# Patient Record
Sex: FEMALE | Race: WHITE | Employment: OTHER | URBAN - METROPOLITAN AREA
[De-identification: names, ages, dates, MRNs, and addresses within clinical notes are randomized per-mention and may not be internally consistent; named-entity substitution may affect disease eponyms.]

---

## 2017-04-18 ENCOUNTER — HOSPITAL ENCOUNTER (OUTPATIENT)
Dept: LAB | Age: 72
Discharge: HOME | End: 2017-04-18
Payer: MEDICARE

## 2017-04-18 ENCOUNTER — OFFICE VISIT (OUTPATIENT)
Dept: INTERNAL MEDICINE CLINIC | Age: 72
End: 2017-04-18

## 2017-04-18 VITALS
OXYGEN SATURATION: 98 % | RESPIRATION RATE: 16 BRPM | HEART RATE: 70 BPM | BODY MASS INDEX: 48.18 KG/M2 | WEIGHT: 245.4 LBS | HEIGHT: 60 IN | DIASTOLIC BLOOD PRESSURE: 76 MMHG | SYSTOLIC BLOOD PRESSURE: 126 MMHG | TEMPERATURE: 97.9 F

## 2017-04-18 DIAGNOSIS — Z12.31 ENCOUNTER FOR SCREENING MAMMOGRAM FOR BREAST CANCER: ICD-10-CM

## 2017-04-18 DIAGNOSIS — M79.7 FIBROMYALGIA: ICD-10-CM

## 2017-04-18 DIAGNOSIS — I10 ESSENTIAL HYPERTENSION: Primary | ICD-10-CM

## 2017-04-18 DIAGNOSIS — E55.9 VITAMIN D DEFICIENCY: ICD-10-CM

## 2017-04-18 PROBLEM — H57.02 ANISOCORIA: Status: ACTIVE | Noted: 2017-04-18

## 2017-04-18 PROCEDURE — 36415 COLL VENOUS BLD VENIPUNCTURE: CPT

## 2017-04-18 PROCEDURE — 80053 COMPREHEN METABOLIC PANEL: CPT

## 2017-04-18 PROCEDURE — 82306 VITAMIN D 25 HYDROXY: CPT

## 2017-04-18 RX ORDER — FUROSEMIDE 20 MG/1
TABLET ORAL DAILY
COMMUNITY
End: 2017-05-11 | Stop reason: SDUPTHER

## 2017-04-18 RX ORDER — IBUPROFEN 200 MG
200 TABLET ORAL AS NEEDED
COMMUNITY
End: 2018-05-17 | Stop reason: ALTCHOICE

## 2017-04-18 RX ORDER — CHOLECALCIFEROL (VITAMIN D3) 125 MCG
CAPSULE ORAL
COMMUNITY
End: 2018-05-17 | Stop reason: ALTCHOICE

## 2017-04-18 RX ORDER — ACETAMINOPHEN 325 MG/1
TABLET ORAL
COMMUNITY
End: 2021-05-25

## 2017-04-18 RX ORDER — TRAMADOL HYDROCHLORIDE 50 MG/1
50 TABLET ORAL
COMMUNITY
End: 2017-12-19 | Stop reason: SDUPTHER

## 2017-04-18 RX ORDER — LISINOPRIL 10 MG/1
TABLET ORAL DAILY
COMMUNITY
End: 2017-05-11

## 2017-04-18 RX ORDER — TRAMADOL HYDROCHLORIDE 50 MG/1
50 TABLET ORAL
Status: CANCELLED | OUTPATIENT
Start: 2017-04-18

## 2017-04-18 NOTE — PROGRESS NOTES
Patient received paperwork for advance directive during previous visit but has not completed at this time     Reviewed record In preparation for visit and have obtained necessary documentation      1. Have you been to the ER, urgent care clinic since your last visit? Hospitalized since your last visit? [facility] 1.5 mo ago r/t ear infection//amoxicillin     2. Have you seen or consulted any other health care providers outside of the [address] [place] [address] since your last visit? Include any pap smears or colon screening.  NO

## 2017-04-18 NOTE — PROGRESS NOTES
HPI  Ms. Keshia Salguero is a 73y.o. year old female, she is seen today to establish care. PMH significant for fibromyalgia, HTN. Takes tramadol for fibromyalgia pain, worse in summer. Has pain in multiple areas, sensitive to light touch. Says in summer will take lasix 40mg daily in summer with fluid retention, causes more pressure on bones and muscles and causes more pain. Has been working longer hours as a , worked 11 hours yesterday. Tried lyrica (felt dopey and forgetful), elavil (caused facial swelling) in past for fibromyalgia. Ruthann Hals will last one year with 15 pills. Says was seen for Dr. Sandra Rehman for low HR, has r/o YOLANDA. Stopped calan and HR improved, now normally in 60s. No cp or sob. No LOC or dizziness. Pain is worse when she has been out of work or sedentary. Has been seen by dermatologist and had multiple areas removed - has had skin CA - no melanoma.  x 2 since    [de-identified] has had high K in past, stopped eating so many high k foods and normalized. Chief Complaint   Patient presents with   [address]     Room // [facility] Dr Isma Bruce- prior PCP        Prior to Admission medications    Medication Sig Start Date End Date Taking? Authorizing Provider   lisinopril (PRINIVIL, ZESTRIL) 10 mg tablet Take  by mouth daily. Yes Historical Provider   furosemide (LASIX) 20 mg tablet Take  by mouth daily. Yes Historical Provider   traMADol (ULTRAM) 50 mg tablet Take 50 mg by mouth every six (6) hours as needed for Pain. Yes Historical Provider   NAPROXEN SODIUM (ALEVE PO) Take  by mouth as needed. Yes Historical Provider   ibuprofen (ADVIL) 200 mg tablet Take 200 mg by mouth as needed for Pain. Yes Historical Provider   cholecalciferol, vitamin D3, (VITAMIN D3) 2,000 unit tab Take  by mouth. Yes Historical Provider   acetaminophen (TYLENOL) 325 mg tablet Take  by mouth every four (4) hours as needed for Pain.    Yes Historical Provider   Cetirizine (ZYRTEC) 10 mg cap Take  by mouth. Yes Historical Provider         No Active Allergies      REVIEW OF SYSTEMS:  Per HPI    PHYSICAL EXAM:  Visit Vitals    /71 (BP 1 Location: Left arm, BP Patient Position: Sitting)    Pulse 70    Temp 97.9 °F (36.6 °C) (Oral)    Resp 16    Ht 5' (1.524 m)    Wt 245 lb 6.4 oz (111.3 kg)    SpO2 98%    BMI 47.93 kg/m2     Constitutional: Appears well-developed and well-nourished. No distress. HENT:   Head: Normocephalic and atraumatic. Eyes: No scleral icterus. Neck: no lad, no tm, supple   Cardiovascular: Normal S1/S2, regular rhythm. No murmurs, rubs, or gallops. Pulmonary/Chest: Effort normal and breath sounds normal. No respiratory distress. No wheezes, rhonchi, or rales. Abdomen: Soft, NT/ND, +BS, no rebound or guarding, no masses, no HSM appreciated. Ext: No edema. Neurological: Alert. Psychiatric: Normal mood and affect. Behavior is normal.     Lab Results   Component Value Date/Time    Sodium 141 05/16/2010 09:05 AM    Potassium 3.3 05/16/2010 09:05 AM    Chloride 104 05/16/2010 09:05 AM    CO2 30 05/16/2010 09:05 AM    Anion gap 7 05/16/2010 09:05 AM    Glucose 118 05/16/2010 09:05 AM    BUN 21 05/16/2010 09:05 AM    Creatinine 0.8 05/16/2010 09:05 AM    BUN/Creatinine ratio 26 05/16/2010 09:05 AM    GFR est AA >60 05/16/2010 09:05 AM    GFR est non-AA >60 05/16/2010 09:05 AM    Calcium 9.2 05/16/2010 09:05 AM    Bilirubin, total 0.3 05/16/2010 09:05 AM    AST (SGOT) 18 05/16/2010 09:05 AM    Alk.  phosphatase 147 05/16/2010 09:05 AM    Protein, total 7.5 05/16/2010 09:05 AM    Albumin 3.8 05/16/2010 09:05 AM    Globulin 3.7 05/16/2010 09:05 AM    A-G Ratio 1.0 05/16/2010 09:05 AM    ALT (SGPT) 40 05/16/2010 09:05 AM     No results found for: HBA1C, HGBE8, HNF3FHTE, TML6TYCA   No results found for: CHOL, CHOLPOCT, CHOLX, CHLST, CHOLV, HDL, HDLPOC, LDL, LDLCPOC, NLDLCT, DLDL, LDLC, DLDLP, VLDLC, VLDL, TGL, TGLX, TRIGL, [id number], [place] Jama, [address], [address]  [facility]       ASSESSMENT/PLAN  Bailey Dukes was seen today for establish care. Diagnoses and all orders for this visit:    Essential hypertension  -     METABOLIC PANEL, COMPREHENSIVE  ,Controlled on current regimen, continue   Fibromyalgia  Continue to stay active, stretch - rare use tramadol - doesn't need refill  Vitamin D deficiency  -     VITAMIN D, 25 HYDROXY    Encounter for screening mammogram for breast cancer  -     [facility] MAMMO BI SCREENING INCL CAD; Future    Will request records from previous provider        Health Maintenance Due   Topic Date Due    Hepatitis C Screening  1945    FOBT Q 1 YEAR AGE 50-75  10/01/1997    GLAUCOMA SCREENING Q2Y  10/01/2012    OSTEOPOROSIS SCREENING (DEXA)  10/01/2012    MEDICARE YEARLY EXAM  10/01/2012    BREAST CANCER SCRN MAMMOGRAM  01/08/2016        Follow-up Disposition:  Return in about 4 months (around 8/18/2017) for bp, fibromyalgia. Reviewed plan of care. Patient has provided input and agrees with goals. The nurse provided the patient and/or family with advanced directive information if needed and encouraged the patient to provide a copy to the office when available.

## 2017-04-18 NOTE — MR AVS SNAPSHOT
Visit Information Date & Time Provider Department Dept. Phone Encounter #  
 4/18/2017  3:15 PM Raúl Wood MD [facility] [phone] [id number] Follow-up Instructions Return in about 4 months (around 8/18/2017) for bp, fibromyalgia. Upcoming Health Maintenance Date Due Hepatitis C Screening 1945 FOBT Q 1 YEAR AGE 50-75 10/1/1997 GLAUCOMA SCREENING Q2Y 10/1/2012 OSTEOPOROSIS SCREENING (DEXA) 10/1/2012 MEDICARE YEARLY EXAM 10/1/2012 BREAST CANCER SCRN MAMMOGRAM 1/8/2016 Pneumococcal 65+ Low/Medium Risk (2 of 2 - PPSV23) 4/18/2018 DTaP/Tdap/Td series (2 - Td) 4/18/2027 Allergies as of 4/18/2017  Review Complete On: 4/18/2017 By: Raúl Wood MD  
 No Active Allergies Current Immunizations  Never Reviewed No immunizations on file. Not reviewed this visit You Were Diagnosed With   
  
 Codes Comments Essential hypertension    -  Primary ICD-10-CM: I10 
ICD-9-CM: 401.9 Fibromyalgia     ICD-10-CM: M79.7 ICD-9-CM: 729.1 Vitamin D deficiency     ICD-10-CM: E55.9 ICD-9-CM: 268.9 Encounter for screening mammogram for breast cancer     ICD-10-CM: Z12.31 
ICD-9-CM: V76.12 Vitals BP Pulse Temp Resp Height(growth percentile) Weight(growth percentile) 126/76 70 97.9 °F (36.6 °C) (Oral) 16 5' (1.524 m) 245 lb 6.4 oz (111.3 kg) SpO2 BMI Smoking Status 98% 47.93 kg/m2 Never Smoker Vitals History BMI and BSA Data Body Mass Index Body Surface Area  
 47.93 kg/m 2 2.17 m 2 Preferred Pharmacy Pharmacy Name Phone [facility] [address] [place], [place] - [address] [place] [address] [phone] Your Updated Medication List  
  
   
This list is accurate as of: 4/18/17  4:14 PM.  Always use your most recent med list. ADVIL 200 mg tablet Generic drug:  ibuprofen Take 200 mg by mouth as needed for Pain.   
  
 ALEVE PO  
 Take  by mouth as needed. LASIX 20 mg tablet Generic drug:  furosemide Take  by mouth daily. lisinopril 10 mg tablet Commonly known as:  Roxianne Daughters Take  by mouth daily. traMADol 50 mg tablet Commonly known as:  ULTRAM  
Take 50 mg by mouth every six (6) hours as needed for Pain. TYLENOL 325 mg tablet Generic drug:  acetaminophen Take  by mouth every four (4) hours as needed for Pain. VITAMIN D3 2,000 unit Tab Generic drug:  cholecalciferol (vitamin D3) Take  by mouth. ZyrTEC 10 mg Cap Generic drug:  Cetirizine Take  by mouth. We Performed the Following METABOLIC PANEL, COMPREHENSIVE [82707 CPT(R)] VITAMIN D, 25 HYDROXY [id number] CPT(R)] Follow-up Instructions Return in about 4 months (around 8/18/2017) for bp, fibromyalgia. To-Do List   
 04/18/2017 Imaging:  [facility] MAMMO BI SCREENING INCL CAD Patient Instructions Fibromyalgia: Care Instructions Your Care Instructions Fibromyalgia is a painful condition that is not completely understood by medical experts. The cause of fibromyalgia is not known. It can make you feel tired and ache all over. It causes tender spots at specific points of the body that hurt only when you press on them. You may have trouble sleeping, as well as other symptoms. These problems can upset your work and home life. Symptoms tend to come and go, although they may never go away completely. Fibromyalgia does not harm your muscles, joints, or organs. Follow-up care is a key part of your treatment and safety. Be sure to make and go to all appointments, and call your doctor if you are having problems. It's also a good idea to know your test results and keep a list of the medicines you take. How can you care for yourself at home? · Exercise often. Walk, swim, or bike to help with pain and sleep problems and to make you feel better. · Try to get a good night's sleep. Go to bed and get up at the same time each day, whether you feel rested or not. Make sure you have a good mattress and pillow. · Reduce stress. Avoid things that cause you stress, if you can. If not, work at making them less stressful. Learn to use biofeedback, guided imagery, meditation, or other methods to relax. · Make healthy changes. Eat a balanced diet, quit smoking, and limit alcohol and caffeine. · Use a heating pad set on low or take warm baths or showers for pain. Using cold packs for up to 20 minutes at a time can also relieve pain. Put a thin cloth between the cold pack and your skin. A gentle massage might help too. · Be safe with medicines. Take your medicines exactly as prescribed. Call your doctor if you think you are having a problem with your medicine. Your doctor may talk to you about taking antidepressant medicines. These medicines may improve sleep, relieve pain, and in some cases treat depression. · Learn about fibromyalgia. This makes coping easier. Then, take an active role in your treatment. · Think about joining a support group with others who have fibromyalgia to learn more and get support. When should you call for help? Watch closely for changes in your health, and be sure to contact your doctor if: 
· You feel sad, helpless, or hopeless; lose interest in things you used to enjoy; or have other symptoms of depression. · Your fibromyalgia symptoms get worse. Where can you learn more? Go to http://sarah-ingris.info/. Enter V003 in the search box to learn more about \"Fibromyalgia: Care Instructions. \" Current as of: October 14, 2016 Content Version: 11.2 © 1174-0891 Web and Rank. Care instructions adapted under license by [employer] (which disclaims liability or warranty for this information).  If you have questions about a medical condition or this instruction, always ask your healthcare professional. [address] any warranty or liability for your use of this information. Introducing [facility]! Dear Jonathan Rojas: 
Thank you for requesting a [employer] account. Our records indicate that you have previously registered for a [employer] account but its currently inactive. Please call our [employer] support line at [phone]. Additional Information If you have questions, please visit the Frequently Asked Questions section of the [employer] website at [email or web address] Remember, [employer] is NOT to be used for urgent needs. For medical emergencies, dial 911. Now available from your iPhone and Android! Please provide this summary of care documentation to your next provider. Your primary care clinician is listed as Star Roche. If you have any questions after today's visit, please call [phone].

## 2017-04-18 NOTE — PATIENT INSTRUCTIONS
Fibromyalgia: Care Instructions  Your Care Instructions  Fibromyalgia is a painful condition that is not completely understood by medical experts. The cause of fibromyalgia is not known. It can make you feel tired and ache all over. It causes tender spots at specific points of the body that hurt only when you press on them. You may have trouble sleeping, as well as other symptoms. These problems can upset your work and home life. Symptoms tend to come and go, although they may never go away completely. Fibromyalgia does not harm your muscles, joints, or organs. Follow-up care is a key part of your treatment and safety. Be sure to make and go to all appointments, and call your doctor if you are having problems. It's also a good idea to know your test results and keep a list of the medicines you take. How can you care for yourself at home? · Exercise often. Walk, swim, or bike to help with pain and sleep problems and to make you feel better. · Try to get a good night's sleep. Go to bed and get up at the same time each day, whether you feel rested or not. Make sure you have a good mattress and pillow. · Reduce stress. Avoid things that cause you stress, if you can. If not, work at making them less stressful. Learn to use biofeedback, guided imagery, meditation, or other methods to relax. · Make healthy changes. Eat a balanced diet, quit smoking, and limit alcohol and caffeine. · Use a heating pad set on low or take warm baths or showers for pain. Using cold packs for up to 20 minutes at a time can also relieve pain. Put a thin cloth between the cold pack and your skin. A gentle massage might help too. · Be safe with medicines. Take your medicines exactly as prescribed. Call your doctor if you think you are having a problem with your medicine. Your doctor may talk to you about taking antidepressant medicines. These medicines may improve sleep, relieve pain, and in some cases treat depression.   · Learn about fibromyalgia. This makes coping easier. Then, take an active role in your treatment. · Think about joining a support group with others who have fibromyalgia to learn more and get support. When should you call for help? Watch closely for changes in your health, and be sure to contact your doctor if:  · You feel sad, helpless, or hopeless; lose interest in things you used to enjoy; or have other symptoms of depression. · Your fibromyalgia symptoms get worse. Where can you learn more? Go to http://sarah-ingris.info/. Enter V003 in the search box to learn more about \"Fibromyalgia: Care Instructions. \"  Current as of: October 14, 2016  Content Version: 11.2  © 3485-6763 [employer]. Care instructions adapted under license by [employer] (which disclaims liability or warranty for this information). If you have questions about a medical condition or this instruction, always ask your healthcare professional. [address] any warranty or liability for your use of this information.

## 2017-04-19 LAB
25(OH)D3+25(OH)D2 SERPL-MCNC: 35.2 NG/ML (ref 30–100)
ALBUMIN SERPL-MCNC: 4.5 G/DL (ref 3.6–4.8)
ALBUMIN/GLOB SERPL: 2 {RATIO} (ref 1.2–2.2)
ALP SERPL-CCNC: 98 IU/L (ref 39–117)
ALT SERPL-CCNC: 15 IU/L (ref 0–32)
AST SERPL-CCNC: 18 IU/L (ref 0–40)
BILIRUB SERPL-MCNC: 0.2 MG/DL (ref 0–1.2)
BUN SERPL-MCNC: 37 MG/DL (ref 8–27)
BUN/CREAT SERPL: 30 (ref 12–28)
CALCIUM SERPL-MCNC: 9.5 MG/DL (ref 8.7–10.3)
CHLORIDE SERPL-SCNC: 100 MMOL/L (ref 96–106)
CO2 SERPL-SCNC: 22 MMOL/L (ref 18–29)
CREAT SERPL-MCNC: 1.24 MG/DL (ref 0.57–1)
GLOBULIN SER CALC-MCNC: 2.2 G/DL (ref 1.5–4.5)
GLUCOSE SERPL-MCNC: 95 MG/DL (ref 65–99)
INTERPRETATION: NORMAL
POTASSIUM SERPL-SCNC: 5.3 MMOL/L (ref 3.5–5.2)
PROT SERPL-MCNC: 6.7 G/DL (ref 6–8.5)
SODIUM SERPL-SCNC: 138 MMOL/L (ref 134–144)

## 2017-04-21 RX ORDER — FUROSEMIDE 20 MG/1
TABLET ORAL DAILY
Status: CANCELLED | OUTPATIENT
Start: 2017-04-21

## 2017-04-25 RX ORDER — FUROSEMIDE 40 MG/1
20 TABLET ORAL
Qty: 30 TAB | Refills: 0 | Status: SHIPPED | OUTPATIENT
Start: 2017-04-25 | End: 2017-08-28 | Stop reason: SDUPTHER

## 2017-04-25 NOTE — PROGRESS NOTES
Tell her vit d okay. Appears kidneys are strained - possibly from lisinopril. I want her to stop it and follow up in 1-2 weeks for repeat bmp and bp check.

## 2017-05-11 ENCOUNTER — OFFICE VISIT (OUTPATIENT)
Dept: INTERNAL MEDICINE CLINIC | Age: 72
End: 2017-05-11

## 2017-05-11 VITALS
HEIGHT: 60 IN | RESPIRATION RATE: 16 BRPM | HEART RATE: 55 BPM | DIASTOLIC BLOOD PRESSURE: 82 MMHG | BODY MASS INDEX: 47.57 KG/M2 | WEIGHT: 242.3 LBS | TEMPERATURE: 98.2 F | SYSTOLIC BLOOD PRESSURE: 150 MMHG | OXYGEN SATURATION: 96 %

## 2017-05-11 DIAGNOSIS — Z00.00 ROUTINE GENERAL MEDICAL EXAMINATION AT A HEALTH CARE FACILITY: ICD-10-CM

## 2017-05-11 DIAGNOSIS — Z13.820 ENCOUNTER FOR SCREENING FOR OSTEOPOROSIS: ICD-10-CM

## 2017-05-11 DIAGNOSIS — Z71.89 ADVANCE DIRECTIVE DISCUSSED WITH PATIENT: ICD-10-CM

## 2017-05-11 DIAGNOSIS — R79.89 ELEVATED SERUM CREATININE: ICD-10-CM

## 2017-05-11 DIAGNOSIS — Z13.39 SCREENING FOR ALCOHOLISM: ICD-10-CM

## 2017-05-11 DIAGNOSIS — E87.5 HYPERKALEMIA: ICD-10-CM

## 2017-05-11 DIAGNOSIS — I10 ESSENTIAL HYPERTENSION: Primary | ICD-10-CM

## 2017-05-11 DIAGNOSIS — Z78.0 ASYMPTOMATIC MENOPAUSAL STATE: ICD-10-CM

## 2017-05-11 DIAGNOSIS — Z11.59 ENCOUNTER FOR HEPATITIS C SCREENING TEST FOR LOW RISK PATIENT: ICD-10-CM

## 2017-05-11 NOTE — MR AVS SNAPSHOT
Visit Information Date & Time Provider Department Dept. Phone Encounter #  
 5/11/2017 12:45 PM MD Andre Reveles [phone] [id number] Follow-up Instructions Return in about 3 months (around 8/11/2017) for bp. Your Appointments 8/24/2017  1:00 PM  
ROUTINE CARE with MD Andre Reveles [facility]) Appt Note: 4mth f/u; bp, fibromyalgia [address] [facility] [address] [phone]  
  
   
 [facility] [address] Upcoming Health Maintenance Date Due Hepatitis C Screening 1945 FOBT Q 1 YEAR AGE 50-75 10/1/1997 GLAUCOMA SCREENING Q2Y 10/1/2012 OSTEOPOROSIS SCREENING (DEXA) 10/1/2012 MEDICARE YEARLY EXAM 10/1/2012 INFLUENZA AGE 9 TO ADULT 8/1/2017 Pneumococcal 65+ Low/Medium Risk (2 of 2 - PPSV23) 4/18/2018 BREAST CANCER SCRN MAMMOGRAM 5/11/2019 DTaP/Tdap/Td series (2 - Td) 4/18/2027 Allergies as of 5/11/2017  Review Complete On: 5/11/2017 By: Leanne Capps MD  
 No Known Allergies Current Immunizations  Never Reviewed No immunizations on file. Not reviewed this visit You Were Diagnosed With   
  
 Codes Comments Advance directive discussed with patient    -  Primary ICD-10-CM: Z71.89 ICD-9-CM: V65.49 Essential hypertension     ICD-10-CM: I10 
ICD-9-CM: 401.9 Elevated serum creatinine     ICD-10-CM: R79.89 ICD-9-CM: 790.99 Hyperkalemia     ICD-10-CM: E87.5 ICD-9-CM: 276.7 Encounter for screening for osteoporosis     ICD-10-CM: Z13.820 ICD-9-CM: V82.81 Encounter for hepatitis C screening test for low risk patient     ICD-10-CM: Z11.59 
ICD-9-CM: V73.89 Asymptomatic menopausal state     ICD-10-CM: Z78.0 ICD-9-CM: V49.81 Routine general medical examination at a health care facility     ICD-10-CM: Z00.00 ICD-9-CM: V70.0 Screening for alcoholism     ICD-10-CM: Z13.89 ICD-9-CM: V79.1 Vitals BP Pulse Temp Resp Height(growth percentile) Weight(growth percentile) 150/82 (!) 55 98.2 °F (36.8 °C) (Oral) 16 5' (1.524 m) 242 lb 4.8 oz (109.9 kg) SpO2 BMI OB Status Smoking Status 96% 47.32 kg/m2 Postmenopausal Never Smoker Vitals History BMI and BSA Data Body Mass Index Body Surface Area  
 47.32 kg/m 2 2.16 m 2 Preferred Pharmacy Pharmacy Name Phone [facility] [address] [place], [place] - [address] [place] [address] [phone] Your Updated Medication List  
  
   
This list is accurate as of: 5/11/17  1:13 PM.  Always use your most recent med list. ADVIL 200 mg tablet Generic drug:  ibuprofen Take 200 mg by mouth as needed for Pain. ALEVE PO Take  by mouth as needed. furosemide 40 mg tablet Commonly known as:  LASIX Take 0.5 Tabs by mouth daily as needed. Indications: Edema  
  
 traMADol 50 mg tablet Commonly known as:  ULTRAM  
Take 50 mg by mouth every six (6) hours as needed for Pain. TYLENOL 325 mg tablet Generic drug:  acetaminophen Take  by mouth every four (4) hours as needed for Pain. VITAMIN D3 2,000 unit Tab Generic drug:  cholecalciferol (vitamin D3) Take  by mouth. ZyrTEC 10 mg Cap Generic drug:  Cetirizine Take  by mouth. We Performed the Following HEPATITIS C AB [76195 CPT(R)] LIPID PANEL [41917 CPT(R)] METABOLIC PANEL, BASIC [63262 CPT(R)] Follow-up Instructions Return in about 3 months (around 8/11/2017) for bp. To-Do List   
 05/11/2017 Imaging:  DEXA BONE DENSITY STUDY AXIAL Patient Instructions I will get the labs from Dr. Selma Bhakta before deciding which BP medication would be best for you. You will be contacted within the next week. Medicare Wellness Visit, Female The best way to live healthy is to have a healthy lifestyle by eating a well-balanced diet, exercising regularly, limiting alcohol and stopping smoking. Regular physical exams and screening tests are another way to keep healthy. Preventive exams provided by your health care provider can find health problems before they become diseases or illnesses. Preventive services including immunizations, screening tests, monitoring and exams can help you take care of your own health. All people over age 72 should have a pneumovax  and and a prevnar shot to prevent pneumonia. These are once in a lifetime unless you and your provider decide differently. All people over 65 should have a yearly flu shot and a tetanus vaccine every 10 years. A bone mass density to screen for osteoporosis or thinning of the bones should be done every 2 years after 65. Screening for diabetes mellitus with a blood sugar test should be done every year. Glaucoma is a disease of the eye due to increased ocular pressure that can lead to blindness and it should be done every year by an eye professional. 
 
Cardiovascular screening tests that check for elevated lipids (fatty part of blood) which can lead to heart disease and strokes should be done every 5 years. Colorectal screening that evaluates for blood or polyps in your colon should be done yearly as a stool test or every five years as a flexible sigmoidoscope or every 10 years as a colonoscopy up to age 76. Breast cancer screening with a mammogram is recommended biennially  for women age 54-69. Screening for cervical cancer with a pap smear and pelvic exam is recommended for women after age 72 years every 2 years up to age 79 or when the provider and patient decide to stop. If there is a history of cervical abnormalities or other increased risk for cancer then the test is recommended yearly.  
 
Hepatitis C screening is also recommended for anyone born between [birth year] through 1965. A shingles vaccine is also recommended once in a lifetime after age 61. Your Medicare Wellness Exam is recommended annually. Here is a list of your current Health Maintenance items with a due date: 
Health Maintenance Due Topic Date Due  
 Hepatitis C Test  1945  Stool testing for trace blood  10/01/1997  Glaucoma Screening   10/01/2012  Bone Density Screening  10/01/2012 Josequeta Hammondcarin Annual Well Visit  10/01/2012 Introducing [facility]! Dear Gail Padilla: 
Thank you for requesting a [employer] account. Our records indicate that you have previously registered for a [employer] account but its currently inactive. Please call our [employer] support line at [phone]. Additional Information If you have questions, please visit the Frequently Asked Questions section of the [employer] website at [email or web address] Remember, [employer] is NOT to be used for urgent needs. For medical emergencies, dial 911. Now available from your iPhone and Android! Please provide this summary of care documentation to your next provider. Your primary care clinician is listed as Star Roche. If you have any questions after today's visit, please call [phone].

## 2017-05-11 NOTE — PROGRESS NOTES
HPI  Ms. Mahendra Barron is a 73y.o. year old female, she is seen today for follow up HTN after stopping lisinopril. Stopped eating bananas and cantaloupe after last visit, eating other fruits instead. No longer getting unusual cramps in legs. Takes furosemide 20mg daily, edema controlled. No cp or sob. Not checking bp. Takes 2 tylenol arthritis strength daily in morning , 2 aleve in past 2 weeks. Will see Dr. Florence Owusu - nephrologist - in June. Has seen him once last summer. No change in bowels, melena or brbpr. Will also see eye doctor in June. Chief Complaint   Patient presents with    Blood Pressure Check     Room 1// fasting    Medication Evaluation     d/c'ed lisinopril        Prior to Admission medications    Medication Sig Start Date End Date Taking? Authorizing Provider   furosemide (LASIX) 40 mg tablet Take 0.5 Tabs by mouth daily as needed. Indications: Edema 4/25/17  Yes Bulmaro Ko MD   traMADol Genna [place]) 50 mg tablet Take 50 mg by mouth every six (6) hours as needed for Pain. Yes Historical Provider   NAPROXEN SODIUM (ALEVE PO) Take  by mouth as needed. Yes Historical Provider   ibuprofen (ADVIL) 200 mg tablet Take 200 mg by mouth as needed for Pain. Yes Historical Provider   cholecalciferol, vitamin D3, (VITAMIN D3) 2,000 unit tab Take  by mouth. Yes Historical Provider   acetaminophen (TYLENOL) 325 mg tablet Take  by mouth every four (4) hours as needed for Pain. Yes Historical Provider   Cetirizine (ZYRTEC) 10 mg cap Take  by mouth. Yes Historical Provider   lisinopril (PRINIVIL, ZESTRIL) 10 mg tablet Take  by mouth daily. Historical Provider         No Known Allergies      REVIEW OF SYSTEMS:  Per HPI    PHYSICAL EXAM:  Visit Vitals    /82    Pulse (!) 55    Temp 98.2 °F (36.8 °C) (Oral)    Resp 16    Ht 5' (1.524 m)    Wt 242 lb 4.8 oz (109.9 kg)    SpO2 96%    BMI 47.32 kg/m2     Constitutional: Appears well-developed and well-nourished.  No distress. HENT:   Head: Normocephalic and atraumatic. Eyes: No scleral icterus. Cardiovascular: Normal S1/S2, regular rhythm. No murmurs, rubs, or gallops. Pulmonary/Chest: Effort normal and breath sounds normal. No respiratory distress. No wheezes, rhonchi, or rales. Ext: No edema. Neurological: Alert. Psychiatric: Normal mood and affect. Behavior is normal.     Lab Results   Component Value Date/Time    Sodium 138 04/18/2017 04:24 PM    Potassium 5.3 04/18/2017 04:24 PM    Chloride 100 04/18/2017 04:24 PM    CO2 22 04/18/2017 04:24 PM    Anion gap 7 05/16/2010 09:05 AM    Glucose 95 04/18/2017 04:24 PM    BUN 37 04/18/2017 04:24 PM    Creatinine 1.24 04/18/2017 04:24 PM    BUN/Creatinine ratio 30 04/18/2017 04:24 PM    GFR est AA 51 04/18/2017 04:24 PM    GFR est non-AA 44 04/18/2017 04:24 PM    Calcium 9.5 04/18/2017 04:24 PM    Bilirubin, total 0.2 04/18/2017 04:24 PM    AST (SGOT) 18 04/18/2017 04:24 PM    Alk. phosphatase 98 04/18/2017 04:24 PM    Protein, total 6.7 04/18/2017 04:24 PM    Albumin 4.5 04/18/2017 04:24 PM    Globulin 3.7 05/16/2010 09:05 AM    A-G Ratio 2.0 04/18/2017 04:24 PM    ALT (SGPT) 15 04/18/2017 04:24 PM     No results found for: HBA1C, HGBE8, [id number]   No results found for: CHOL, CHOLPOCT, CHOLX, CHLST, CHOLV, HDL, HDLPOC, LDL, LDLCPOC, NLDLCT, DLDL, LDLC, DLDLP, VLDLC, VLDL, TGL, TGLX, TRIGL, [id number], TRIGP, TGLPOCT, CHHD, CHHDX       ASSESSMENT/PLAN  Clarence Zhang was seen today for blood pressure check and medication evaluation.     Diagnoses and all orders for this visit:    Essential hypertension  -     METABOLIC PANEL, BASIC  -     LIPID PANEL  Check bmp today, bp high and will need antihypertensive, results of labs will determine which agent is appropriate - will also request records from Dr. Sam Bruce to get baseline creatinine    Advance directive discussed with patient    Elevated serum creatinine    Hyperkalemia  Plan above  Encounter for screening for osteoporosis  - DEXA BONE DENSITY STUDY AXIAL; Future    Encounter for hepatitis C screening test for low risk patient  -     HEPATITIS C AB    Asymptomatic menopausal state   -     DEXA BONE DENSITY STUDY AXIAL; Future    Routine general medical examination at a health care facility    Screening for alcoholism          Health Maintenance Due   Topic Date Due    Hepatitis C Screening  1945    GLAUCOMA SCREENING Q2Y  10/01/2012    OSTEOPOROSIS SCREENING (DEXA)  10/01/2012        Follow-up Disposition:  Return in about 3 months (around 8/11/2017) for bp. Reviewed plan of care. Patient has provided input and agrees with goals. The nurse provided the patient and/or family with advanced directive information if needed and encouraged the patient to provide a copy to the office when available. This is a Subsequent Medicare Annual Wellness Visit providing Personalized Prevention Plan Services (PPPS) (Performed 12 months after initial AWV and PPPS )    I have reviewed the patient's medical history in detail and updated the computerized patient record. History     Past Medical History:   Diagnosis Date    Hypertension       No past surgical history on file. Current Outpatient Prescriptions   Medication Sig Dispense Refill    furosemide (LASIX) 40 mg tablet Take 0.5 Tabs by mouth daily as needed. Indications: Edema 30 Tab 0    traMADol (ULTRAM) 50 mg tablet Take 50 mg by mouth every six (6) hours as needed for Pain.  NAPROXEN SODIUM (ALEVE PO) Take  by mouth as needed.  ibuprofen (ADVIL) 200 mg tablet Take 200 mg by mouth as needed for Pain.  cholecalciferol, vitamin D3, (VITAMIN D3) 2,000 unit tab Take  by mouth.  acetaminophen (TYLENOL) 325 mg tablet Take  by mouth every four (4) hours as needed for Pain.  Cetirizine (ZYRTEC) 10 mg cap Take  by mouth.  lisinopril (PRINIVIL, ZESTRIL) 10 mg tablet Take  by mouth daily.        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 Alcohol Risk Factor Screening: On any occasion during the past 3 months, have you had more than 3 drinks containing alcohol? No    Do you average more than 7 drinks per week? No      Functional Ability and Level of Safety:     Hearing Loss   none    Activities of Daily Living   Self-care. Requires assistance with: no ADLs    Fall Risk   No flowsheet data found. Abuse Screen   Patient is not abused    Review of Systems   Pertinent items are noted in HPI. Physical Examination     Evaluation of Cognitive Function:  Mood/affect:  happy  Appearance: age appropriate  Family member/caregiver input: n/a        Patient Care Team:  Jayne Hernández MD as PCP - General (Internal Medicine)    Advice/Referrals/Counseling   Education and counseling provided:  Are appropriate based on today's review and evaluation      Assessment/Plan   Hernan Oakes was seen today for blood pressure check and medication evaluation. Diagnoses and all orders for this visit:    Advance directive discussed with patient    Essential hypertension  -     METABOLIC PANEL, BASIC  -     LIPID PANEL    Elevated serum creatinine    Hyperkalemia    Encounter for screening for osteoporosis  -     DEXA BONE DENSITY STUDY AXIAL; Future    Encounter for hepatitis C screening test for low risk patient  -     HEPATITIS C AB    Asymptomatic menopausal state   -     DEXA BONE DENSITY STUDY AXIAL;  Future    Routine general medical examination at a health care facility    Screening for alcoholism      Follow-up Disposition:  Return in about 3 months (around 8/11/2017) for bp. Karmen Marques

## 2017-05-11 NOTE — LETTER
6/22/2017 11:20 AM 
 
Ms. Ernandez Rule 59 [address] [address] [place], your Provider has recommended a Dexa Scan for you. Our records indicate you are overdue or your annual exam is coming up and this is a reminder. If you have already completed your Dexa Scan outside of [facility], please inform us so we can update your chart. Please call [phone] or your screening center to schedule an appointment at your convenience. Thank you for allowing us to assist you in your health care and being part of [facility]! Sincerely, Jayne Hernández MD

## 2017-05-11 NOTE — PROGRESS NOTES
Patient received paperwork for advance directive during previous visit but has not completed at this time     Reviewed record In preparation for visit and have obtained necessary documentation      1. Have you been to the ER, urgent care clinic since your last visit? Hospitalized since your last visit? NO    2. Have you seen or consulted any other health care providers outside of the [address] [place] [address] since your last visit? Include any pap smears or colon screening.  NO

## 2017-05-11 NOTE — PATIENT INSTRUCTIONS
I will get the labs from Dr. Leonardo Arvizu before deciding which BP medication would be best for you. You will be contacted within the next week. Medicare Wellness Visit, Female    The best way to live healthy is to have a healthy lifestyle by eating a well-balanced diet, exercising regularly, limiting alcohol and stopping smoking. Regular physical exams and screening tests are another way to keep healthy. Preventive exams provided by your health care provider can find health problems before they become diseases or illnesses. Preventive services including immunizations, screening tests, monitoring and exams can help you take care of your own health. All people over age 72 should have a pneumovax  and and a prevnar shot to prevent pneumonia. These are once in a lifetime unless you and your provider decide differently. All people over 65 should have a yearly flu shot and a tetanus vaccine every 10 years. A bone mass density to screen for osteoporosis or thinning of the bones should be done every 2 years after 65. Screening for diabetes mellitus with a blood sugar test should be done every year. Glaucoma is a disease of the eye due to increased ocular pressure that can lead to blindness and it should be done every year by an eye professional.    Cardiovascular screening tests that check for elevated lipids (fatty part of blood) which can lead to heart disease and strokes should be done every 5 years. Colorectal screening that evaluates for blood or polyps in your colon should be done yearly as a stool test or every five years as a flexible sigmoidoscope or every 10 years as a colonoscopy up to age 76. Breast cancer screening with a mammogram is recommended biennially  for women age 54-69. Screening for cervical cancer with a pap smear and pelvic exam is recommended for women after age 72 years every 2 years up to age 79 or when the provider and patient decide to stop. If there is a history of cervical abnormalities or other increased risk for cancer then the test is recommended yearly. Hepatitis C screening is also recommended for anyone born between [birth year] through [address]. A shingles vaccine is also recommended once in a lifetime after age 61. Your Medicare Wellness Exam is recommended annually.     Here is a list of your current Health Maintenance items with a due date:  Health Maintenance Due   Topic Date Due    Hepatitis C Test  1945    Stool testing for trace blood  10/01/1997    Glaucoma Screening   10/01/2012    Bone Density Screening  10/01/2012    Annual Well Visit  10/01/2012

## 2017-05-18 ENCOUNTER — APPOINTMENT (OUTPATIENT)
Dept: INTERNAL MEDICINE CLINIC | Age: 72
End: 2017-05-18

## 2017-05-18 ENCOUNTER — HOSPITAL ENCOUNTER (OUTPATIENT)
Dept: LAB | Age: 72
Discharge: HOME | End: 2017-05-18
Payer: MEDICARE

## 2017-05-18 PROCEDURE — 80061 LIPID PANEL: CPT

## 2017-05-18 PROCEDURE — 36415 COLL VENOUS BLD VENIPUNCTURE: CPT

## 2017-05-18 PROCEDURE — 86803 HEPATITIS C AB TEST: CPT

## 2017-05-18 PROCEDURE — 80048 BASIC METABOLIC PNL TOTAL CA: CPT

## 2017-05-19 LAB
BUN SERPL-MCNC: 24 MG/DL (ref 8–27)
BUN/CREAT SERPL: 23 (ref 12–28)
CALCIUM SERPL-MCNC: 9.4 MG/DL (ref 8.7–10.3)
CHLORIDE SERPL-SCNC: 106 MMOL/L (ref 96–106)
CHOLEST SERPL-MCNC: 161 MG/DL (ref 100–199)
CO2 SERPL-SCNC: 24 MMOL/L (ref 18–29)
CREAT SERPL-MCNC: 1.04 MG/DL (ref 0.57–1)
GLUCOSE SERPL-MCNC: 98 MG/DL (ref 65–99)
HCV AB S/CO SERPL IA: 0.1 S/CO RATIO (ref 0–0.9)
HDLC SERPL-MCNC: 51 MG/DL
INTERPRETATION, 910389: NORMAL
INTERPRETATION: NORMAL
LDLC SERPL CALC-MCNC: 90 MG/DL (ref 0–99)
PDF IMAGE, 910387: NORMAL
POTASSIUM SERPL-SCNC: 4.8 MMOL/L (ref 3.5–5.2)
SODIUM SERPL-SCNC: 144 MMOL/L (ref 134–144)
TRIGL SERPL-MCNC: 100 MG/DL (ref 0–149)
VLDLC SERPL CALC-MCNC: 20 MG/DL (ref 5–40)

## 2017-05-22 RX ORDER — AMLODIPINE BESYLATE 5 MG/1
5 TABLET ORAL DAILY
Qty: 30 TAB | Refills: 5 | Status: SHIPPED | OUTPATIENT
Start: 2017-05-22 | End: 2017-12-19

## 2017-05-22 NOTE — PROGRESS NOTES
Tell her no hep C. Creatinine is improved. Potassium normal.  Cholesterol excellent. Start amlodipine 5mg daily instead for HTN. Will not affect kidneys.

## 2017-05-28 ENCOUNTER — TELEPHONE (OUTPATIENT)
Dept: INTERNAL MEDICINE CLINIC | Age: 72
End: 2017-05-28

## 2017-05-29 NOTE — TELEPHONE ENCOUNTER
On Call Phone Message: Received call from patient on 5/28/17 at 5:16 pm. She complained of feeling short of breath and dizzy after starting amlodipine 5 mg daily for BP; Had ankle swelling this morning. Is also on Lasix but has been on this much longer. SOB and dizziness bothered her at work as a . Wants to know if it is okay to stop amlodipine and resume taking lisinopril that she was taking before but was stopped due to kidney problems. I advised that she stop amlodipine and do NOT restart lisinopril. Continue on Lasix for BP. Call clinic on 5/30/17 and ask for an appointment with Dr. Astrid Vaughn.

## 2017-05-31 ENCOUNTER — TELEPHONE (OUTPATIENT)
Dept: INTERNAL MEDICINE CLINIC | Age: 72
End: 2017-05-31

## 2017-05-31 RX ORDER — LOSARTAN POTASSIUM 25 MG/1
25 TABLET ORAL DAILY
Qty: 30 TAB | Refills: 2 | Status: SHIPPED | OUTPATIENT
Start: 2017-05-31 | End: 2017-12-19

## 2017-05-31 NOTE — TELEPHONE ENCOUNTER
Per pt, she is not taking any BP meds atthis time. Is taking Lasix 20 mg daily. Reports she does not want to come in for an appt at this time. States the rash and swelling and SOB is better since stopping amlodipine. Would like to know if she should re-start Lisinopril.

## 2017-06-21 NOTE — TELEPHONE ENCOUNTER
Pt is calling in to request a limited script for Lyrica and Lunesta r/t to fibromyalgia pain. States she takes these 2 medications effectively PRN for the pain which is worse in the summer. States if she can just get approx 15 pills of each that will last her for a long time.

## 2017-06-23 RX ORDER — ESZOPICLONE 1 MG/1
1 TABLET, FILM COATED ORAL
Qty: 20 TAB | Refills: 0 | Status: SHIPPED | OUTPATIENT
Start: 2017-06-23 | End: 2018-05-17 | Stop reason: ALTCHOICE

## 2017-06-23 RX ORDER — FLUOXETINE 10 MG/1
10 TABLET ORAL DAILY
Qty: 30 TAB | Refills: 1 | Status: SHIPPED | OUTPATIENT
Start: 2017-06-23 | End: 2017-12-19

## 2017-06-23 RX ORDER — PREGABALIN 75 MG/1
75 CAPSULE ORAL DAILY
Qty: 30 CAP | Refills: 0 | Status: SHIPPED | OUTPATIENT
Start: 2017-06-23 | End: 2017-12-19

## 2017-06-23 RX ORDER — FLUOXETINE HYDROCHLORIDE 10 MG/1
10 TABLET ORAL DAILY
Status: CANCELLED | OUTPATIENT
Start: 2017-06-23

## 2017-06-23 NOTE — TELEPHONE ENCOUNTER
Pt states she was on 75 mg Lyrica and is not sure of Lunesta dose, will trust  [facility] to decide. Pt also states she was on Sarafem in her 50's to help with mood swings and would like to get back on that.

## 2017-08-28 RX ORDER — FUROSEMIDE 40 MG/1
TABLET ORAL
Qty: 15 TAB | Refills: 1 | Status: SHIPPED | OUTPATIENT
Start: 2017-08-28 | End: 2017-10-30 | Stop reason: SDUPTHER

## 2017-10-30 RX ORDER — FUROSEMIDE 40 MG/1
TABLET ORAL
Qty: 15 TAB | Refills: 1 | Status: SHIPPED | OUTPATIENT
Start: 2017-10-30 | End: 2017-12-27 | Stop reason: SDUPTHER

## 2017-12-19 ENCOUNTER — OFFICE VISIT (OUTPATIENT)
Dept: INTERNAL MEDICINE CLINIC | Age: 72
End: 2017-12-19

## 2017-12-19 ENCOUNTER — HOSPITAL ENCOUNTER (OUTPATIENT)
Dept: LAB | Age: 72
Discharge: HOME | End: 2017-12-19
Payer: MEDICARE

## 2017-12-19 VITALS
TEMPERATURE: 97.7 F | HEART RATE: 75 BPM | OXYGEN SATURATION: 97 % | DIASTOLIC BLOOD PRESSURE: 74 MMHG | RESPIRATION RATE: 14 BRPM | WEIGHT: 234.4 LBS | SYSTOLIC BLOOD PRESSURE: 130 MMHG | BODY MASS INDEX: 46.02 KG/M2 | HEIGHT: 60 IN

## 2017-12-19 DIAGNOSIS — I10 ESSENTIAL HYPERTENSION: Primary | ICD-10-CM

## 2017-12-19 DIAGNOSIS — M79.7 FIBROMYALGIA: ICD-10-CM

## 2017-12-19 DIAGNOSIS — J32.9 CHRONIC SINUSITIS, UNSPECIFIED LOCATION: ICD-10-CM

## 2017-12-19 DIAGNOSIS — G44.229 CHRONIC TENSION-TYPE HEADACHE, NOT INTRACTABLE: ICD-10-CM

## 2017-12-19 DIAGNOSIS — M54.2 NECK PAIN: ICD-10-CM

## 2017-12-19 DIAGNOSIS — E66.01 OBESITY, MORBID (HCC): ICD-10-CM

## 2017-12-19 PROCEDURE — 36415 COLL VENOUS BLD VENIPUNCTURE: CPT

## 2017-12-19 PROCEDURE — 80048 BASIC METABOLIC PNL TOTAL CA: CPT

## 2017-12-19 RX ORDER — FLUTICASONE PROPIONATE 50 MCG
2 SPRAY, SUSPENSION (ML) NASAL DAILY
Qty: 1 BOTTLE | Refills: 2 | Status: SHIPPED | OUTPATIENT
Start: 2017-12-19 | End: 2018-05-17 | Stop reason: ALTCHOICE

## 2017-12-19 RX ORDER — LISINOPRIL 10 MG/1
10 TABLET ORAL DAILY
COMMUNITY
Start: 2017-11-28 | End: 2019-07-25 | Stop reason: SDUPTHER

## 2017-12-19 RX ORDER — TRAMADOL HYDROCHLORIDE 50 MG/1
50 TABLET ORAL
Qty: 30 TAB | Refills: 0 | Status: SHIPPED | OUTPATIENT
Start: 2017-12-19 | End: 2018-07-03 | Stop reason: SDUPTHER

## 2017-12-19 NOTE — MR AVS SNAPSHOT
Visit Information Date & Time Provider Department Dept. Phone Encounter #  
 12/19/2017  3:15 PM James Reeves MD [facility] [phone] [id number] Follow-up Instructions Return in about 6 months (around 6/19/2018) for bp. Upcoming Health Maintenance Date Due  
 GLAUCOMA SCREENING Q2Y 10/1/2012 Pneumococcal 65+ Low/Medium Risk (2 of 2 - PPSV23) 4/18/2018 MEDICARE YEARLY EXAM 5/12/2018 DTaP/Tdap/Td series (2 - Td) 4/18/2027 Allergies as of 12/19/2017  Review Complete On: 12/19/2017 By: James Reeves MD  
 No Known Allergies Current Immunizations  Never Reviewed No immunizations on file. Not reviewed this visit You Were Diagnosed With   
  
 Codes Comments Essential hypertension    -  Primary ICD-10-CM: I10 
ICD-9-CM: 401.9 Obesity, morbid ([facility] [address]     ICD-10-CM: E66.01 
ICD-9-CM: 278.01 Fibromyalgia     ICD-10-CM: M79.7 ICD-9-CM: 729.1 Chronic sinusitis, unspecified location     ICD-10-CM: J32.9 ICD-9-CM: 473.9 Chronic tension-type headache, not intractable     ICD-10-CM: Z81.735 ICD-9-CM: 339.12 Neck pain     ICD-10-CM: M54.2 ICD-9-CM: 723.1 Vitals BP Pulse Temp Resp Height(growth percentile) Weight(growth percentile) 130/74 75 97.7 °F (36.5 °C) (Oral) 14 5' (1.524 m) 234 lb 6.4 oz (106.3 kg) SpO2 BMI OB Status Smoking Status 97% 45.78 kg/m2 Postmenopausal Never Smoker Vitals History BMI and BSA Data Body Mass Index Body Surface Area 45.78 kg/m 2 2.12 m 2 Preferred Pharmacy Pharmacy Name Phone [facility] [address] [place], [place] - [address] [place] [address] Chandler Rosy [phone] Your Updated Medication List  
  
   
This list is accurate as of: 12/19/17  3:52 PM.  Always use your most recent med list. ADVIL 200 mg tablet Generic drug:  ibuprofen Take 200 mg by mouth as needed for Pain. ALEVE PO Take  by mouth as needed. eszopiclone 1 mg tablet Commonly known as:  Mineral Camel Take 1 Tab by mouth nightly as needed for Sleep. Max Daily Amount: 1 mg. fluticasone 50 mcg/actuation nasal spray Commonly known as:  Paris Moreirad 2 Sprays by Both Nostrils route daily. furosemide 40 mg tablet Commonly known as:  LASIX TAKE ONE-HALF TABLET BY MOUTH ONCE DAILY AS NEEDED  
  
 lisinopril 10 mg tablet Commonly known as:  Katerin Beaufort Take 10 mg by mouth daily. traMADol 50 mg tablet Commonly known as:  ULTRAM  
Take 1 Tab by mouth every six (6) hours as needed for Pain. Max Daily Amount: 200 mg.  
  
 TYLENOL 325 mg tablet Generic drug:  acetaminophen Take  by mouth every four (4) hours as needed for Pain. VITAMIN D3 2,000 unit Tab Generic drug:  cholecalciferol (vitamin D3) Take  by mouth. Prescriptions Printed Refills  
 traMADol (ULTRAM) 50 mg tablet 0 Sig: Take 1 Tab by mouth every six (6) hours as needed for Pain. Max Daily Amount: 200 mg. Class: Print Route: Oral  
  
Prescriptions Sent to Pharmacy Refills  
 fluticasone (FLONASE) 50 mcg/actuation nasal spray 2 Si Sprays by Both Nostrils route daily. Class: Normal  
 Pharmacy: [id number] [place] [address] #: [phone] Route: Both Nostrils We Performed the Following METABOLIC PANEL, BASIC [22690 CPT(R)] Follow-up Instructions Return in about 6 months (around 2018) for bp. Patient Instructions Healthy Upper Back: Exercises Your Care Instructions Here are some examples of exercises for your upper back. Start each exercise slowly. Ease off the exercise if you start to have pain. Your doctor or physical therapist will tell you when you can start these exercises and which ones will work best for you. How to do the exercises Lower neck and upper back stretch 1. Stretch your arms out in front of your body. Clasp one hand on top of your other hand. 2. Gently reach out so that you feel your shoulder blades stretching away from each other. 3. Gently bend your head forward. 4. Hold for 15 to 30 seconds. 5. Repeat 2 to 4 times. Midback stretch If you have knee pain, do not do this exercise. 1. Kneel on the floor, and sit back on your ankles. 2. Lean forward, place your hands on the floor, and stretch your arms out in front of you. Rest your head between your arms. 3. Gently push your chest toward the floor, reaching as far in front of you as possible. 4. Hold for 15 to 30 seconds. 5. Repeat 2 to 4 times. Shoulder rolls 1. Sit comfortably with your feet shoulder-width apart. You can also do this exercise while standing. 2. Roll your shoulders up, then back, and then down in a smooth, circular motion. 3. Repeat 2 to 4 times. Wall push-up 1. Stand against a wall with your feet about 12 to 24 inches back from the wall. If you feel any pain when you do this exercise, stand closer to the wall. 2. Place your hands on the wall slightly wider apart than your shoulders, and lean forward. 3. Gently lean your body toward the wall. Then push back to your starting position. Keep the motion smooth and controlled. 4. Repeat 8 to 12 times. Resisted shoulder blade squeeze For this exercise, you will need elastic exercise material, such as surgical tubing or Thera-Band. 1. Sit or stand, holding the band in both hands in front of you. Keep your elbows close to your sides, bent at a 90-degree angle. Your palms should face up. 2. Squeeze your shoulder blades together, and move your arms to the outside, stretching the band. Be sure to keep your elbows at your sides while you do this. 3. Relax. 4. Repeat 8 to 12 times. Resisted rows For this exercise, you will need elastic exercise material, such as surgical tubing or Thera-Band. 1. Put the band around a solid object, such as a bedpost, at about waist level. Hold one end of the band in each hand. 2. With your elbows at your sides and bent to 90 degrees, pull the band back to move your shoulder blades toward each other. Return to the starting position. 3. Repeat 8 to 12 times. Follow-up care is a key part of your treatment and safety. Be sure to make and go to all appointments, and call your doctor if you are having problems. It's also a good idea to know your test results and keep a list of the medicines you take. Where can you learn more? Go to http://sarah-ingris.info/. Enter O965 in the search box to learn more about \"Healthy Upper Back: Exercises. \" Current as of: March 21, 2017 Content Version: 11.4 © 6067-9983 [employer]. Care instructions adapted under license by [employer] (which disclaims liability or warranty for this information). If you have questions about a medical condition or this instruction, always ask your healthcare professional. [address] any warranty or liability for your use of this information. Introducing [facility]! [facility] introduces [employer] patient portal. Now you can access parts of your medical record, email your doctor's office, and request medication refills online. 1. In your internet browser, go to [email or web address] 2. Click on the First Time User? Click Here link in the Sign In box. You will see the New Member Sign Up page. 3. Enter your [employer] Access Code exactly as it appears below. You will not need to use this code after youve completed the sign-up process. If you do not sign up before the expiration date, you must request a new code. · [employer] Access Code: [email or web address] Expires: 2/6/2018 10:43 AM 
 
4.  Enter the last four digits of your Social Security Number (xxxx) and Date of Birth (mm/dd/yyyy) as indicated and click Submit. You will be taken to the next sign-up page. 5. Create a [employer] ID. This will be your [employer] login ID and cannot be changed, so think of one that is secure and easy to remember. 6. Create a [employer] password. You can change your password at any time. 7. Enter your Password Reset Question and Answer. This can be used at a later time if you forget your password. 8. Enter your e-mail address. You will receive e-mail notification when new information is available in [address] 9. Click Sign Up. You can now view and download portions of your medical record. 10. Click the Download Summary menu link to download a portable copy of your medical information. If you have questions, please visit the Frequently Asked Questions section of the [employer] website. Remember, [employer] is NOT to be used for urgent needs. For medical emergencies, dial 911. Now available from your iPhone and Android! Please provide this summary of care documentation to your next provider. Your primary care clinician is listed as Star Roche. If you have any questions after today's visit, please call [phone].

## 2017-12-19 NOTE — PROGRESS NOTES
HPI  Ms. Toby Manrique is a 81y.o. year old female, she is seen today for follow up HTN. Complains of postnasal drip with green mucous which she will cough up in the morning. No f/c, no chest pain or sob. Only cough is when she clears throat. No edema. Taking coricidin, seems to help some. Was prescribed steroids in June but didn't help symptoms - was seen at urgent care. Says fibromyalgia was worse over the summer. Is completely out of tramadol, takes rarely for severe pain due to fibromyalgia. Lost weight over the summer - has lost 11# in 7 months, was eating less. Since last visit has seen cardiology and nephrology - I don't have nephrology notes. Hasn't been checking bp at home. Complains of chronic posterior headache for 6 mos, better if she takes tylenol. Worse if she doesn't take anything. Shoulders get tight also. Chief Complaint   Patient presents with    Blood Pressure Check     Room 1// NON fasting // eye exam up to date with ?  Head Pain     back of the head pain, was seen by [facility] this summer for sinus infection // still a problem // using Coricidin    Medication Evaluation        Prior to Admission medications    Medication Sig Start Date End Date Taking? Authorizing Provider   lisinopril (PRINIVIL, ZESTRIL) 10 mg tablet  11/28/17  Yes Historical Provider   furosemide (LASIX) 40 mg tablet TAKE ONE-HALF TABLET BY MOUTH ONCE DAILY AS NEEDED 10/30/17  Yes Deng Bang MD   NAPROXEN SODIUM (ALEVE PO) Take  by mouth as needed. Yes Historical Provider   ibuprofen (ADVIL) 200 mg tablet Take 200 mg by mouth as needed for Pain. Yes Historical Provider   cholecalciferol, vitamin D3, (VITAMIN D3) 2,000 unit tab Take  by mouth. Yes Historical Provider   acetaminophen (TYLENOL) 325 mg tablet Take  by mouth every four (4) hours as needed for Pain. Yes Historical Provider   pregabalin (LYRICA) 75 mg capsule Take 1 Cap by mouth daily.  Max Daily Amount: 75 mg. 6/23/17 Juan Miguel Johnson MD   eszopiclone Farah Pare) 1 mg tablet Take 1 Tab by mouth nightly as needed for Sleep. Max Daily Amount: 1 mg. 6/23/17   Juan Miguel Johnson MD   FLUoxetine (PROZAC) 10 mg tablet Take 1 Tab by mouth daily. 6/23/17   Juan Miguel Johnson MD   losartan (COZAAR) 25 mg tablet Take 1 Tab by mouth daily. 5/31/17   Juan Miguel Johnson MD   amLODIPine (NORVASC) 5 mg tablet Take 1 Tab by mouth daily. 5/22/17   Juan Miguel Johnson MD   traMADol Jennifer Flood) 50 mg tablet Take 50 mg by mouth every six (6) hours as needed for Pain. Historical Provider   Cetirizine (ZYRTEC) 10 mg cap Take  by mouth. Historical Provider         No Known Allergies      REVIEW OF SYSTEMS:  Per HPI    PHYSICAL EXAM:  Visit Vitals    /74    Pulse 75    Temp 97.7 °F (36.5 °C) (Oral)    Resp 14    Ht 5' (1.524 m)    Wt 234 lb 6.4 oz (106.3 kg)    SpO2 97%    BMI 45.78 kg/m2     Constitutional: Appears well-developed and well-nourished. No distress. HENT:   Head: Normocephalic and atraumatic. Eyes: No scleral icterus. Nose: nares patent, mucosa normal  Mouth: OP clear without lesions, no pharyngeal exudate  Neck: no lad, no tm, supple, +pain on palpation trapezius b/l  Cardiovascular: Normal S1/S2, regular rhythm. No murmurs, rubs, or gallops. Pulmonary/Chest: Effort normal and breath sounds normal. No respiratory distress. No wheezes, rhonchi, or rales. Ext: No edema. Strength 5/5 b/l UE  Neurological: Alert. Psychiatric: Normal mood and affect.  Behavior is normal.     Lab Results   Component Value Date/Time    Sodium 144 05/18/2017 11:18 AM    Potassium 4.8 05/18/2017 11:18 AM    Chloride 106 05/18/2017 11:18 AM    CO2 24 05/18/2017 11:18 AM    Anion gap 7 05/16/2010 09:05 AM    Glucose 98 05/18/2017 11:18 AM    BUN 24 05/18/2017 11:18 AM    Creatinine 1.04 05/18/2017 11:18 AM    BUN/Creatinine ratio 23 05/18/2017 11:18 AM    GFR est AA 63 05/18/2017 11:18 AM    GFR est non-AA 55 05/18/2017 11:18 AM    Calcium 9.4 05/18/2017 11:18 AM    Bilirubin, total 0.2 04/18/2017 04:24 PM    AST (SGOT) 18 04/18/2017 04:24 PM    Alk. phosphatase 98 04/18/2017 04:24 PM    Protein, total 6.7 04/18/2017 04:24 PM    Albumin 4.5 04/18/2017 04:24 PM    Globulin 3.7 05/16/2010 09:05 AM    A-G Ratio 2.0 04/18/2017 04:24 PM    ALT (SGPT) 15 04/18/2017 04:24 PM     No results found for: HBA1C, HGBE8, AGD9VUJS   Lab Results   Component Value Date/Time    Cholesterol, total 161 05/18/2017 11:18 AM    HDL Cholesterol 51 05/18/2017 11:18 AM    LDL, calculated 90 05/18/2017 11:18 AM    VLDL, calculated 20 05/18/2017 11:18 AM    Triglyceride 100 05/18/2017 11:18 AM          ASSESSMENT/PLAN  Diagnoses and all orders for this visit:    1. Essential hypertension  -     METABOLIC PANEL, BASIC  Controlled on current regimen, continue   2. Obesity, morbid ([address]  Advised increased activity, portion control   3. Fibromyalgia  -     traMADol (ULTRAM) 50 mg tablet; Take 1 Tab by mouth every six (6) hours as needed for Pain. Max Daily Amount: 200 mg. Rare use tramadol,  checked   4. Chronic sinusitis, unspecified location  -     fluticasone (FLONASE) 50 mcg/actuation nasal spray; 2 Sprays by Both Nostrils route daily. Symptoms x months - suspect postnasal drip - add above  5. Chronic tension-type headache, not intractable  Try heat on shoulders, tylenol prn  6. Neck pain          Health Maintenance Due   Topic Date Due    GLAUCOMA SCREENING Q2Y  10/01/2012        Follow-up Disposition:  Return in about 6 months (around 6/19/2018) for bp. Reviewed plan of care. Patient has provided input and agrees with goals. The nurse provided the patient and/or family with advanced directive information if needed and encouraged the patient to provide a copy to the office when available.

## 2017-12-20 LAB
BUN SERPL-MCNC: 23 MG/DL (ref 8–27)
BUN/CREAT SERPL: 27 (ref 12–28)
CALCIUM SERPL-MCNC: 9.8 MG/DL (ref 8.7–10.3)
CHLORIDE SERPL-SCNC: 98 MMOL/L (ref 96–106)
CO2 SERPL-SCNC: 25 MMOL/L (ref 18–29)
CREAT SERPL-MCNC: 0.85 MG/DL (ref 0.57–1)
GLUCOSE SERPL-MCNC: 95 MG/DL (ref 65–99)
POTASSIUM SERPL-SCNC: 4.4 MMOL/L (ref 3.5–5.2)
SODIUM SERPL-SCNC: 141 MMOL/L (ref 134–144)

## 2017-12-27 RX ORDER — FUROSEMIDE 40 MG/1
TABLET ORAL
Qty: 15 TAB | Refills: 1 | Status: SHIPPED | OUTPATIENT
Start: 2017-12-27 | End: 2018-02-27 | Stop reason: SDUPTHER

## 2018-01-29 DIAGNOSIS — M79.7 FIBROMYALGIA: Primary | ICD-10-CM

## 2018-01-29 NOTE — TELEPHONE ENCOUNTER
Pt reports neck pain \"base of head\" and was prescribed Lyrica in the past. States she had a few pills leftover and has taken Lyrica the last few days, once a day, and it has helped. Would like request Lyrica refill. \"has helped tremendously\"    States she will be seen if needed.

## 2018-01-30 RX ORDER — PREGABALIN 75 MG/1
75 CAPSULE ORAL DAILY
Qty: 30 CAP | Refills: 0 | OUTPATIENT
Start: 2018-01-30 | End: 2018-05-29 | Stop reason: SDUPTHER

## 2018-01-30 NOTE — TELEPHONE ENCOUNTER
Prescription for lyrica 75 mg #30 called to [address] [place] [address] per written order of Dr Rosita Leblanc. Prescription left on pharmacy voice mail at 1:47 pm.  Diagnosis given for rx.

## 2018-02-28 RX ORDER — FUROSEMIDE 40 MG/1
TABLET ORAL
Qty: 15 TAB | Refills: 1 | Status: SHIPPED | OUTPATIENT
Start: 2018-02-28 | End: 2018-04-30 | Stop reason: SDUPTHER

## 2018-04-30 RX ORDER — FUROSEMIDE 40 MG/1
TABLET ORAL
Qty: 15 TAB | Refills: 1 | Status: SHIPPED | OUTPATIENT
Start: 2018-04-30 | End: 2018-06-27 | Stop reason: SDUPTHER

## 2018-05-17 ENCOUNTER — OFFICE VISIT (OUTPATIENT)
Dept: INTERNAL MEDICINE CLINIC | Facility: CLINIC | Age: 73
End: 2018-05-17

## 2018-05-17 VITALS
SYSTOLIC BLOOD PRESSURE: 122 MMHG | HEIGHT: 60 IN | WEIGHT: 228 LBS | BODY MASS INDEX: 44.76 KG/M2 | TEMPERATURE: 98.3 F | HEART RATE: 61 BPM | DIASTOLIC BLOOD PRESSURE: 76 MMHG | OXYGEN SATURATION: 97 % | RESPIRATION RATE: 18 BRPM

## 2018-05-17 DIAGNOSIS — R05.9 COUGH: ICD-10-CM

## 2018-05-17 DIAGNOSIS — J02.9 SORE THROAT: ICD-10-CM

## 2018-05-17 DIAGNOSIS — J01.90 ACUTE RHINOSINUSITIS: Primary | ICD-10-CM

## 2018-05-17 LAB
FLUAV+FLUBV AG NOSE QL IA.RAPID: NEGATIVE POS/NEG
FLUAV+FLUBV AG NOSE QL IA.RAPID: NEGATIVE POS/NEG
S PYO AG THROAT QL: NEGATIVE
VALID INTERNAL CONTROL?: YES
VALID INTERNAL CONTROL?: YES

## 2018-05-17 RX ORDER — BENZONATATE 100 MG/1
100 CAPSULE ORAL
Qty: 21 CAP | Refills: 0 | Status: SHIPPED | OUTPATIENT
Start: 2018-05-17 | End: 2018-05-24

## 2018-05-17 RX ORDER — FLUTICASONE PROPIONATE 50 MCG
2 SPRAY, SUSPENSION (ML) NASAL DAILY
Qty: 1 BOTTLE | Refills: 0 | Status: SHIPPED | OUTPATIENT
Start: 2018-05-17 | End: 2018-07-03

## 2018-05-17 RX ORDER — AZITHROMYCIN 250 MG/1
TABLET, FILM COATED ORAL
Qty: 6 TAB | Refills: 0 | Status: SHIPPED | OUTPATIENT
Start: 2018-05-17 | End: 2018-07-03 | Stop reason: ALTCHOICE

## 2018-05-17 NOTE — PROGRESS NOTES
CC:   Chief Complaint   Patient presents with    Fever    Cough       HISTORY OF PRESENT ILLNESS  Sarika Meek is a 81 y.o. female. Patient complains of 2 day history of non-productive cough, much postnasal drainage worse in the mornings, sore/scratchy throat, subjective fevers, sneezing, sinus congestion, and diffuse muscle aches (not relieved with Tramadol). Took off from work today because she was feeling poorly. Taking Coricidin and Nyquil. Works as a  at [employer] and has been exposed to several coughing people. Denies chills, headaches, hoarseness, ear pains, dyspnea, wheezing, hemoptysis, chest pain, myalgias, abdominal pain, and diarrhea. She did not receive a flu vaccine. Reports she has been taking hemp oil 3 drops daily for 2 weeks. Feels like it helps with her fibromyalgia symptoms. 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  acetaminophen/chlorpheniramine (CORICIDIN PO) Take  by mouth.  furosemide (LASIX) 40 mg tablet TAKE 1/2 (ONE-HALF) TABLET BY MOUTH ONCE DAILY AS NEEDED 15 Tab 1    pregabalin (LYRICA) 75 mg capsule Take 1 Cap by mouth daily. Max Daily Amount: 75 mg. 30 Cap 0    lisinopril (PRINIVIL, ZESTRIL) 10 mg tablet Take 10 mg by mouth daily.  traMADol (ULTRAM) 50 mg tablet Take 1 Tab by mouth every six (6) hours as needed for Pain. Max Daily Amount: 200 mg. 30 Tab 0    acetaminophen (TYLENOL) 325 mg tablet Take  by mouth every four (4) hours as needed for Pain.            PHYSICAL EXAM  Visit Vitals    /76 (BP 1 Location: Left arm, BP Patient Position: Sitting)    Pulse 61    Temp 98.3 °F (36.8 °C) (Oral)    Resp 18    Ht 5' (1.524 m)    Wt 228 lb (103.4 kg)    SpO2 97%    BMI 44.53 kg/m2       General: Morbidly obese, pleasant, no distress. Coughs occasionally. HEENT:  Head normocephalic/atraumatic, no scleral icterus or conjunctival injection. Nasal mucosa edematous. Oropharynx edematous. No sinus tenderness. TM's and ear canals normal bilaterally. Neck: Supple. No lymphadenopathy. Lungs:  Clear to ausculation bilaterally. Good air movement. Heart:  Regular rate and rhythm, normal S1 and S2, no murmur, gallop, or rub  Neurological: Alert and oriented. Psychiatric: Normal mood and affect. Behavior is normal.       Results for orders placed or performed in visit on 05/17/18   AMB POC RAPID STREP A   Result Value Ref Range    VALID INTERNAL CONTROL POC Yes     Group A Strep Ag Negative Negative   AMB POC MELLISSA INFLUENZA A/B TEST   Result Value Ref Range    VALID INTERNAL CONTROL POC Yes     Influenza A Ag POC Negative Negative Pos/Neg    Influenza B Ag POC Negative Negative Pos/Neg           ASSESSMENT AND PLAN    ICD-10-CM ICD-9-CM    1. Acute rhinosinusitis J01.90 461.9 benzonatate (TESSALON) 100 mg capsule      azithromycin (ZITHROMAX) 250 mg tablet      fluticasone (FLONASE) 50 mcg/actuation nasal spray   2. Cough R05 786.2 AMB POC RAPID STREP A      AMB POC MELLISSA INFLUENZA A/B TEST   3. Sore throat J02.9 462 AMB POC RAPID STREP A      AMB POC MELLISSA INFLUENZA A/B TEST       Diagnoses and all orders for this visit:    1. Acute rhinosinusitis  -     Start benzonatate (TESSALON) 100 mg capsule; Take 1 Cap by mouth three (3) times daily as needed for Cough for up to 7 days.  -     Start azithromycin (ZITHROMAX) 250 mg tablet; Take 2 tabs by mouth on day 1 and 1 tab daily on days 2-4.  -     Refill fluticasone (FLONASE) 50 mcg/actuation nasal spray; 2 Sprays by Both Nostrils route daily. For sinus drainage.  -     Given work excuse note. RTC on 5/19/18 (her next scheduled work day).     2. Cough  Negative rapid strep and flu tests. -     AMB POC RAPID STREP A  -     AMB POC MELLISSA INFLUENZA A/B TEST    3. Sore throat  -     AMB POC RAPID STREP A  -     AMB POC MELLISSA INFLUENZA A/B TEST      Follow-up Disposition:  Return if symptoms worsen or fail to improve, for Scheduled appt with Dr. Oracio Horton on 7/3/18. Provided patient and/or family with advanced directive information and answered pertinent questions. Encouraged patient to provide a copy of advanced directive to the office when available. I have discussed the diagnosis with the patient and the intended plan as seen in the above orders. Patient is in agreement. The patient has received an after-visit summary and questions were answered concerning future plans. I have discussed medication side effects and warnings with the patient as well.

## 2018-05-17 NOTE — PROGRESS NOTES
Ynesvish Wade  Identified pt with two pt identifiers(name and ). Chief Complaint   Patient presents with    Fever    Cough       1. Have you been to the ER, urgent care clinic since your last visit? Hospitalized since your last visit? Seen at [facility] for arm pain    2. Have you seen or consulted any other health care providers outside of the [address] [place] [address] since your last visit? Include any pap smears or colon screening. NO    Today's provider has been notified of reason for visit, vitals and flowsheets obtained on patients. Patient received paperwork for advance directive during previous visit but has not completed at this time     Reviewed record In preparation for visit, huddled with provider and have obtained necessary documentation      Health Maintenance Due   Topic    GLAUCOMA SCREENING Q2Y     Pneumococcal 65+ Low/Medium Risk (2 of 2 - PPSV23)    MEDICARE YEARLY EXAM        Wt Readings from Last 3 Encounters:   18 228 lb (103.4 kg)   17 234 lb 6.4 oz (106.3 kg)   17 242 lb 4.8 oz (109.9 kg)     Temp Readings from Last 3 Encounters:   18 98.3 °F (36.8 °C) (Oral)   17 97.7 °F (36.5 °C) (Oral)   17 98.2 °F (36.8 °C) (Oral)     BP Readings from Last 3 Encounters:   18 122/76   17 130/74   17 150/82     Pulse Readings from Last 3 Encounters:   18 61   17 75   17 (!) 55     Vitals:    18 0955   BP: 122/76   Pulse: 61   Resp: 18   Temp: 98.3 °F (36.8 °C)   TempSrc: Oral   SpO2: 97%   Weight: 228 lb (103.4 kg)   Height: 5' (1.524 m)   PainSc:   6         Learning Assessment:  :     No flowsheet data found. Depression Screening:  :     PHQ over the last two weeks 2018   Little interest or pleasure in doing things Not at all   Feeling down, depressed or hopeless Not at all   Total Score PHQ 2 0       Fall Risk Assessment:  :     Fall Risk Assessment, last 12 mths 2018   Able to walk?  Yes   Fall in past 12 months? Yes   Fall with injury? No   Number of falls in past 12 months 1   Fall Risk Score 1       Abuse Screening:  :     Abuse Screening Questionnaire 5/17/2018   Do you ever feel afraid of your partner? N   Are you in a relationship with someone who physically or mentally threatens you? N   Is it safe for you to go home? Y       ADL Screening:  :     ADL Assessment 5/17/2018   Feeding yourself No Help Needed   Getting from bed to chair No Help Needed   Getting dressed No Help Needed   Bathing or showering No Help Needed   Walk across the room (includes cane/walker) No Help Needed   Using the telphone No Help Needed   Taking your medications No Help Needed   Preparing meals No Help Needed   Managing money (expenses/bills) No Help Needed   Moderately strenuous housework (laundry) No Help Needed   Shopping for personal items (toiletries/medicines) No Help Needed   Shopping for groceries No Help Needed   Driving No Help Needed   Climbing a flight of stairs No Help Needed   Getting to places beyond walking distances No Help Needed       Per Dr. Valentino Actis verbal order read back orders placed for poc strep and flu. Medication reconciliation up to date and corrected with patient at this time.

## 2018-05-17 NOTE — MR AVS SNAPSHOT
[address] [place] [address] [phone] Patient: Gopal De La Rosa MRN: [id number] :1945 Visit Information Date & Time Provider Department Dept. Phone Encounter #  
 2018 10:10 AM Fartun Covarrubias MD [facility] [address] [id number] Follow-up Instructions Return if symptoms worsen or fail to improve, for Scheduled appt with Dr. Terry Hobbs on 7/3/18. Your Appointments 7/3/2018  2:30 PM  
ROUTINE CARE with Mimi Avila MD  
[facility]) Appt Note: 6mth f/u;  bp.; 6mth f/u; bp $0cp 03.13.18 Kg; 6mth f/u; bp $0cp 03.13.18 Kg  
 [address] 
[facility] [address] [phone]  
  
   
 [address] [place] [address] Upcoming Health Maintenance Date Due  
 GLAUCOMA SCREENING Q2Y 10/1/2012 Pneumococcal 65+ Low/Medium Risk (2 of 2 - PPSV23) 2018 MEDICARE YEARLY EXAM 2018 Influenza Age 5 to Adult 2018 BREAST CANCER SCRN MAMMOGRAM 2019 DTaP/Tdap/Td series (2 - Td) 2027 Allergies as of 2018  Review Complete On: 2018 By: Fartun Covarrubias MD  
 No Known Allergies Current Immunizations  Never Reviewed No immunizations on file. Not reviewed this visit You Were Diagnosed With   
  
 Codes Comments Acute rhinosinusitis    -  Primary ICD-10-CM: J01.90 ICD-9-CM: 461.9 Cough     ICD-10-CM: R05 ICD-9-CM: 786.2 Sore throat     ICD-10-CM: J02.9 ICD-9-CM: 267 Vitals BP Pulse Temp Resp Height(growth percentile) Weight(growth percentile) 122/76 (BP 1 Location: Left arm, BP Patient Position: Sitting) 61 98.3 °F (36.8 °C) (Oral) 18 5' (1.524 m) 228 lb (103.4 kg) SpO2 BMI OB Status Smoking Status 97% 44.53 kg/m2 Postmenopausal Never Smoker Vitals History BMI and BSA Data Body Mass Index Body Surface Area 44.53 kg/m 2 2.09 m 2 Preferred Pharmacy Pharmacy Name Phone [facility] [address] [facility], [place] [phone] Your Updated Medication List  
  
   
This list is accurate as of 5/17/18 10:34 AM.  Always use your most recent med list.  
  
  
  
  
 azithromycin 250 mg tablet Commonly known as:  [place] Vanna Take 2 tabs by mouth on day 1 and 1 tab daily on days 2-4.  
  
 benzonatate 100 mg capsule Commonly known as:  TESSALON Take 1 Cap by mouth three (3) times daily as needed for Cough for up to 7 days. CORICIDIN PO Take  by mouth. fluticasone 50 mcg/actuation nasal spray Commonly known as:  Marianne Harford 2 Sprays by Both Nostrils route daily. For sinus drainage. furosemide 40 mg tablet Commonly known as:  LASIX TAKE 1/2 (ONE-HALF) TABLET BY MOUTH ONCE DAILY AS NEEDED  
  
 lisinopril 10 mg tablet Commonly known as:  Kathyleen Ronal Take 10 mg by mouth daily. NYQUIL PO Take  by mouth.  
  
 pregabalin 75 mg capsule Commonly known as:  Becca Crystal Take 1 Cap by mouth daily. Max Daily Amount: 75 mg.  
  
 traMADol 50 mg tablet Commonly known as:  ULTRAM  
Take 1 Tab by mouth every six (6) hours as needed for Pain. Max Daily Amount: 200 mg.  
  
 TYLENOL 325 mg tablet Generic drug:  acetaminophen Take  by mouth every four (4) hours as needed for Pain. Prescriptions Sent to Pharmacy Refills  
 benzonatate (TESSALON) 100 mg capsule 0 Sig: Take 1 Cap by mouth three (3) times daily as needed for Cough for up to 7 days. Class: Normal  
 Pharmacy: [address] [place] [address] Ph #: [phone] Route: Oral  
 azithromycin (ZITHROMAX) 250 mg tablet 0 Sig: Take 2 tabs by mouth on day 1 and 1 tab daily on days 2-4. Class: Normal  
 Pharmacy: [address] [place] [address] Ph #: [phone] fluticasone (FLONASE) 50 mcg/actuation nasal spray 0 Si Sprays by Both Nostrils route daily. For sinus drainage. Class: Normal  
 Pharmacy: [facility] [address] PALOM[address] [place] [address], [address] [place] [address] #: [phone] Route: Both Nostrils We Performed the Following AMB POC RAPID STREP A [16362 CPT(R)] AMB POC MELLISSA INFLUENZA A/B TEST [43739 CPT(R)] Follow-up Instructions Return if symptoms worsen or fail to improve, for Scheduled appt with  [employer] [facility] on 7/3/18. Patient Instructions Sinusitis: Care Instructions Your Care Instructions Sinusitis is an infection of the lining of the sinus cavities in your head. Sinusitis often follows a cold. It causes pain and pressure in your head and face. In most cases, sinusitis gets better on its own in 1 to 2 weeks. But some mild symptoms may last for several weeks. Sometimes antibiotics are needed. Follow-up care is a key part of your treatment and safety. Be sure to make and go to all appointments, and call your doctor if you are having problems. It's also a good idea to know your test results and keep a list of the medicines you take. How can you care for yourself at home? · Take an over-the-counter pain medicine, such as acetaminophen (Tylenol), ibuprofen (Advil, Motrin), or naproxen (Aleve). Read and follow all instructions on the label. · If the doctor prescribed antibiotics, take them as directed. Do not stop taking them just because you feel better. You need to take the full course of antibiotics. · Be careful when taking over-the-counter cold or flu medicines and Tylenol at the same time. Many of these medicines have acetaminophen, which is Tylenol. Read the labels to make sure that you are not taking more than the recommended dose. Too much acetaminophen (Tylenol) can be harmful.  
· Breathe warm, moist air from a steamy shower, a hot bath, or a sink filled with hot water. Avoid cold, dry air. Using a humidifier in your home may help. Follow the directions for cleaning the machine. · Use saline (saltwater) nasal washes to help keep your nasal passages open and wash out mucus and bacteria. You can buy saline nose drops at a grocery store or drugstore. Or you can make your own at home by adding 1 teaspoon of salt and 1 teaspoon of baking soda to 2 cups of distilled water. If you make your own, fill a bulb syringe with the solution, insert the tip into your nostril, and squeeze gently. Michelle Stone your nose. · Put a hot, wet towel or a warm gel pack on your face 3 or 4 times a day for 5 to 10 minutes each time. · Try a decongestant nasal spray like oxymetazoline (Afrin). Do not use it for more than 3 days in a row. Using it for more than 3 days can make your congestion worse. When should you call for help? Call your doctor now or seek immediate medical care if: 
? · You have new or worse swelling or redness in your face or around your eyes. ? · You have a new or higher fever. ? Watch closely for changes in your health, and be sure to contact your doctor if: 
? · You have new or worse facial pain. ? · The mucus from your nose becomes thicker (like pus) or has new blood in it. ? · You are not getting better as expected. Where can you learn more? Go to http://sarah-ingris.info/. Enter B157 in the search box to learn more about \"Sinusitis: Care Instructions. \" Current as of: May 12, 2017 Content Version: 11.4 © 6138-5954 [employer]. Care instructions adapted under license by [employer] (which disclaims liability or warranty for this information). If you have questions about a medical condition or this instruction, always ask your healthcare professional. [address]  any warranty or liability for your use of this information. Introducing [facility]! [facility] introduces [employer] patient portal. Now you can access parts of your medical record, email your doctor's office, and request medication refills online. 1. In your internet browser, go to [email or web address] 2. Click on the First Time User? Click Here link in the Sign In box. You will see the New Member Sign Up page. 3. Enter your [employer] Access Code exactly as it appears below. You will not need to use this code after youve completed the sign-up process. If you do not sign up before the expiration date, you must request a new code. · [employer] Access Code: [email or web address] Expires: 8/15/2018 10:33 AM 
 
4. Enter the last four digits of your Social Security Number (xxxx) and Date of Birth (mm/dd/yyyy) as indicated and click Submit. You will be taken to the next sign-up page. 5. Create a [employer]Feeder ID. This will be your [employer] login ID and cannot be changed, so think of one that is secure and easy to remember. 6. Create a [employer] password. You can change your password at any time. 7. Enter your Password Reset Question and Answer. This can be used at a later time if you forget your password. 8. Enter your e-mail address. You will receive e-mail notification when new information is available in [address] 9. Click Sign Up. You can now view and download portions of your medical record. 10. Click the Download Summary menu link to download a portable copy of your medical information. If you have questions, please visit the Frequently Asked Questions section of the [employer] website. Remember, [employer] is NOT to be used for urgent needs. For medical emergencies, dial 911. Now available from your iPhone and Android! Please provide this summary of care documentation to your next provider. Your primary care clinician is listed as Star Roche. If you have any questions after today's visit, please call [phone].

## 2018-05-17 NOTE — LETTER
NOTIFICATION RETURN TO WORK / SCHOOL 
 
5/17/2018 10:34 AM 
 
Ms. Leisa Dover [address] [place] [phone] To Whom It May Concern: 
 
Leisa Dover is currently under the care of [address] [place] [address]. She will return to work/school on: 5/19/18. If there are questions or concerns please have the patient contact our office. Sincerely, Shauna Quiroz MD

## 2018-05-17 NOTE — PATIENT INSTRUCTIONS
Sinusitis: Care Instructions  Your Care Instructions    Sinusitis is an infection of the lining of the sinus cavities in your head. Sinusitis often follows a cold. It causes pain and pressure in your head and face. In most cases, sinusitis gets better on its own in 1 to 2 weeks. But some mild symptoms may last for several weeks. Sometimes antibiotics are needed. Follow-up care is a key part of your treatment and safety. Be sure to make and go to all appointments, and call your doctor if you are having problems. It's also a good idea to know your test results and keep a list of the medicines you take. How can you care for yourself at home? · Take an over-the-counter pain medicine, such as acetaminophen (Tylenol), ibuprofen (Advil, Motrin), or naproxen (Aleve). Read and follow all instructions on the label. · If the doctor prescribed antibiotics, take them as directed. Do not stop taking them just because you feel better. You need to take the full course of antibiotics. · Be careful when taking over-the-counter cold or flu medicines and Tylenol at the same time. Many of these medicines have acetaminophen, which is Tylenol. Read the labels to make sure that you are not taking more than the recommended dose. Too much acetaminophen (Tylenol) can be harmful. · Breathe warm, moist air from a steamy shower, a hot bath, or a sink filled with hot water. Avoid cold, dry air. Using a humidifier in your home may help. Follow the directions for cleaning the machine. · Use saline (saltwater) nasal washes to help keep your nasal passages open and wash out mucus and bacteria. You can buy saline nose drops at a grocery store or drugstore. Or you can make your own at home by adding 1 teaspoon of salt and 1 teaspoon of baking soda to 2 cups of distilled water. If you make your own, fill a bulb syringe with the solution, insert the tip into your nostril, and squeeze gently. Macel Halt your nose.   · Put a hot, wet towel or a warm gel pack on your face 3 or 4 times a day for 5 to 10 minutes each time. · Try a decongestant nasal spray like oxymetazoline (Afrin). Do not use it for more than 3 days in a row. Using it for more than 3 days can make your congestion worse. When should you call for help? Call your doctor now or seek immediate medical care if:  ? · You have new or worse swelling or redness in your face or around your eyes. ? · You have a new or higher fever. ? Watch closely for changes in your health, and be sure to contact your doctor if:  ? · You have new or worse facial pain. ? · The mucus from your nose becomes thicker (like pus) or has new blood in it. ? · You are not getting better as expected. Where can you learn more? Go to http://sarah-ingris.info/. Enter G584 in the search box to learn more about \"Sinusitis: Care Instructions. \"  Current as of: May 12, 2017  Content Version: 11.4  © 2380-4057 [employer]. Care instructions adapted under license by [employer] (which disclaims liability or warranty for this information). If you have questions about a medical condition or this instruction, always ask your healthcare professional. [address] any warranty or liability for your use of this information.

## 2018-05-29 ENCOUNTER — OFFICE VISIT (OUTPATIENT)
Dept: INTERNAL MEDICINE CLINIC | Facility: CLINIC | Age: 73
End: 2018-05-29

## 2018-05-29 VITALS
BODY MASS INDEX: 44.76 KG/M2 | RESPIRATION RATE: 20 BRPM | OXYGEN SATURATION: 93 % | DIASTOLIC BLOOD PRESSURE: 63 MMHG | TEMPERATURE: 98.3 F | WEIGHT: 228 LBS | HEIGHT: 60 IN | SYSTOLIC BLOOD PRESSURE: 95 MMHG | HEART RATE: 75 BPM

## 2018-05-29 DIAGNOSIS — M79.7 FIBROMYALGIA: ICD-10-CM

## 2018-05-29 DIAGNOSIS — J32.9 CHRONIC SINUSITIS, UNSPECIFIED LOCATION: Primary | ICD-10-CM

## 2018-05-29 RX ORDER — CETIRIZINE HCL 10 MG
5 TABLET ORAL
COMMUNITY
End: 2019-12-16

## 2018-05-29 RX ORDER — AMOXICILLIN AND CLAVULANATE POTASSIUM 875; 125 MG/1; MG/1
1 TABLET, FILM COATED ORAL EVERY 12 HOURS
Qty: 20 TAB | Refills: 0 | Status: SHIPPED | OUTPATIENT
Start: 2018-05-29 | End: 2018-06-08

## 2018-05-29 RX ORDER — PREGABALIN 75 MG/1
CAPSULE ORAL
Qty: 30 CAP | Refills: 0 | OUTPATIENT
Start: 2018-05-29 | End: 2018-07-03 | Stop reason: SDUPTHER

## 2018-05-29 NOTE — PROGRESS NOTES
HPI  Suzy Harrington is a 81y.o. year old female patient of Jaydon Solis MD who presents with c/o sinus infection. Pt has history of has Essential hypertension, Fibromyalgia, Anisocoria, and Obesity, morbid ([address] on her problem list..    Pt c/o continued sinus sx. C/o frontal sinus pain/pressure, HA, yellow nasal discharge. States in October 2016 she helped clean out her Aunt's house and there were mice, mold, and previous cats which she believed started this cycle of sinus issues. She has never seen ENT. Denies f/c. Denies SOB, cough, or wheezing. Denies myalgias (other than chronic fibromyalgia). She c/o sneezing and PND. Denies sore throat or itchy/watery eyes. She just started taking Zyrtec again after not taking it for three years. Has never tried saline rinse. Just started using nasal spray again. Has noticed some improvement with that. Pt was seen by Dr. Leilani Martinez on 5/17 and treated for acute rhinosinusitis with azithromycin. At that time she tested negative for influenza and strept throat. Per Dr. Pam De Santiago note has a hx of chronic sinusitis- was recommended Flonase in the past. Also has hx of chronic tension-type HA for which she takes Tylenol. Patient Active Problem List   Diagnosis Code    Essential hypertension I10    Fibromyalgia M79.7    Anisocoria H57.02    Obesity, morbid ([address] E66.01     Past Medical History:   Diagnosis Date    Hypertension      No past surgical history on file.   Social History     Social History    Marital status: SINGLE     Spouse name: N/A    Number of children: N/A    Years of education: N/A     Social History Main Topics    Smoking status: Never Smoker    Smokeless tobacco: Never Used    Alcohol use No    Drug use: No    Sexual activity: No     Other Topics Concern    Not on file     Social History Narrative     Family History   Problem Relation Age of Onset    Diabetes Mother     Heart Disease Mother     Hypertension Mother     Hypertension Father     Heart Disease Father      No Known Allergies    MEDICATIONS  Current Outpatient Prescriptions   Medication Sig    LYRICA 75 mg capsule TAKE ONE CAPSULE BY MOUTH ONCE DAILY MAX  75  MG  PER/DAY    DM/p-ephed/acetaminoph/doxylam (NYQUIL PO) Take  by mouth.  acetaminophen/chlorpheniramine (CORICIDIN PO) Take  by mouth.  azithromycin (ZITHROMAX) 250 mg tablet Take 2 tabs by mouth on day 1 and 1 tab daily on days 2-4.  fluticasone (FLONASE) 50 mcg/actuation nasal spray 2 Sprays by Both Nostrils route daily. For sinus drainage.  furosemide (LASIX) 40 mg tablet TAKE 1/2 (ONE-HALF) TABLET BY MOUTH ONCE DAILY AS NEEDED    lisinopril (PRINIVIL, ZESTRIL) 10 mg tablet Take 10 mg by mouth daily.  traMADol (ULTRAM) 50 mg tablet Take 1 Tab by mouth every six (6) hours as needed for Pain. Max Daily Amount: 200 mg.  acetaminophen (TYLENOL) 325 mg tablet Take  by mouth every four (4) hours as needed for Pain. No current facility-administered medications for this visit. REVIEW OF SYSTEMS  Per HPI        Visit Vitals    BP 95/63 (BP 1 Location: Left arm, BP Patient Position: Sitting)    Pulse 75    Temp 98.3 °F (36.8 °C) (Oral)    Resp 20    Ht 5' (1.524 m)    Wt 228 lb (103.4 kg)    SpO2 93%    BMI 44.53 kg/m2         General: Well-developed, well-nourished. In no distress. A&O x 3. Head: Normocephalic, atraumatic. Eyes: Conjunctiva clear. Pupils equal, round, reactive to light. Extraocular movements intact. Ears/Nose: TM's and ear canals normal bilaterally. Nares normal. Septum midline. Moderately swollen and erythematous nasal turbinates. No discharge. Frontal and ethmoid sinus tenderness. Mouth/Throat: Lips, mucosa, and tongue normal. Oropharynx erythematous consistent with PND. Neck: Supple, symmetrical, trachea midline, no lymphadenopathy, no carotid bruits, no JVD, thyroid: not enlarged, symmetric, no tenderness/mass/nodules. Lungs: Clear to auscultation bilaterally. No crackles or wheezes. No use of accessory muscles. Speaks in full sentences without SOB. Chest Wall: No tenderness or deformity. Heart: RRR, normal S1 and S2, no murmur, click, rub, or gallop. Skin: No rashes or lesions. Musculoskeletal: Gait normal.   Psychiatric: Normal mood and affect. Behavior is normal.           ASSESSMENT and PLAN  Diagnoses and all orders for this visit:    Discussed with pt options including trying a different antibiotic vs referring to ENT. She may benefit from a nasal smear or nasal endoscopy, or allergy testing. She would like to try abx and saline rinses first. If sx continues will refer to [address] [place] [address] ENT in [place]. 1. Chronic sinusitis, unspecified location  -     amoxicillin-clavulanate (AUGMENTIN) 875-125 mg per tablet; Take 1 Tab by mouth every twelve (12) hours for 10 days.  -saline rinses BID  -Continue zyrtec and flonase everyday          Patient Instructions          Chronic Sinusitis: Care Instructions  Your Care Instructions    Sinusitis is an infection of the lining of the sinus cavities in your head. It causes pain and pressure in your head and face. Sinusitis can be short-term (acute) or long-term (chronic). Chronic sinusitis lasts 12 weeks or longer. It is often caused by a bacterial or fungal infection. Other things, such as allergies, may also be involved. Chronic sinusitis may be hard to treat. It can lead to permanent changes in the mucous membranes that line the sinuses. It may make future sinus infections more likely. The infection may take some time to treat. Antibiotics are usually used if the infection is caused by bacteria. You may also need to use a corticosteroid nasal spray. If the infection is not cured after you try two or more different antibiotics, you may want to talk with your doctor about surgery or allergy testing. If the sinusitis is caused by a fungal infection, you may need to take antifungals or other medicines.  You may also need surgery. Follow-up care is a key part of your treatment and safety. Be sure to make and go to all appointments, and call your doctor if you are having problems. It's also a good idea to know your test results and keep a list of the medicines you take. How can you care for yourself at home? Medicines  ? · Be safe with medicines. Take your medicines exactly as prescribed. Call your doctor if you think you are having a problem with your medicine. You will get more details on the specific medicines your doctor prescribes. ? · Take your antibiotics as directed. Do not stop taking them just because you feel better. You need to take the full course of antibiotics. ? · Your doctor may recommend a corticosteroid nasal spray, wash, drops, or pills. Take this medicine exactly as prescribed. ?At home  ? · Breathe warm, moist air. You can use a steamy shower, a hot bath, or a sink filled with hot water. Avoid cold, dry air. Using a humidifier in your home may help. Follow the instructions for cleaning the machine. ? · Use saline (saltwater) nasal washes every day. This helps keep your nasal passages open. It also can wash out mucus and bacteria. ¨ You can buy saline nose drops at a grocery store or drugstore. ¨ You can make your own at home. Add 1 teaspoon of salt and 1 teaspoon of baking soda to 2 cups of distilled water. If you make your own, fill a bulb syringe with the solution. Then insert the tip into your nostril and squeeze gently. Parminder Florentino your nose. ? · Put a warm, wet towel or a warm gel pack on your face 3 or 4 times a day. Leave it on 5 to 10 minutes each time. ? · Do not smoke or breathe secondhand smoke. Smoking can make sinusitis worse. If you need help quitting, talk to your doctor about stop-smoking programs and medicines. These can increase your chances of quitting for good. When should you call for help?   Call your doctor now or seek immediate medical care if:  ? · You have new or worse symptoms of infection, such as:  ¨ Increased pain, swelling, warmth, or redness. ¨ Red streaks leading from the area. ¨ Pus draining from the area. ¨ A fever. ? Watch closely for changes in your health, and be sure to contact your doctor if:  ? · The mucus from your nose becomes thicker (like pus) or has new blood in it. ? · You do not get better as expected. Where can you learn more? Go to http://sarah-ingris.info/. Enter H018 in the search box to learn more about \"Chronic Sinusitis: Care Instructions. \"  Current as of: May 12, 2017  Content Version: 11.4  © 2496-1093 [employer]. Care instructions adapted under license by [employer] (which disclaims liability or warranty for this information). If you have questions about a medical condition or this instruction, always ask your healthcare professional. [place] [address] any warranty or liability for your use of this information. Saline Nasal Washes: Care Instructions  Your Care Instructions  Saline nasal washes help keep the nasal passages open by washing out thick or dried mucus. This simple remedy can help relieve symptoms of allergies, sinusitis, and colds. It also can make the nose feel more comfortable by keeping the mucous membranes moist. You may notice a little burning sensation in your nose the first few times you use the solution, but this usually gets better in a few days. Follow-up care is a key part of your treatment and safety. Be sure to make and go to all appointments, and call your doctor if you are having problems. It's also a good idea to know your test results and keep a list of the medicines you take. How can you care for yourself at home? · You can buy premixed saline solution in a squeeze bottle or other sinus rinse products at a drugstore. Read and follow the instructions on the label.   · You also can make your own saline solution by adding 1 teaspoon of salt and 1 teaspoon of baking soda to 2 cups of distilled water. · If you use a homemade solution, pour a small amount into a clean bowl. Using a rubber bulb syringe, squeeze the syringe and place the tip in the salt water. Pull a small amount of the salt water into the syringe by relaxing your hand. · Sit down with your head tilted slightly back. Do not lie down. Put the tip of the bulb syringe or the squeeze bottle a little way into one of your nostrils. Gently drip or squirt a few drops into the nostril. Repeat with the other nostril. Some sneezing and gagging are normal at first.  · Gently blow your nose. · Wipe the syringe or bottle tip clean after each use. · Repeat this 2 or 3 times a day. · Use nasal washes gently if you have nosebleeds often. When should you call for help? Watch closely for changes in your health, and be sure to contact your doctor if:  ? · You often get nosebleeds. ? · You have problems doing the nasal washes. Where can you learn more? Go to http://sarah"[employer]ingris.info/. Enter [id number] [phone] in the search box to learn more about \"Saline Nasal Washes: Care Instructions. \"  Current as of: May 12, 2017  Content Version: 11.4  © 8629-2545 [employer]. Care instructions adapted under license by [employer] (which disclaims liability or warranty for this information). If you have questions about a medical condition or this instruction, always ask your healthcare professional. [place] [address] any warranty or liability for your use of this information. Please keep your follow-up appointment with Christen Perry MD.     Follow-up Disposition:  Return if symptoms worsen or fail to improve, for Keep f/u with  [facility] 7/3/18.     Health Maintenance Due   Topic Date Due    GLAUCOMA SCREENING Q2Y  10/01/2012    Pneumococcal 65+ Low/Medium Risk (2 of 2 - PPSV23) 04/18/2018    MEDICARE YEARLY EXAM  05/12/2018       I have discussed the diagnosis with the patient and the intended plan as seen in the above orders. Patient is in agreement. The patient has received an after-visit summary and questions were answered concerning future plans. I have discussed medication side effects and warnings with the patient as well. Warning signs for the above conditions were discussed including when to call our office or go to the emergency room. The nurse provided the patient and/or family with advanced directive information if needed and encouraged the patient to provide a copy to the office when available.

## 2018-05-29 NOTE — PROGRESS NOTES
Analy Jiang  Identified pt with two pt identifiers(name and ). Chief Complaint   Patient presents with    Sinus Infection     Rm 4A: headache, nasel drainage down throat/nose       Reviewed record In preparation for visit and have obtained necessary documentation. Advanced directive / living will on file. 1. Have you been to the ER, urgent care clinic or hospitalized since your last visit? No     2. Have you seen or consulted any other health care providers outside of the [address] [place] [address] since your last visit? Include any pap smears or colon screening. No    Vitals reviewed with provider. Health Maintenance reviewed:     Health Maintenance Due   Topic    GLAUCOMA SCREENING Q2Y     Pneumococcal 65+ Low/Medium Risk (2 of 2 - PPSV23)    MEDICARE YEARLY EXAM           Wt Readings from Last 3 Encounters:   18 228 lb (103.4 kg)   18 228 lb (103.4 kg)   17 234 lb 6.4 oz (106.3 kg)        Temp Readings from Last 3 Encounters:   18 98.3 °F (36.8 °C) (Oral)   18 98.3 °F (36.8 °C) (Oral)   17 97.7 °F (36.5 °C) (Oral)        BP Readings from Last 3 Encounters:   18 95/63   18 122/76   17 130/74        Pulse Readings from Last 3 Encounters:   18 75   18 61   17 75        Vitals:    18 1059   BP: 95/63   Pulse: 75   Resp: 20   Temp: 98.3 °F (36.8 °C)   TempSrc: Oral   SpO2: 93%   Weight: 228 lb (103.4 kg)   Height: 5' (1.524 m)          Learning Assessment:   :     No flowsheet data found. Depression Screening:   :       PHQ over the last two weeks 2018   Little interest or pleasure in doing things Not at all   Feeling down, depressed or hopeless Not at all   Total Score PHQ 2 0        Fall Risk Assessment:   :       Fall Risk Assessment, last 12 mths 2018   Able to walk? Yes   Fall in past 12 months? Yes   Fall with injury?  No   Number of falls in past 12 months 1   Fall Risk Score 1        Abuse Screening:   :       Abuse Screening Questionnaire 5/17/2018   Do you ever feel afraid of your partner? N   Are you in a relationship with someone who physically or mentally threatens you? N   Is it safe for you to go home?  Y        ADL Screening:   :       ADL Assessment 5/17/2018   Feeding yourself No Help Needed   Getting from bed to chair No Help Needed   Getting dressed No Help Needed   Bathing or showering No Help Needed   Walk across the room (includes cane/walker) No Help Needed   Using the telphone No Help Needed   Taking your medications No Help Needed   Preparing meals No Help Needed   Managing money (expenses/bills) No Help Needed   Moderately strenuous housework (laundry) No Help Needed   Shopping for personal items (toiletries/medicines) No Help Needed   Shopping for groceries No Help Needed   Driving No Help Needed   Climbing a flight of stairs No Help Needed   Getting to places beyond walking distances No Help Needed

## 2018-05-29 NOTE — MR AVS SNAPSHOT
[address] [address] [phone] Patient: Bessy Cuevas MRN: [id number] :1945 Visit Information Date & Time Provider Department Dept. Phone Encounter #  
 2018 11:00 AM Kiah White4 [facility] [address] [place] [address] [id number] Follow-up Instructions Return if symptoms worsen or fail to improve, for Keep f/u with Dr. Keshia Bullock 7/3/18. Your Appointments 7/3/2018  2:30 PM  
ROUTINE CARE with Silvina Bonilla MD  
[facility]) Appt Note: 6mth f/u;  bp.; 6mth f/u; bp $0cp 03.13.18 Kg; 6mth f/u; bp $0cp 03.13.18 Kg  
 [address] 
[place] [address] [phone]  
  
   
 [address] [place] [address] Upcoming Health Maintenance Date Due  
 GLAUCOMA SCREENING Q2Y 10/1/2012 Pneumococcal 65+ Low/Medium Risk (2 of 2 - PPSV23) 2018 MEDICARE YEARLY EXAM 2018 Influenza Age 5 to Adult 2018 BREAST CANCER SCRN MAMMOGRAM 2019 DTaP/Tdap/Td series (2 - Td) 2027 Allergies as of 2018  Review Complete On: 2018 By: Murtaza Thapa NP No Known Allergies Current Immunizations  Reviewed on 2018 No immunizations on file. Reviewed by Ryann Friedman LPN on  at 19:43 AM  
You Were Diagnosed With   
  
 Codes Comments Chronic sinusitis, unspecified location    -  Primary ICD-10-CM: J32.9 ICD-9-CM: 473.9 Vitals BP Pulse Temp Resp Height(growth percentile) Weight(growth percentile) 95/63 (BP 1 Location: Left arm, BP Patient Position: Sitting) 75 98.3 °F (36.8 °C) (Oral) 20 5' (1.524 m) 228 lb (103.4 kg) SpO2 BMI OB Status Smoking Status 93% 44.53 kg/m2 Postmenopausal Never Smoker BMI and BSA Data Body Mass Index Body Surface Area 44.53 kg/m 2 2.09 m 2 Preferred Pharmacy Pharmacy Name Phone [facility] [address] [facility] [phone] Your Updated Medication List  
  
   
This list is accurate as of 5/29/18 11:15 AM.  Always use your most recent med list.  
  
  
  
  
 amoxicillin-clavulanate 875-125 mg per tablet Commonly known as:  AUGMENTIN Take 1 Tab by mouth every twelve (12) hours for 10 days. azithromycin 250 mg tablet Commonly known as:  Rendall Tristan Take 2 tabs by mouth on day 1 and 1 tab daily on days 2-4. CORICIDIN PO Take  by mouth. fluticasone 50 mcg/actuation nasal spray Commonly known as:  Valrico Galla 2 Sprays by Both Nostrils route daily. For sinus drainage. furosemide 40 mg tablet Commonly known as:  LASIX TAKE 1/2 (ONE-HALF) TABLET BY MOUTH ONCE DAILY AS NEEDED  
  
 lisinopril 10 mg tablet Commonly known as:  Morgan Elke Take 10 mg by mouth daily. LYRICA 75 mg capsule Generic drug:  pregabalin TAKE ONE CAPSULE BY MOUTH ONCE DAILY MAX  75  MG  PER/DAY  
  
 NYQUIL PO Take  by mouth. traMADol 50 mg tablet Commonly known as:  ULTRAM  
Take 1 Tab by mouth every six (6) hours as needed for Pain. Max Daily Amount: 200 mg.  
  
 TYLENOL 325 mg tablet Generic drug:  acetaminophen Take  by mouth every four (4) hours as needed for Pain. ZyrTEC 10 mg tablet Generic drug:  cetirizine Take  by mouth. Prescriptions Sent to Pharmacy Refills  
 amoxicillin-clavulanate (AUGMENTIN) 875-125 mg per tablet 0 Sig: Take 1 Tab by mouth every twelve (12) hours for 10 days. Class: Normal  
 Pharmacy: [facility] [address] [facility], [address] [place] [address] Ph #: [phone] Route: Oral  
  
Follow-up Instructions Return if symptoms worsen or fail to improve, for Keep f/u with Dr. Terry Hobbs 7/3/18. Patient Instructions Chronic Sinusitis: Care Instructions Your Care Instructions Sinusitis is an infection of the lining of the sinus cavities in your head. It causes pain and pressure in your head and face. Sinusitis can be short-term (acute) or long-term (chronic). Chronic sinusitis lasts 12 weeks or longer. It is often caused by a bacterial or fungal infection. Other things, such as allergies, may also be involved. Chronic sinusitis may be hard to treat. It can lead to permanent changes in the mucous membranes that line the sinuses. It may make future sinus infections more likely. The infection may take some time to treat. Antibiotics are usually used if the infection is caused by bacteria. You may also need to use a corticosteroid nasal spray. If the infection is not cured after you try two or more different antibiotics, you may want to talk with your doctor about surgery or allergy testing. If the sinusitis is caused by a fungal infection, you may need to take antifungals or other medicines. You may also need surgery. Follow-up care is a key part of your treatment and safety. Be sure to make and go to all appointments, and call your doctor if you are having problems. It's also a good idea to know your test results and keep a list of the medicines you take. How can you care for yourself at home? Medicines ? · Be safe with medicines. Take your medicines exactly as prescribed. Call your doctor if you think you are having a problem with your medicine. You will get more details on the specific medicines your doctor prescribes. ? · Take your antibiotics as directed. Do not stop taking them just because you feel better. You need to take the full course of antibiotics. ? · Your doctor may recommend a corticosteroid nasal spray, wash, drops, or pills. Take this medicine exactly as prescribed. ?At home ? · Breathe warm, moist air. You can use a steamy shower, a hot bath, or a sink filled with hot water. Avoid cold, dry air.  Using a humidifier in your home may help. Follow the instructions for cleaning the machine. ? · Use saline (saltwater) nasal washes every day. This helps keep your nasal passages open. It also can wash out mucus and bacteria. ¨ You can buy saline nose drops at a grocery store or drugstore. ¨ You can make your own at home. Add 1 teaspoon of salt and 1 teaspoon of baking soda to 2 cups of distilled water. If you make your own, fill a bulb syringe with the solution. Then insert the tip into your nostril and squeeze gently. [place] Spurr your nose. ? · Put a warm, wet towel or a warm gel pack on your face 3 or 4 times a day. Leave it on 5 to 10 minutes each time. ? · Do not smoke or breathe secondhand smoke. Smoking can make sinusitis worse. If you need help quitting, talk to your doctor about stop-smoking programs and medicines. These can increase your chances of quitting for good. When should you call for help? Call your doctor now or seek immediate medical care if: 
? · You have new or worse symptoms of infection, such as: 
¨ Increased pain, swelling, warmth, or redness. ¨ Red streaks leading from the area. ¨ Pus draining from the area. ¨ A fever. ? Watch closely for changes in your health, and be sure to contact your doctor if: 
? · The mucus from your nose becomes thicker (like pus) or has new blood in it. ? · You do not get better as expected. Where can you learn more? Go to http://sarah-ingris.info/. Enter L500 in the search box to learn more about \"Chronic Sinusitis: Care Instructions. \" Current as of: May 12, 2017 Content Version: 11.4 © 7217-4503 [employer]. Care instructions adapted under license by [employer] (which disclaims liability or warranty for this information). If you have questions about a medical condition or this instruction, always ask your healthcare professional. [address] any warranty or liability for your use of this information. Saline Nasal Washes: Care Instructions Your Care Instructions Saline nasal washes help keep the nasal passages open by washing out thick or dried mucus. This simple remedy can help relieve symptoms of allergies, sinusitis, and colds. It also can make the nose feel more comfortable by keeping the mucous membranes moist. You may notice a little burning sensation in your nose the first few times you use the solution, but this usually gets better in a few days. Follow-up care is a key part of your treatment and safety. Be sure to make and go to all appointments, and call your doctor if you are having problems. It's also a good idea to know your test results and keep a list of the medicines you take. How can you care for yourself at home? · You can buy premixed saline solution in a squeeze bottle or other sinus rinse products at a drugstore. Read and follow the instructions on the label. · You also can make your own saline solution by adding 1 teaspoon of salt and 1 teaspoon of baking soda to 2 cups of distilled water. · If you use a homemade solution, pour a small amount into a clean bowl. Using a rubber bulb syringe, squeeze the syringe and place the tip in the salt water. Pull a small amount of the salt water into the syringe by relaxing your hand. · Sit down with your head tilted slightly back. Do not lie down. Put the tip of the bulb syringe or the squeeze bottle a little way into one of your nostrils. Gently drip or squirt a few drops into the nostril. Repeat with the other nostril. Some sneezing and gagging are normal at first. 
· Gently blow your nose. · Wipe the syringe or bottle tip clean after each use. · Repeat this 2 or 3 times a day. · Use nasal washes gently if you have nosebleeds often. When should you call for help? Watch closely for changes in your health, and be sure to contact your doctor if: 
? · You often get nosebleeds. ? · You have problems doing the nasal washes. Where can you learn more? Go to http://sarah-ingris.info/. Enter [id number] in the search box to learn more about \"Saline Nasal Washes: Care Instructions. \" Current as of: May 12, 2017 Content Version: 11.4 © 8479-5855 [employer]. Care instructions adapted under license by [employer] (which disclaims liability or warranty for this information). If you have questions about a medical condition or this instruction, always ask your healthcare professional. [address] any warranty or liability for your use of this information. Introducing [facility]! [facility] introduces Eptica patient portal. Now you can access parts of your medical record, email your doctor's office, and request medication refills online. 1. In your internet browser, go to [email or web address] Mutual Fund [email or web address]. [email or web address]/The Mutual Fund Store 2. Click on the First Time User? Click Here link in the Sign In box. You will see the New Member Sign Up page. 3. Enter your Eptica Access Code exactly as it appears below. You will not need to use this code after youve completed the sign-up process. If you do not sign up before the expiration date, you must request a new code. · Eptica Access Code: [email or web address] Expires: 8/15/2018 10:33 AM 
 
4. Enter the last four digits of your Social Security Number (xxxx) and Date of Birth (mm/dd/yyyy) as indicated and click Submit. You will be taken to the next sign-up page. 5. Create a [employer] ID. This will be your Eptica login ID and cannot be changed, so think of one that is secure and easy to remember. 6. Create a Eptica password. You can change your password at any time. 7. Enter your Password Reset Question and Answer. This can be used at a later time if you forget your password. 8. Enter your e-mail address. You will receive e-mail notification when new information is available in [address] 9. Click Sign Up. You can now view and download portions of your medical record. 10. Click the Download Summary menu link to download a portable copy of your medical information. If you have questions, please visit the Frequently Asked Questions section of the Wattics website. Remember, Wattics is NOT to be used for urgent needs. For medical emergencies, dial 911. Now available from your iPhone and Android! Please provide this summary of care documentation to your next provider. Your primary care clinician is listed as Star Roche. If you have any questions after today's visit, please call [phone].

## 2018-05-29 NOTE — PATIENT INSTRUCTIONS
Chronic Sinusitis: Care Instructions  Your Care Instructions    Sinusitis is an infection of the lining of the sinus cavities in your head. It causes pain and pressure in your head and face. Sinusitis can be short-term (acute) or long-term (chronic). Chronic sinusitis lasts 12 weeks or longer. It is often caused by a bacterial or fungal infection. Other things, such as allergies, may also be involved. Chronic sinusitis may be hard to treat. It can lead to permanent changes in the mucous membranes that line the sinuses. It may make future sinus infections more likely. The infection may take some time to treat. Antibiotics are usually used if the infection is caused by bacteria. You may also need to use a corticosteroid nasal spray. If the infection is not cured after you try two or more different antibiotics, you may want to talk with your doctor about surgery or allergy testing. If the sinusitis is caused by a fungal infection, you may need to take antifungals or other medicines. You may also need surgery. Follow-up care is a key part of your treatment and safety. Be sure to make and go to all appointments, and call your doctor if you are having problems. It's also a good idea to know your test results and keep a list of the medicines you take. How can you care for yourself at home? Medicines  ? · Be safe with medicines. Take your medicines exactly as prescribed. Call your doctor if you think you are having a problem with your medicine. You will get more details on the specific medicines your doctor prescribes. ? · Take your antibiotics as directed. Do not stop taking them just because you feel better. You need to take the full course of antibiotics. ? · Your doctor may recommend a corticosteroid nasal spray, wash, drops, or pills. Take this medicine exactly as prescribed. ?At home  ? · Breathe warm, moist air. You can use a steamy shower, a hot bath, or a sink filled with hot water.  Avoid cold, dry air. Using a humidifier in your home may help. Follow the instructions for cleaning the machine. ? · Use saline (saltwater) nasal washes every day. This helps keep your nasal passages open. It also can wash out mucus and bacteria. ¨ You can buy saline nose drops at a grocery store or drugstore. ¨ You can make your own at home. Add 1 teaspoon of salt and 1 teaspoon of baking soda to 2 cups of distilled water. If you make your own, fill a bulb syringe with the solution. Then insert the tip into your nostril and squeeze gently. [place] [employer] your nose. ? · Put a warm, wet towel or a warm gel pack on your face 3 or 4 times a day. Leave it on 5 to 10 minutes each time. ? · Do not smoke or breathe secondhand smoke. Smoking can make sinusitis worse. If you need help quitting, talk to your doctor about stop-smoking programs and medicines. These can increase your chances of quitting for good. When should you call for help? Call your doctor now or seek immediate medical care if:  ? · You have new or worse symptoms of infection, such as:  ¨ Increased pain, swelling, warmth, or redness. ¨ Red streaks leading from the area. ¨ Pus draining from the area. ¨ A fever. ? Watch closely for changes in your health, and be sure to contact your doctor if:  ? · The mucus from your nose becomes thicker (like pus) or has new blood in it. ? · You do not get better as expected. Where can you learn more? Go to http://sarah-ingris.info/. Enter F535 in the search box to learn more about \"Chronic Sinusitis: Care Instructions. \"  Current as of: May 12, 2017  Content Version: 11.4  © 6537-2028 [employer]. Care instructions adapted under license by [employer] (which disclaims liability or warranty for this information).  If you have questions about a medical condition or this instruction, always ask your healthcare professional. [address]  any warranty or liability for your use of this information. Saline Nasal Washes: Care Instructions  Your Care Instructions  Saline nasal washes help keep the nasal passages open by washing out thick or dried mucus. This simple remedy can help relieve symptoms of allergies, sinusitis, and colds. It also can make the nose feel more comfortable by keeping the mucous membranes moist. You may notice a little burning sensation in your nose the first few times you use the solution, but this usually gets better in a few days. Follow-up care is a key part of your treatment and safety. Be sure to make and go to all appointments, and call your doctor if you are having problems. It's also a good idea to know your test results and keep a list of the medicines you take. How can you care for yourself at home? · You can buy premixed saline solution in a squeeze bottle or other sinus rinse products at a drugstore. Read and follow the instructions on the label. · You also can make your own saline solution by adding 1 teaspoon of salt and 1 teaspoon of baking soda to 2 cups of distilled water. · If you use a homemade solution, pour a small amount into a clean bowl. Using a rubber bulb syringe, squeeze the syringe and place the tip in the salt water. Pull a small amount of the salt water into the syringe by relaxing your hand. · Sit down with your head tilted slightly back. Do not lie down. Put the tip of the bulb syringe or the squeeze bottle a little way into one of your nostrils. Gently drip or squirt a few drops into the nostril. Repeat with the other nostril. Some sneezing and gagging are normal at first.  · Gently blow your nose. · Wipe the syringe or bottle tip clean after each use. · Repeat this 2 or 3 times a day. · Use nasal washes gently if you have nosebleeds often. When should you call for help? Watch closely for changes in your health, and be sure to contact your doctor if:  ? · You often get nosebleeds.    ? · You have problems doing the nasal washes. Where can you learn more? Go to http://sarah-ingris.info/. Enter [id number] in the search box to learn more about \"Saline Nasal Washes: Care Instructions. \"  Current as of: May 12, 2017  Content Version: 11.4  © 5099-7542 [employer]. Care instructions adapted under license by [employer] (which disclaims liability or warranty for this information). If you have questions about a medical condition or this instruction, always ask your healthcare professional. [address] any warranty or liability for your use of this information.

## 2018-06-28 RX ORDER — FUROSEMIDE 40 MG/1
TABLET ORAL
Qty: 15 TAB | Refills: 1 | Status: SHIPPED | OUTPATIENT
Start: 2018-06-28 | End: 2018-08-27 | Stop reason: SDUPTHER

## 2018-07-03 ENCOUNTER — OFFICE VISIT (OUTPATIENT)
Dept: INTERNAL MEDICINE CLINIC | Facility: CLINIC | Age: 73
End: 2018-07-03

## 2018-07-03 VITALS
RESPIRATION RATE: 16 BRPM | OXYGEN SATURATION: 96 % | HEART RATE: 70 BPM | WEIGHT: 231 LBS | BODY MASS INDEX: 45.35 KG/M2 | HEIGHT: 60 IN | SYSTOLIC BLOOD PRESSURE: 110 MMHG | TEMPERATURE: 98.2 F | DIASTOLIC BLOOD PRESSURE: 65 MMHG

## 2018-07-03 DIAGNOSIS — Z00.00 MEDICARE ANNUAL WELLNESS VISIT, SUBSEQUENT: Primary | ICD-10-CM

## 2018-07-03 DIAGNOSIS — Z13.31 SCREENING FOR DEPRESSION: ICD-10-CM

## 2018-07-03 DIAGNOSIS — I10 ESSENTIAL HYPERTENSION: ICD-10-CM

## 2018-07-03 DIAGNOSIS — Z13.39 SCREENING FOR ALCOHOLISM: ICD-10-CM

## 2018-07-03 DIAGNOSIS — E66.01 OBESITY, MORBID (HCC): ICD-10-CM

## 2018-07-03 DIAGNOSIS — Z71.89 ADVANCED DIRECTIVES, COUNSELING/DISCUSSION: ICD-10-CM

## 2018-07-03 DIAGNOSIS — M79.7 FIBROMYALGIA: ICD-10-CM

## 2018-07-03 RX ORDER — PREGABALIN 75 MG/1
CAPSULE ORAL
Qty: 30 CAP | Refills: 1 | Status: SHIPPED | OUTPATIENT
Start: 2018-07-03 | End: 2020-05-22 | Stop reason: SDUPTHER

## 2018-07-03 RX ORDER — TRAMADOL HYDROCHLORIDE 50 MG/1
50 TABLET ORAL
Qty: 30 TAB | Refills: 0 | Status: SHIPPED | OUTPATIENT
Start: 2018-07-03 | End: 2018-10-18 | Stop reason: SDUPTHER

## 2018-07-03 NOTE — MR AVS SNAPSHOT
[address] [place] [address] [phone] Patient: Zabrina Escalante MRN: [id number] Visit Information Date & Time Provider Department Dept. Phone Encounter #  
 7/3/2018  2:30 PM MD [facility] [address] [id number] Follow-up Instructions Return in about 6 months (around 1/3/2019) for bp, fibromyalgia. Your Appointments 7/3/2018  2:30 PM  
ROUTINE CARE with MD Renetta Enri[facility]) Appt Note: 6mth f/u;  bp.; 6mth f/u; bp $0cp 03.13.18 Kg; 6mth f/u; bp $0cp 03.13.18 Kg  
 [address] 
[facility] [address] [phone]  
  
   
 [address] [facility] Upcoming Health Maintenance Date Due Pneumococcal 65+ Low/Medium Risk (2 of 2 - PPSV23) 2018 MEDICARE YEARLY EXAM 2018 GLAUCOMA SCREENING Q2Y 2018* Influenza Age 5 to Adult 2018 BREAST CANCER SCRN MAMMOGRAM 2019 DTaP/Tdap/Td series (2 - Td) 2027 *Topic was postponed. The date shown is not the original due date. Allergies as of 7/3/2018  Review Complete On: 7/3/2018 By: Mattie Mar MD  
 No Known Allergies Current Immunizations  Reviewed on 2018 No immunizations on file. Not reviewed this visit You Were Diagnosed With   
  
 Codes Comments Medicare annual wellness visit, subsequent    -  Primary ICD-10-CM: Z00.00 ICD-9-CM: V70.0 Fibromyalgia     ICD-10-CM: M79.7 ICD-9-CM: 729.1 Essential hypertension     ICD-10-CM: I10 
ICD-9-CM: 401.9 Obesity, morbid ([address]     ICD-10-CM: E66.01 
ICD-9-CM: 278.01 Advanced directives, counseling/discussion     ICD-10-CM: Z71.89 ICD-9-CM: V65.49 Screening for alcoholism     ICD-10-CM: Z13.89 ICD-9-CM: V79.1 Screening for depression     ICD-10-CM: Z13.89 ICD-9-CM: V79.0 Vitals BP Pulse Temp Resp Height(growth percentile) Weight(growth percentile) 110/65 (BP 1 Location: Left arm, BP Patient Position: Sitting) 70 98.2 °F (36.8 °C) (Oral) 16 5' (1.524 m) 231 lb (104.8 kg) SpO2 BMI OB Status Smoking Status 96% 45.11 kg/m2 Postmenopausal Never Smoker BMI and BSA Data Body Mass Index Body Surface Area  
 45.11 kg/m 2 2.11 m 2 Preferred Pharmacy Pharmacy Name Phone [facility] [address] [place] [phone] Your Updated Medication List  
  
   
This list is accurate as of 7/3/18  2:15 PM.  Always use your most recent med list.  
  
  
  
  
 furosemide 40 mg tablet Commonly known as:  LASIX TAKE 1/2 (ONE-HALF) TABLET BY MOUTH ONCE DAILY AS NEEDED  
  
 lisinopril 10 mg tablet Commonly known as:  Lissy Moro Take 10 mg by mouth daily. NYQUIL PO Take  by mouth as needed. pregabalin 75 mg capsule Commonly known as:  Luci Reas TAKE ONE CAPSULE BY MOUTH ONCE DAILY MAX  75  MG  PER/DAY  
  
 traMADol 50 mg tablet Commonly known as:  ULTRAM  
Take 1 Tab by mouth every six (6) hours as needed for Pain. Max Daily Amount: 200 mg.  
  
 TYLENOL 325 mg tablet Generic drug:  acetaminophen Take  by mouth every four (4) hours as needed for Pain. ZyrTEC 10 mg tablet Generic drug:  cetirizine Take  by mouth. Prescriptions Printed Refills  
 traMADol (ULTRAM) 50 mg tablet 0 Sig: Take 1 Tab by mouth every six (6) hours as needed for Pain. Max Daily Amount: 200 mg. Class: Print Route: Oral  
 pregabalin (LYRICA) 75 mg capsule 1 Sig: TAKE ONE CAPSULE BY MOUTH ONCE DAILY MAX  75  MG  PER/DAY Class: Print We Performed the Following Naye [address] [[id number] [facility]] DC ANNUAL ALCOHOL SCREEN 15 MIN [id number] [facility]] Follow-up Instructions Return in about 6 months (around 1/3/2019) for bp, fibromyalgia. Patient Instructions Medicare Wellness Visit, Female The best way to live healthy is to have a lifestyle where you eat a well-balanced diet, exercise regularly, limit alcohol use, and quit all forms of tobacco/nicotine, if applicable. Regular preventive services are another way to keep healthy. Preventive services (vaccines, screening tests, monitoring & exams) can help personalize your care plan, which helps you manage your own care. Screening tests can find health problems at the earliest stages, when they are easiest to treat. [address] follows the current, evidence-based guidelines published by the [facility] Michael Marte ([facility]) when recommending preventive services for our patients. Because we follow these guidelines, sometimes recommendations change over time as research supports it. (For example, mammograms used to be recommended annually. Even though Medicare will still pay for an annual mammogram, the newer guidelines recommend a mammogram every two years for women of average risk.) Of course, you and your provider may decide to screen more often for some diseases, based on your risk and co-morbidities (chronic disease you are already diagnosed with). Preventive services for you include: - Medicare offers their members a free annual wellness visit, which is time for you and your primary care provider to discuss and plan for your preventive service needs. Take advantage of this benefit every year! 
 
-All people over age 72 should receive the recommended pneumonia vaccines. Current USPSTF guidelines recommend a series of two vaccines for the best pneumonia protection.  
 
-All adults should have a yearly flu vaccine and a tetanus vaccine every 10 years.  All adults age 61 years should receive a shingles vaccine once in their lifetime.   
 
-A bone mass density test is recommended when a woman turns 65 to screen for osteoporosis. This test is only recommended once as a screening. Some providers will use this same test as a disease monitoring tool if you already have osteoporosis. -All adults age 38-68 years who are overweight should have a diabetes screening test once every three years.  
 
-Other screening tests & preventive services for persons with diabetes include: an eye exam to screen for diabetic retinopathy, a kidney function test, a foot exam, and stricter control over your cholesterol.  
 
-Cardiovascular screening for adults with routine risk involves an electrocardiogram (ECG) at intervals determined by the provider.  
 
-Colorectal cancer screenings should be done for adults age 54-65 years with normal risk. There are a number of acceptable methods of screening for this type of cancer. Each test has its own benefits and drawbacks. Discuss with your provider what is most appropriate for you during your annual wellness visit. The different tests include: colonoscopy (considered the best screening method), a fecal occult blood test, a fecal DNA test, and sigmoidoscopy. -Breast cancer screenings are recommended every other year for women of normal risk age 54-69 years.  
 
-Cervical cancer screenings for women over age 72 are only recommended with certain risk factors.  
 
-All adults born between [facility] should be screened once for Hepatitis C. Here is a list of your current Health Maintenance items (your personalized list of preventive services) with a due date: 
Health Maintenance Due Topic Date Due  Pneumococcal Vaccine (2 of 2 - PPSV23) 04/18/2018 [address] [place] [address] Annual Well Visit  05/12/2018 Introducing [address]! [facility] introduces [employer] patient portal. Now you can access parts of your medical record, email your doctor's office, and request medication refills online. 1. In your internet browser, go to [email or web address] 2. Click on the First Time User? Click Here link in the Sign In box. You will see the New Member Sign Up page. 3. Enter your [employer] Access Code exactly as it appears below. You will not need to use this code after youve completed the sign-up process. If you do not sign up before the expiration date, you must request a new code. · [employer] Access Code: [email or web address] Expires: 8/15/2018 10:33 AM 
 
4. Enter the last four digits of your Social Security Number (xxxx) and Date of Birth (mm/dd/yyyy) as indicated and click Submit. You will be taken to the next sign-up page. 5. Create a [employer] ID. This will be your [employer] login ID and cannot be changed, so think of one that is secure and easy to remember. 6. Create a [employer] password. You can change your password at any time. 7. Enter your Password Reset Question and Answer. This can be used at a later time if you forget your password. 8. Enter your e-mail address. You will receive e-mail notification when new information is available in [address] 9. Click Sign Up. You can now view and download portions of your medical record. 10. Click the Download Summary menu link to download a portable copy of your medical information. If you have questions, please visit the Frequently Asked Questions section of the [employer] website. Remember, [employer] is NOT to be used for urgent needs. For medical emergencies, dial 911. Now available from your iPhone and Android! Please provide this summary of care documentation to your next provider. Your primary care clinician is listed as Star Roche. If you have any questions after today's visit, please call [phone].

## 2018-07-03 NOTE — ACP (ADVANCE CARE PLANNING)
Advance Care Planning    Advance Care Planning (ACP) Provider Conversation Snapshot    Date of ACP Conversation: 07/03/18  Persons included in Conversation:  patient  Length of ACP Conversation in minutes:  <16 minutes (Non-Billable)    Authorized Decision Maker (if patient is incapable of making informed decisions): This person is:   adele Tan and Zeke Barfield          For Patients with Decision Making Capacity:   Values/Goals: Exploration of values, goals, and preferences if recovery is not expected, even with continued medical treatment in the event of:  Imminent death  Severe, permanent brain injury  \"In these circumstances, what matters most to you? \"  Care focused more on comfort or quality of life.     Conversation Outcomes / Follow-Up Plan:   Recommended completion of Advance Directive form after review of ACP materials and conversation with prospective healthcare agent

## 2018-07-03 NOTE — PROGRESS NOTES
Hugo Jones  Identified pt with two pt identifiers(name and ). Chief Complaint   Patient presents with    Blood Pressure Check     Room 2A// NON fasting     Annual Wellness Visit       1. Have you been to the ER, urgent care clinic since your last visit? Hospitalized since your last visit? NO    2. Have you seen or consulted any other health care providers outside of the [address] [place] [address] since your last visit? Include any pap smears or colon screening. NO      Provider notified of reason for visit, vitals and flowsheets obtained on patients. Patient received paperwork for advance directive during previous visit but has not completed at this time     Reviewed record In preparation for visit, huddled with provider and have obtained necessary documentation      Health Maintenance Due   Topic    GLAUCOMA SCREENING Q2Y     Pneumococcal 65+ Low/Medium Risk (2 of 2 - PPSV23)    MEDICARE YEARLY EXAM        Wt Readings from Last 3 Encounters:   18 228 lb (103.4 kg)   18 228 lb (103.4 kg)   17 234 lb 6.4 oz (106.3 kg)     Temp Readings from Last 3 Encounters:   18 98.3 °F (36.8 °C) (Oral)   18 98.3 °F (36.8 °C) (Oral)   17 97.7 °F (36.5 °C) (Oral)     BP Readings from Last 3 Encounters:   18 95/63   18 122/76   17 130/74     Pulse Readings from Last 3 Encounters:   18 75   18 61   17 75     There were no vitals filed for this visit. Learning Assessment:  :     No flowsheet data found. Depression Screening:  :     PHQ over the last two weeks 2018   Little interest or pleasure in doing things Not at all   Feeling down, depressed or hopeless Not at all   Total Score PHQ 2 0       Fall Risk Assessment:  :     Fall Risk Assessment, last 12 mths 2018   Able to walk? Yes   Fall in past 12 months? Yes   Fall with injury?  No   Number of falls in past 12 months 1   Fall Risk Score 1       Abuse Screening:  :     Abuse Screening Questionnaire 5/17/2018   Do you ever feel afraid of your partner? N   Are you in a relationship with someone who physically or mentally threatens you? N   Is it safe for you to go home? Y       ADL Screening:  :     ADL Assessment 5/17/2018   Feeding yourself No Help Needed   Getting from bed to chair No Help Needed   Getting dressed No Help Needed   Bathing or showering No Help Needed   Walk across the room (includes cane/walker) No Help Needed   Using the telphone No Help Needed   Taking your medications No Help Needed   Preparing meals No Help Needed   Managing money (expenses/bills) No Help Needed   Moderately strenuous housework (laundry) No Help Needed   Shopping for personal items (toiletries/medicines) No Help Needed   Shopping for groceries No Help Needed   Driving No Help Needed   Climbing a flight of stairs No Help Needed   Getting to places beyond walking distances No Help Needed         Medication reconciliation up to date and corrected with patient at this time.

## 2018-07-03 NOTE — PROGRESS NOTES
HPI  Ms. Jose Elias Carlos is a 81y.o. year old female, she is seen today for follow up HTN, fibromyalgia and AWV. No longer having sinus issues, no sinus pain or pressure. Is working harder, 8 hours per day as a  - 5 days per week. Busier in the summer, fibromyalgia has been worse. Has been using hemp oil which helps. Occasionally takes tramadol, getting more sleep helps her pain as well. lyrica also helps pain, takes when pain is worse. Pain is currently worse in left arm, elbow currently. Pain moves around. Ankles always hurt. No chest pain or sob, no dizziness or weakness. Lost 3# in approximately past 6 mos. Chief Complaint   Patient presents with    Blood Pressure Check     Room 2A// NON fasting     Annual Wellness Visit    Fibromyalgia     uses hemp oil PRN        Prior to Admission medications    Medication Sig Start Date End Date Taking? Authorizing Provider   traMADol (ULTRAM) 50 mg tablet Take 1 Tab by mouth every six (6) hours as needed for Pain. Max Daily Amount: 200 mg. 7/3/18  Yes Magdiel Méndez MD   pregabalin (LYRICA) 75 mg capsule TAKE ONE CAPSULE BY MOUTH ONCE DAILY MAX  75  MG  PER/DAY 7/3/18  Yes Magdiel éMndez MD   furosemide (LASIX) 40 mg tablet TAKE 1/2 (ONE-HALF) TABLET BY MOUTH ONCE DAILY AS NEEDED 6/28/18  Yes Magdiel Méndez MD   cetirizine (ZYRTEC) 10 mg tablet Take  by mouth. Yes Historical Provider   DM/p-ephed/acetaminoph/doxylam (NYQUIL PO) Take  by mouth as needed. Yes Historical Provider   lisinopril (PRINIVIL, ZESTRIL) 10 mg tablet Take 10 mg by mouth daily. 11/28/17  Yes Historical Provider   acetaminophen (TYLENOL) 325 mg tablet Take  by mouth every four (4) hours as needed for Pain.    Yes Historical Provider         No Known Allergies      REVIEW OF SYSTEMS:  Per HPI    PHYSICAL EXAM:  Visit Vitals    /65 (BP 1 Location: Left arm, BP Patient Position: Sitting)    Pulse 70    Temp 98.2 °F (36.8 °C) (Oral)    Resp 16    Ht 5' (1.524 m)    Wt 231 lb (104.8 kg)    SpO2 96%    BMI 45.11 kg/m2     Constitutional: Appears well-developed and well-nourished. No distress. HENT:   Head: Normocephalic and atraumatic. Eyes: No scleral icterus. Neck: no lad, no tm, supple   Cardiovascular: Normal S1/S2, regular rhythm. No murmurs, rubs, or gallops. Pulmonary/Chest: Effort normal and breath sounds normal. No respiratory distress. No wheezes, rhonchi, or rales. Back: no pain on palpation  Ext: No edema. Neurological: Alert. Psychiatric: Normal mood and affect. Behavior is normal.     Lab Results   Component Value Date/Time    Sodium 141 12/19/2017 04:25 PM    Potassium 4.4 12/19/2017 04:25 PM    Chloride 98 12/19/2017 04:25 PM    CO2 25 12/19/2017 04:25 PM    Anion gap 7 05/16/2010 09:05 AM    Glucose 95 12/19/2017 04:25 PM    BUN 23 12/19/2017 04:25 PM    Creatinine 0.85 12/19/2017 04:25 PM    BUN/Creatinine ratio 27 12/19/2017 04:25 PM    GFR est AA 80 12/19/2017 04:25 PM    GFR est non-AA 70 12/19/2017 04:25 PM    Calcium 9.8 12/19/2017 04:25 PM    Bilirubin, total 0.2 04/18/2017 04:24 PM    AST (SGOT) 18 04/18/2017 04:24 PM    Alk. phosphatase 98 04/18/2017 04:24 PM    Protein, total 6.7 04/18/2017 04:24 PM    Albumin 4.5 04/18/2017 04:24 PM    Globulin 3.7 05/16/2010 09:05 AM    A-G Ratio 2.0 04/18/2017 04:24 PM    ALT (SGPT) 15 04/18/2017 04:24 PM     No results found for: HBA1C, HGBE8, ZLY4KGTP, FOD6SWKG   Lab Results   Component Value Date/Time    Cholesterol, total 161 05/18/2017 11:18 AM    HDL Cholesterol 51 05/18/2017 11:18 AM    LDL, calculated 90 05/18/2017 11:18 AM    VLDL, calculated 20 05/18/2017 11:18 AM    Triglyceride 100 05/18/2017 11:18 AM          ASSESSMENT/PLAN  Diagnoses and all orders for this visit:    1. Medicare annual wellness visit, subsequent    2. Fibromyalgia  -     traMADol (ULTRAM) 50 mg tablet; Take 1 Tab by mouth every six (6) hours as needed for Pain.  Max Daily Amount: 200 mg.  -     pregabalin (LYRICA) 75 mg capsule; TAKE ONE CAPSULE BY MOUTH ONCE DAILY MAX  75  MG  PER/DAY  Uses both prn - rare use - overall well controlled without medications  3. Essential hypertension  Controlled- also will follow up with cardiology later this summer  4. Obesity, morbid ([address]  Losing weight - work on portion control  5. Advanced directives, counseling/discussion    6. Screening for alcoholism  -     Annual  Alcohol Screen 15 min ()    7. Screening for depression  -     Depression Screen Annual          Health Maintenance Due   Topic Date Due    Pneumococcal 65+ Low/Medium Risk (2 of 2 - PPSV23) 04/18/2018    MEDICARE YEARLY EXAM  05/12/2018        Follow-up Disposition:  Return in about 6 months (around 1/3/2019) for bp, fibromyalgia. Reviewed plan of care. Patient has provided input and agrees with goals. The nurse provided the patient and/or family with advanced directive information if needed and encouraged the patient to provide a copy to the office when available. This is the Subsequent Medicare Annual Wellness Exam, performed 12 months or more after the Initial AWV or the last Subsequent AWV    I have reviewed the patient's medical history in detail and updated the computerized patient record. History     Past Medical History:   Diagnosis Date    Hypertension       No past surgical history on file. Current Outpatient Prescriptions   Medication Sig Dispense Refill    traMADol (ULTRAM) 50 mg tablet Take 1 Tab by mouth every six (6) hours as needed for Pain. Max Daily Amount: 200 mg. 30 Tab 0    pregabalin (LYRICA) 75 mg capsule TAKE ONE CAPSULE BY MOUTH ONCE DAILY MAX  75  MG  PER/DAY 30 Cap 1    furosemide (LASIX) 40 mg tablet TAKE 1/2 (ONE-HALF) TABLET BY MOUTH ONCE DAILY AS NEEDED 15 Tab 1    cetirizine (ZYRTEC) 10 mg tablet Take  by mouth.  DM/p-ephed/acetaminoph/doxylam (NYQUIL PO) Take  by mouth as needed.       lisinopril (PRINIVIL, ZESTRIL) 10 mg tablet Take 10 mg by mouth daily.  acetaminophen (TYLENOL) 325 mg tablet Take  by mouth every four (4) hours as needed for Pain. 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 Functional Ability and Level of Safety:   Hearing Loss  Hearing is good. Activities of Daily Living  The home contains: no safety equipment. other than bars in bathroom  Patient does total self care    Fall Risk  Fall Risk Assessment, last 12 mths 7/3/2018   Able to walk? Yes   Fall in past 12 months? Yes   Fall with injury? No   Number of falls in past 12 months 1   Fall Risk Score 1       Abuse Screen  Patient is not abused    Cognitive Screening   Evaluation of Cognitive Function:  Has your family/caregiver stated any concerns about your memory: no  Normal    Patient Care Team   Patient Care Team:  Rena Oconnor MD as PCP - General (Internal Medicine)  Isabella Roberts MD as Physician (Nephrology)    Assessment/Plan   Education and counseling provided:  Are appropriate based on today's review and evaluation    Diagnoses and all orders for this visit:    1. Medicare annual wellness visit, subsequent    2. Fibromyalgia  -     traMADol (ULTRAM) 50 mg tablet; Take 1 Tab by mouth every six (6) hours as needed for Pain.  Max Daily Amount: 200 mg.  -     pregabalin (LYRICA) 75 mg capsule; TAKE ONE CAPSULE BY MOUTH ONCE DAILY MAX  75  MG  PER/DAY    3. Essential hypertension    4. Obesity, morbid ([address]    5. Advanced directives, counseling/discussion    6. Screening for alcoholism  -     Annual  Alcohol Screen 15 min ()    7.  Screening for depression  -     Depression Screen Annual        Health Maintenance Due   Topic Date Due    Pneumococcal 65+ Low/Medium Risk (2 of 2 - PPSV23) 04/18/2018    MEDICARE YEARLY EXAM  05/12/2018

## 2018-07-03 NOTE — PATIENT INSTRUCTIONS
Medicare Wellness Visit, Female    The best way to live healthy is to have a lifestyle where you eat a well-balanced diet, exercise regularly, limit alcohol use, and quit all forms of tobacco/nicotine, if applicable. Regular preventive services are another way to keep healthy. Preventive services (vaccines, screening tests, monitoring & exams) can help personalize your care plan, which helps you manage your own care. Screening tests can find health problems at the earliest stages, when they are easiest to treat. [address] [facility] follows the current, evidence-based guidelines published by the [facility] Michael Rosanna ([facility]) when recommending preventive services for our patients. Because we follow these guidelines, sometimes recommendations change over time as research supports it. (For example, mammograms used to be recommended annually. Even though Medicare will still pay for an annual mammogram, the newer guidelines recommend a mammogram every two years for women of average risk.)    Of course, you and your provider may decide to screen more often for some diseases, based on your risk and co-morbidities (chronic disease you are already diagnosed with). Preventive services for you include:    - Medicare offers their members a free annual wellness visit, which is time for you and your primary care provider to discuss and plan for your preventive service needs. Take advantage of this benefit every year!    -All people over age 72 should receive the recommended pneumonia vaccines. Current USPSTF guidelines recommend a series of two vaccines for the best pneumonia protection.     -All adults should have a yearly flu vaccine and a tetanus vaccine every 10 years. All adults age 61 years should receive a shingles vaccine once in their lifetime.      -A bone mass density test is recommended when a woman turns 65 to screen for osteoporosis.  This test is only recommended once as a screening. Some providers will use this same test as a disease monitoring tool if you already have osteoporosis. -All adults age 38-68 years who are overweight should have a diabetes screening test once every three years.     -Other screening tests & preventive services for persons with diabetes include: an eye exam to screen for diabetic retinopathy, a kidney function test, a foot exam, and stricter control over your cholesterol.     -Cardiovascular screening for adults with routine risk involves an electrocardiogram (ECG) at intervals determined by the provider.     -Colorectal cancer screenings should be done for adults age 54-65 years with normal risk. There are a number of acceptable methods of screening for this type of cancer. Each test has its own benefits and drawbacks. Discuss with your provider what is most appropriate for you during your annual wellness visit. The different tests include: colonoscopy (considered the best screening method), a fecal occult blood test, a fecal DNA test, and sigmoidoscopy. -Breast cancer screenings are recommended every other year for women of normal risk age 54-69 years.     -Cervical cancer screenings for women over age 72 are only recommended with certain risk factors.     -All adults born between [facility] should be screened once for Hepatitis C.      Here is a list of your current Health Maintenance items (your personalized list of preventive services) with a due date:  Health Maintenance Due   Topic Date Due    Pneumococcal Vaccine (2 of 2 - PPSV23) 04/18/2018    Annual Well Visit  05/12/2018

## 2018-08-17 ENCOUNTER — OFFICE VISIT (OUTPATIENT)
Dept: INTERNAL MEDICINE CLINIC | Facility: CLINIC | Age: 73
End: 2018-08-17

## 2018-08-17 VITALS
RESPIRATION RATE: 18 BRPM | BODY MASS INDEX: 45.23 KG/M2 | OXYGEN SATURATION: 97 % | TEMPERATURE: 98.1 F | SYSTOLIC BLOOD PRESSURE: 109 MMHG | WEIGHT: 230.4 LBS | HEART RATE: 63 BPM | DIASTOLIC BLOOD PRESSURE: 71 MMHG | HEIGHT: 60 IN

## 2018-08-17 DIAGNOSIS — M79.7 FIBROMYALGIA: Primary | ICD-10-CM

## 2018-08-17 RX ORDER — NAPROXEN 500 MG/1
500 TABLET ORAL
Qty: 30 TAB | Refills: 2 | Status: SHIPPED | OUTPATIENT
Start: 2018-08-17 | End: 2019-08-19

## 2018-08-17 NOTE — MR AVS SNAPSHOT
[address] [address] [phone] Patient: Jonathon Gutiérrez MRN: [id number] :1945 Visit Information Date & Time Provider Department Dept. Phone Encounter #  
 2018  2:00 PM Betty Sena, [address] Internal Medicine of [address] [place] [address] [id number] Follow-up Instructions Return if symptoms worsen or fail to improve, for Keep f/u with Dr. Jojo Mcclain. Your Appointments 3/5/2019  1:00 PM  
ROUTINE CARE with Bin Gutierrez MD  
[facility] Medicine of [facility]) Appt Note: 6 months (around 1/3/2019) for bp, fibromyalgia [address] [phone]  
  
   
 [address] [facility] Upcoming Health Maintenance Date Due Pneumococcal 65+ Low/Medium Risk (2 of 2 - PPSV23) 2018 Influenza Age 5 to Adult 2018 GLAUCOMA SCREENING Q2Y 2018* BREAST CANCER SCRN MAMMOGRAM 2019 MEDICARE YEARLY EXAM 2019 DTaP/Tdap/Td series (2 - Td) 2027 *Topic was postponed. The date shown is not the original due date. Allergies as of 2018  Review Complete On: 2018 By: Bharat Robert LPN No Known Allergies Current Immunizations  Reviewed on 2018 No immunizations on file. Reviewed by Bharat Robert LPN on  at  1:57 PM  
You Were Diagnosed With   
  
 Codes Comments Fibromyalgia    -  Primary ICD-10-CM: M79.7 ICD-9-CM: 729.1 Vitals BP Pulse Temp Resp Height(growth percentile) Weight(growth percentile) 109/71 (BP 1 Location: Left arm, BP Patient Position: Sitting) 63 98.1 °F (36.7 °C) (Oral) 18 5' (1.524 m) 230 lb 6.4 oz (104.5 kg) SpO2 BMI OB Status Smoking Status 97% 45 kg/m2 Postmenopausal Never Smoker Vitals History BMI and BSA Data Body Mass Index Body Surface Area  45 kg/m 2 2.1 m 2  
  
  
 Preferred Pharmacy Pharmacy Name Phone [facility] [address] [facility], [address] [phone] Your Updated Medication List  
  
   
This list is accurate as of 8/17/18  2:19 PM.  Always use your most recent med list.  
  
  
  
  
 furosemide 40 mg tablet Commonly known as:  LASIX TAKE 1/2 (ONE-HALF) TABLET BY MOUTH ONCE DAILY AS NEEDED  
  
 lisinopril 10 mg tablet Commonly known as:  [place] Take 10 mg by mouth daily. naproxen 500 mg tablet Commonly known as:  NAPROSYN Take 1 Tab by mouth daily as needed. NYQUIL PO Take  by mouth as needed. pregabalin 75 mg capsule Commonly known as:  Valerie Bard TAKE ONE CAPSULE BY MOUTH ONCE DAILY MAX  75  MG  PER/DAY  
  
 traMADol 50 mg tablet Commonly known as:  ULTRAM  
Take 1 Tab by mouth every six (6) hours as needed for Pain. Max Daily Amount: 200 mg.  
  
 TYLENOL 325 mg tablet Generic drug:  acetaminophen Take  by mouth every four (4) hours as needed for Pain. ZyrTEC 10 mg tablet Generic drug:  cetirizine Take  by mouth. Prescriptions Sent to Pharmacy Refills  
 naproxen (NAPROSYN) 500 mg tablet 2 Sig: Take 1 Tab by mouth daily as needed. Class: Normal  
 Pharmacy: [address], [address] [place] [address] Ph #: [phone] Route: Oral  
  
Follow-up Instructions Return if symptoms worsen or fail to improve, for Keep f/u with Dr. Ange Mejia. Patient Instructions Take medication only as needed. Take with food and plenty of water. Fibromyalgia: Care Instructions Your Care Instructions Fibromyalgia is a painful condition that is not completely understood by medical experts. The cause of fibromyalgia is not known. It can make you feel tired and ache all over. It causes tender spots at specific points of the body that hurt only when you press on them.  You may have trouble sleeping, as well as other symptoms. These problems can upset your work and home life. Symptoms tend to come and go, although they may never go away completely. Fibromyalgia does not harm your muscles, joints, or organs. Follow-up care is a key part of your treatment and safety. Be sure to make and go to all appointments, and call your doctor if you are having problems. It's also a good idea to know your test results and keep a list of the medicines you take. How can you care for yourself at home? · Exercise often. Walk, swim, or bike to help with pain and sleep problems and to make you feel better. · Try to get a good night's sleep. Go to bed and get up at the same time each day, whether you feel rested or not. Make sure you have a good mattress and pillow. · Reduce stress. Avoid things that cause you stress, if you can. If not, work at making them less stressful. Learn to use biofeedback, guided imagery, meditation, or other methods to relax. · Make healthy changes. Eat a balanced diet, quit smoking, and limit alcohol and caffeine. · Use a heating pad set on low or take warm baths or showers for pain. Using cold packs for up to 20 minutes at a time can also relieve pain. Put a thin cloth between the cold pack and your skin. A gentle massage might help too. · Be safe with medicines. Take your medicines exactly as prescribed. Call your doctor if you think you are having a problem with your medicine. Your doctor may talk to you about taking antidepressant medicines. These medicines may improve sleep, relieve pain, and in some cases treat depression. · Learn about fibromyalgia. This makes coping easier. Then, take an active role in your treatment. · Think about joining a support group with others who have fibromyalgia to learn more and get support. When should you call for help? Watch closely for changes in your health, and be sure to contact your doctor if:   · You feel sad, helpless, or hopeless; lose interest in things you used to enjoy; or have other symptoms of depression.  
  · Your fibromyalgia symptoms get worse. Where can you learn more? Go to http://sarah-ingris.info/. Enter V003 in the search box to learn more about \"Fibromyalgia: Care Instructions. \" Current as of: October 9, 2017 Content Version: 11.7 © 1445-4567 [employer]. Care instructions adapted under license by [employer] (which disclaims liability or warranty for this information). If you have questions about a medical condition or this instruction, always ask your healthcare professional. [address] any warranty or liability for your use of this information. Introducing [facility] HE[facility] SERVICES! Rad Shanks introduces Kopi patient portal. Now you can access parts of your medical record, email your doctor's office, and request medication refills online. 1. In your internet browser, go to [email or web address] 2. Click on the First Time User? Click Here link in the Sign In box. You will see the New Member Sign Up page. 3. Enter your Kopi Access Code exactly as it appears below. You will not need to use this code after youve completed the sign-up process. If you do not sign up before the expiration date, you must request a new code. · Kopi Access Code: [email or web address] Expires: 11/15/2018  2:19 PM 
 
4. Enter the last four digits of your Social Security Number (xxxx) and Date of Birth (mm/dd/yyyy) as indicated and click Submit. You will be taken to the next sign-up page. 5. Create a Kopi ID. This will be your Kopi login ID and cannot be changed, so think of one that is secure and easy to remember. 6. Create a Kopi password. You can change your password at any time. 7. Enter your Password Reset Question and Answer. This can be used at a later time if you forget your password. 8. Enter your e-mail address. You will receive e-mail notification when new information is available in [address] 9. Click Sign Up. You can now view and download portions of your medical record. 10. Click the Download Summary menu link to download a portable copy of your medical information. If you have questions, please visit the Frequently Asked Questions section of the Gendel website. Remember, Gendel is NOT to be used for urgent needs. For medical emergencies, dial 911. Now available from your iPhone and Android! Please provide this summary of care documentation to your next provider. Your primary care clinician is listed as Star Roche. If you have any questions after today's visit, please call [phone].

## 2018-08-17 NOTE — PROGRESS NOTES
HPI  Lorraine Fang is a 81y.o. year old female patient of Ana Emmanuel MD who presents with c/o fibromyalgia. Pt has history of has Essential hypertension, Fibromyalgia, Anisocoria, and Obesity, morbid ([address] on her problem list..    Pt saw Dr. Corazon Gillespie 1 month ago for routine f/u and was prescribed tramadol and lyrica for fibromyalgia. Pt had previously not filled tramadol since Dec 2017. Pt states the pain has been flaring up more over the summer. She is working 5 days a week. Someone at work gave her a prescription strength naproxen and she states it made the pain go away completely. Took one at 5:00pm yesterday, hasn't had pain since. Pain is in lower back mostly, sometimes 10/10. Working long hours. Only taken 2 lyrica in the past month. Didn't like side effects makes her feel forgetful. Tramadol makes her feel weird also, she can't take it past 4pm or she will be up all night. Patient Active Problem List   Diagnosis Code    Essential hypertension I10    Fibromyalgia M79.7    Anisocoria H57.02    Obesity, morbid ([address] E66.01     Past Medical History:   Diagnosis Date    Hypertension      No past surgical history on file. Social History     Social History    Marital status: SINGLE     Spouse name: N/A    Number of children: N/A    Years of education: N/A     Social History Main Topics    Smoking status: Never Smoker    Smokeless tobacco: Never Used    Alcohol use No    Drug use: No    Sexual activity: No     Other Topics Concern    Not on file     Social History Narrative     Family History   Problem Relation Age of Onset    Diabetes Mother     Heart Disease Mother     Hypertension Mother     Hypertension Father     Heart Disease Father      No Known Allergies    MEDICATIONS  Current Outpatient Prescriptions   Medication Sig    traMADol (ULTRAM) 50 mg tablet Take 1 Tab by mouth every six (6) hours as needed for Pain. Max Daily Amount: 200 mg.     pregabalin (LYRICA) 75 mg capsule TAKE ONE CAPSULE BY MOUTH ONCE DAILY MAX  75  MG  PER/DAY    furosemide (LASIX) 40 mg tablet TAKE 1/2 (ONE-HALF) TABLET BY MOUTH ONCE DAILY AS NEEDED    cetirizine (ZYRTEC) 10 mg tablet Take  by mouth.  DM/p-ephed/acetaminoph/doxylam (NYQUIL PO) Take  by mouth as needed.  lisinopril (PRINIVIL, ZESTRIL) 10 mg tablet Take 10 mg by mouth daily.  acetaminophen (TYLENOL) 325 mg tablet Take  by mouth every four (4) hours as needed for Pain. No current facility-administered medications for this visit. REVIEW OF SYSTEMS  Per HPI        Visit Vitals    /71 (BP 1 Location: Left arm, BP Patient Position: Sitting)    Pulse 63    Temp 98.1 °F (36.7 °C) (Oral)    Resp 18    Ht 5' (1.524 m)    Wt 230 lb 6.4 oz (104.5 kg)    SpO2 97%    BMI 45 kg/m2         General: Well-developed, well-nourished. In no distress. A&O x 3. Head: Normocephalic, atraumatic. Eyes: Conjunctiva clear. Back: Symmetric. ROM intact. No CVA tenderness. Pt is ttp over bilateral ower back musculature. Skin: No rashes or lesions. Musculoskeletal: Gait normal.   Psychiatric: Normal mood and affect. Behavior is normal.           ASSESSMENT and PLAN  Diagnoses and all orders for this visit:    1. Fibromyalgia  -     naproxen (NAPROSYN) 500 mg tablet; Take 1 Tab by mouth daily as needed.  -Cautioned with taking only prn to avoid damage to her GI system and kidneys- pt agrees  -Take with plenty of water and with food.  -Continue lyrica, tramadol as needed        Patient Instructions     Take medication only as needed. Take with food and plenty of water. Fibromyalgia: Care Instructions  Your Care Instructions    Fibromyalgia is a painful condition that is not completely understood by medical experts. The cause of fibromyalgia is not known. It can make you feel tired and ache all over. It causes tender spots at specific points of the body that hurt only when you press on them.  You may have trouble sleeping, as well as other symptoms. These problems can upset your work and home life. Symptoms tend to come and go, although they may never go away completely. Fibromyalgia does not harm your muscles, joints, or organs. Follow-up care is a key part of your treatment and safety. Be sure to make and go to all appointments, and call your doctor if you are having problems. It's also a good idea to know your test results and keep a list of the medicines you take. How can you care for yourself at home? · Exercise often. Walk, swim, or bike to help with pain and sleep problems and to make you feel better. · Try to get a good night's sleep. Go to bed and get up at the same time each day, whether you feel rested or not. Make sure you have a good mattress and pillow. · Reduce stress. Avoid things that cause you stress, if you can. If not, work at making them less stressful. Learn to use biofeedback, guided imagery, meditation, or other methods to relax. · Make healthy changes. Eat a balanced diet, quit smoking, and limit alcohol and caffeine. · Use a heating pad set on low or take warm baths or showers for pain. Using cold packs for up to 20 minutes at a time can also relieve pain. Put a thin cloth between the cold pack and your skin. A gentle massage might help too. · Be safe with medicines. Take your medicines exactly as prescribed. Call your doctor if you think you are having a problem with your medicine. Your doctor may talk to you about taking antidepressant medicines. These medicines may improve sleep, relieve pain, and in some cases treat depression. · Learn about fibromyalgia. This makes coping easier. Then, take an active role in your treatment. · Think about joining a support group with others who have fibromyalgia to learn more and get support. When should you call for help?   Watch closely for changes in your health, and be sure to contact your doctor if:    · You feel sad, helpless, or hopeless; lose interest in things you used to enjoy; or have other symptoms of depression.     · Your fibromyalgia symptoms get worse. Where can you learn more? Go to http://sarah-ingris.info/. Enter V003 in the search box to learn more about \"Fibromyalgia: Care Instructions. \"  Current as of: October 9, 2017  Content Version: 11.7  © 1382-9529 [employer]. Care instructions adapted under license by [employer] (which disclaims liability or warranty for this information). If you have questions about a medical condition or this instruction, always ask your healthcare professional. [address] [address] any warranty or liability for your use of this information. Please keep your follow-up appointment with Rena Oconnor MD.     Follow-up Disposition:  Return if symptoms worsen or fail to improve, for Keep f/u with Dr. Jose Tejada. Health Maintenance Due   Topic Date Due    Pneumococcal 65+ Low/Medium Risk (2 of 2 - PPSV23) 04/18/2018    Influenza Age 5 to Adult  08/01/2018       I have discussed the diagnosis with the patient and the intended plan as seen in the above orders. Patient is in agreement. The patient has received an after-visit summary and questions were answered concerning future plans. I have discussed medication side effects and warnings with the patient as well. Warning signs for the above conditions were discussed including when to call our office or go to the emergency room. The nurse provided the patient and/or family with advanced directive information if needed and encouraged the patient to provide a copy to the office when available.

## 2018-08-17 NOTE — PATIENT INSTRUCTIONS
Take medication only as needed. Take with food and plenty of water. Fibromyalgia: Care Instructions  Your Care Instructions    Fibromyalgia is a painful condition that is not completely understood by medical experts. The cause of fibromyalgia is not known. It can make you feel tired and ache all over. It causes tender spots at specific points of the body that hurt only when you press on them. You may have trouble sleeping, as well as other symptoms. These problems can upset your work and home life. Symptoms tend to come and go, although they may never go away completely. Fibromyalgia does not harm your muscles, joints, or organs. Follow-up care is a key part of your treatment and safety. Be sure to make and go to all appointments, and call your doctor if you are having problems. It's also a good idea to know your test results and keep a list of the medicines you take. How can you care for yourself at home? · Exercise often. Walk, swim, or bike to help with pain and sleep problems and to make you feel better. · Try to get a good night's sleep. Go to bed and get up at the same time each day, whether you feel rested or not. Make sure you have a good mattress and pillow. · Reduce stress. Avoid things that cause you stress, if you can. If not, work at making them less stressful. Learn to use biofeedback, guided imagery, meditation, or other methods to relax. · Make healthy changes. Eat a balanced diet, quit smoking, and limit alcohol and caffeine. · Use a heating pad set on low or take warm baths or showers for pain. Using cold packs for up to 20 minutes at a time can also relieve pain. Put a thin cloth between the cold pack and your skin. A gentle massage might help too. · Be safe with medicines. Take your medicines exactly as prescribed. Call your doctor if you think you are having a problem with your medicine. Your doctor may talk to you about taking antidepressant medicines.  These medicines may improve sleep, relieve pain, and in some cases treat depression. · Learn about fibromyalgia. This makes coping easier. Then, take an active role in your treatment. · Think about joining a support group with others who have fibromyalgia to learn more and get support. When should you call for help? Watch closely for changes in your health, and be sure to contact your doctor if:    · You feel sad, helpless, or hopeless; lose interest in things you used to enjoy; or have other symptoms of depression.     · Your fibromyalgia symptoms get worse. Where can you learn more? Go to http://sarah-ingris.info/. Enter V003 in the search box to learn more about \"Fibromyalgia: Care Instructions. \"  Current as of: October 9, 2017  Content Version: 11.7  © 2149-9705 [employer]. Care instructions adapted under license by [employer] (which disclaims liability or warranty for this information). If you have questions about a medical condition or this instruction, always ask your healthcare professional. [address] any warranty or liability for your use of this information.

## 2018-08-27 RX ORDER — FUROSEMIDE 40 MG/1
TABLET ORAL
Qty: 15 TAB | Refills: 1 | Status: SHIPPED | OUTPATIENT
Start: 2018-08-27 | End: 2019-09-23 | Stop reason: SDUPTHER

## 2018-08-29 ENCOUNTER — OFFICE VISIT (OUTPATIENT)
Dept: INTERNAL MEDICINE CLINIC | Facility: CLINIC | Age: 73
End: 2018-08-29

## 2018-08-29 ENCOUNTER — TELEPHONE (OUTPATIENT)
Dept: INTERNAL MEDICINE CLINIC | Facility: CLINIC | Age: 73
End: 2018-08-29

## 2018-08-29 VITALS
HEIGHT: 60 IN | HEART RATE: 58 BPM | RESPIRATION RATE: 18 BRPM | SYSTOLIC BLOOD PRESSURE: 125 MMHG | OXYGEN SATURATION: 98 % | BODY MASS INDEX: 44.8 KG/M2 | WEIGHT: 228.2 LBS | TEMPERATURE: 98.2 F | DIASTOLIC BLOOD PRESSURE: 74 MMHG

## 2018-08-29 DIAGNOSIS — J30.89 NON-SEASONAL ALLERGIC RHINITIS, UNSPECIFIED TRIGGER: ICD-10-CM

## 2018-08-29 DIAGNOSIS — R42 EPISODIC LIGHTHEADEDNESS: Primary | ICD-10-CM

## 2018-08-29 RX ORDER — FLUTICASONE PROPIONATE 50 MCG
2 SPRAY, SUSPENSION (ML) NASAL DAILY
Qty: 1 BOTTLE | Refills: 3 | Status: SHIPPED | OUTPATIENT
Start: 2018-08-29 | End: 2019-04-08

## 2018-08-29 RX ORDER — ACETAMINOPHEN 500 MG
TABLET ORAL 2 TIMES DAILY
COMMUNITY
End: 2019-08-09

## 2018-08-29 NOTE — MR AVS SNAPSHOT
[address] [address] [phone] Patient: Matias Strong MRN: [id number] :1945 Visit Information Date & Time Provider Department Dept. Phone Encounter #  
 2018  1:30 PM Charon Ormond, Rashmi Raymond [facility] [address] [place] [address] [id number] Follow-up Instructions Return if symptoms worsen or fail to improve. Your Appointments 3/5/2019  1:00 PM  
ROUTINE CARE with MD Christianne Shetty [facility]) Appt Note: 6 months (around 1/3/2019) for bp, fibromyalgia Leo 7 [phone]  
  
   
 [address] [facility] Upcoming Health Maintenance Date Due Pneumococcal 65+ Low/Medium Risk (2 of 2 - PPSV23) 2018 Influenza Age 5 to Adult 2018 GLAUCOMA SCREENING Q2Y 2018* BREAST CANCER SCRN MAMMOGRAM 2019 MEDICARE YEARLY EXAM 2019 DTaP/Tdap/Td series (2 - Td) 2027 *Topic was postponed. The date shown is not the original due date. Allergies as of 2018  Review Complete On: 2018 By: Ramiro Skaggs LPN No Known Allergies Current Immunizations  Reviewed on 2018 No immunizations on file. Reviewed by Ramiro Skaggs LPN on 6973 at  1:19 PM  
You Were Diagnosed With   
  
 Codes Comments Episodic lightheadedness    -  Primary ICD-10-CM: P74 ICD-9-CM: 780.4 Non-seasonal allergic rhinitis, unspecified trigger     ICD-10-CM: J30.89 ICD-9-CM: 477.8 Vitals BP Pulse Temp Resp Height(growth percentile) Weight(growth percentile) 125/74 (BP 1 Location: Left arm, BP Patient Position: Sitting) (!) 58 98.2 °F (36.8 °C) (Oral) 18 5' (1.524 m) 228 lb 3.2 oz (103.5 kg) SpO2 BMI OB Status Smoking Status 98% 44.57 kg/m2 Postmenopausal Never Smoker BMI and BSA Data Body Mass Index Body Surface Area 44.57 kg/m 2 2.09 m 2 Preferred Pharmacy Pharmacy Name Phone [facility] [place] [address] [facility] [phone] Your Updated Medication List  
  
   
This list is accurate as of 18  2:19 PM.  Always use your most recent med list.  
  
  
  
  
 Cholecalciferol (Vitamin D3) 2,000 unit Cap capsule Commonly known as:  VITAMIN D3 Take  by mouth two (2) times a day. fluticasone 50 mcg/actuation nasal spray Commonly known as:  Domnick Means 2 Sprays by Both Nostrils route daily. Indications: Allergic Rhinitis  
  
 furosemide 40 mg tablet Commonly known as:  LASIX TAKE 1/2 (ONE-HALF) TABLET BY MOUTH ONCE DAILY AS NEEDED  
  
 lisinopril 10 mg tablet Commonly known as:  Lissy Juli Take 10 mg by mouth daily. naproxen 500 mg tablet Commonly known as:  NAPROSYN Take 1 Tab by mouth daily as needed. NYQUIL PO Take  by mouth as needed. pregabalin 75 mg capsule Commonly known as:  Luci Reas TAKE ONE CAPSULE BY MOUTH ONCE DAILY MAX  75  MG  PER/DAY  
  
 traMADol 50 mg tablet Commonly known as:  ULTRAM  
Take 1 Tab by mouth every six (6) hours as needed for Pain. Max Daily Amount: 200 mg.  
  
 TYLENOL 325 mg tablet Generic drug:  acetaminophen Take  by mouth every four (4) hours as needed for Pain. ZyrTEC 10 mg tablet Generic drug:  cetirizine Take  by mouth. Prescriptions Sent to Pharmacy Refills  
 fluticasone (FLONASE) 50 mcg/actuation nasal spray 3 Si Sprays by Both Nostrils route daily. Indications: Allergic Rhinitis Class: Normal  
 Pharmacy: [address] [facility], [facility] Ph #: [phone] Route: Both Nostrils Follow-up Instructions Return if symptoms worsen or fail to improve. Patient Instructions Please drink plenty of fluids and eat at least three well balanced meals throughout the day. Try to stay indoors in the [facility] during this hot humid weather. Please take 800-1000 of Vitamin D daily. We can recheck your levels at your next routine appt with Dr. Vandana Easton. Please contact our office if your symptoms worsen or do not improve. Please call 911 or go directly to the Emergency Department if you develop shortness of breath, chest pain, difficulty breathing or worsening of your symptoms. Introducing [facility]! Juan Bertrand introduces [employer] patient portal. Now you can access parts of your medical record, email your doctor's office, and request medication refills online. 1. In your internet browser, go to [email or web address] 2. Click on the First Time User? Click Here link in the Sign In box. You will see the New Member Sign Up page. 3. Enter your [employer] Access Code exactly as it appears below. You will not need to use this code after youve completed the sign-up process. If you do not sign up before the expiration date, you must request a new code. · [employer] Access Code: [email or web address] Expires: 11/15/2018  2:19 PM 
 
4. Enter the last four digits of your Social Security Number (xxxx) and Date of Birth (mm/dd/yyyy) as indicated and click Submit. You will be taken to the next sign-up page. 5. Create a [employer] ID. This will be your [employer] login ID and cannot be changed, so think of one that is secure and easy to remember. 6. Create a [employer] password. You can change your password at any time. 7. Enter your Password Reset Question and Answer. This can be used at a later time if you forget your password. 8. Enter your e-mail address. You will receive e-mail notification when new information is available in [address] 9. Click Sign Up. You can now view and download portions of your medical record. 10. Click the Download Summary menu link to download a portable copy of your medical information. If you have questions, please visit the Frequently Asked Questions section of the [employer] website. Remember, [employer] is NOT to be used for urgent needs. For medical emergencies, dial 911. Now available from your iPhone and Android! Please provide this summary of care documentation to your next provider. Your primary care clinician is listed as Star Roche. If you have any questions after today's visit, please call [phone].

## 2018-08-29 NOTE — PATIENT INSTRUCTIONS
Please drink plenty of fluids and eat at least three well balanced meals throughout the day. Try to stay indoors in the [facility] during this hot humid weather. Please take 800-1000 of Vitamin D daily. We can recheck your levels at your next routine appt with  [facility]. Please contact our office if your symptoms worsen or do not improve. Please call 911 or go directly to the Emergency Department if you develop shortness of breath, chest pain, difficulty breathing or worsening of your symptoms.

## 2018-08-29 NOTE — TELEPHONE ENCOUNTER
Ms. Poncho Arguelles stated she has become light headed today after taking her morning medication, she went to work and it didn't get any better or worse. She just wanted to know if she needed to come in today to be seen.

## 2018-08-29 NOTE — PROGRESS NOTES
Mame Barker  Identified pt with two pt identifiers(name and ). Chief Complaint   Patient presents with    Other     lightheaded/ put self back on Vit D       Reviewed record In preparation for visit and have obtained necessary documentation. Has info on advanced directive but has not filled them out. 1. Have you been to the ER, urgent care clinic or hospitalized since your last visit? No     2. Have you seen or consulted any other health care providers outside of the [address] [place] [address] since your last visit? Include any pap smears or colon screening. No    Vitals reviewed with provider. Health Maintenance reviewed:     Health Maintenance Due   Topic    Pneumococcal 65+ Low/Medium Risk (2 of 2 - PPSV23)    Influenza Age 5 to Adult      Abuse Screening Questionnaire 7/3/2018 2018   Do you ever feel afraid of your partner? N N     Temp Readings from Last 3 Encounters:   18 98.2 °F (36.8 °C) (Oral)   18 98.1 °F (36.7 °C) (Oral)   18 98.2 °F (36.8 °C) (Oral)   clu  BP Readings from Last 3 Encounters:   18 125/74   18 109/71   18 110/65   ns Pulse Readings from Last 3 Encounters:   18 (!) 58   18 63   18 70   d. Vitals:    18 1327   BP: 125/74   Pulse: (!) 58   Resp: 18   Temp: 98.2 °F (36.8 °C)   TempSrc: Oral   SpO2: 98%   Weight: 228 lb 3.2 oz (103.5 kg)   Height: 5' (1.524 m)   PainSc:   6   PainLoc: Ankle   oiletries/medicines) No Help Ne  PHQ over the last two weeks 7/3/2018   Little interest or pleasure in doing things Not at all   Feeling down, depressed, irritable, or hopeless Not at all   Total Score PHQ 2 0   2018     N     N     Y     Fall Risk Assessment, last 12 mths 7/3/2018   Able to walk? Yes   Fall in past 12 months? Yes   Fall with injury?  No   Number of falls in past 12 months 1   Fall Risk Score 1

## 2018-08-29 NOTE — PROGRESS NOTES
MCKINLEY Foreman is a 81y.o. year old female patient of Jacque Salomon MD who presents with c/o lightheadedness. Pt has history of has Essential hypertension, Fibromyalgia, Anisocoria, and Obesity, morbid ([address] on her problem list..    Saturday and Sunday had lots of head sweating. Thinks from low vitamin D so took two OTC vitamin D. Woke up this AM and felt very lightheaded. Not sure if it's her sinuses. Doesn't feel like room is spinning. Went to work today and they sent her home. Seeing black spots like with ocular migraine, states hx of migraines long time ago. Took her flonase, hasn't been using lately. Says she has a tendency to hold her breath. Denies SOB, cough, cp, or palpitations. Denies f/c. Takes her lasix everyday. Has been watching her salt. Has been drinking water. Hasn't been eating much during the day, busy with work and gets too hot. Denies N/V/D. Denies HA. Denies blurred vision or double vision. Has a hx of vertigo 8 years ago. Pt was seen by me 1 week ago for fibromyalgia treated with naproxen. She also follows with Cardiology. Patient Active Problem List   Diagnosis Code    Essential hypertension I10    Fibromyalgia M79.7    Anisocoria H57.02    Obesity, morbid ([address] E66.01     Past Medical History:   Diagnosis Date    Hypertension      No past surgical history on file.   Social History     Social History    Marital status: SINGLE     Spouse name: N/A    Number of children: N/A    Years of education: N/A     Social History Main Topics    Smoking status: Never Smoker    Smokeless tobacco: Never Used    Alcohol use No    Drug use: No    Sexual activity: No     Other Topics Concern    None     Social History Narrative     Family History   Problem Relation Age of Onset    Diabetes Mother     Heart Disease Mother     Hypertension Mother     Hypertension Father     Heart Disease Father      No Known Allergies    MEDICATIONS  Current Outpatient Prescriptions Medication Sig    Cholecalciferol, Vitamin D3, (VITAMIN D3) 2,000 unit cap capsule Take  by mouth two (2) times a day.  furosemide (LASIX) 40 mg tablet TAKE 1/2 (ONE-HALF) TABLET BY MOUTH ONCE DAILY AS NEEDED    naproxen (NAPROSYN) 500 mg tablet Take 1 Tab by mouth daily as needed.  traMADol (ULTRAM) 50 mg tablet Take 1 Tab by mouth every six (6) hours as needed for Pain. Max Daily Amount: 200 mg.  pregabalin (LYRICA) 75 mg capsule TAKE ONE CAPSULE BY MOUTH ONCE DAILY MAX  75  MG  PER/DAY    cetirizine (ZYRTEC) 10 mg tablet Take  by mouth.  DM/p-ephed/acetaminoph/doxylam (NYQUIL PO) Take  by mouth as needed.  lisinopril (PRINIVIL, ZESTRIL) 10 mg tablet Take 10 mg by mouth daily.  acetaminophen (TYLENOL) 325 mg tablet Take  by mouth every four (4) hours as needed for Pain. No current facility-administered medications for this visit. REVIEW OF SYSTEMS  Per HPI        Visit Vitals    /74 (BP 1 Location: Left arm, BP Patient Position: Sitting)    Pulse (!) 58    Temp 98.2 °F (36.8 °C) (Oral)    Resp 18    Ht 5' (1.524 m)    Wt 228 lb 3.2 oz (103.5 kg)    SpO2 98%    BMI 44.57 kg/m2         General: Well-developed, well-nourished. In no distress. A&O x 3. Head: Normocephalic, atraumatic. Eyes: Conjunctiva clear. Pupils equal, round, reactive to light. Extraocular movements intact. Ears/Nose: TM's and ear canals normal bilaterally. Nares normal. Septum midline. Normal nasal mucosa. No drainage or sinus tenderness. Mouth/Throat: Lips, mucosa, and tongue normal. Oropharynx benign. Neck: Supple, symmetrical, trachea midline, no lymphadenopathy, no carotid bruits, no JVD, thyroid: not enlarged, symmetric, no tenderness/mass/nodules. Lungs: Clear to auscultation bilaterally. No crackles or wheezes. No use of accessory muscles. Speaks in full sentences without SOB. Chest Wall: No tenderness or deformity.   Heart: RRR, normal S1 and S2, no murmur, click, rub, or gallop. Skin: No rashes or lesions. Neurovasc: No edema appreciated. Musculoskeletal: Gait normal.   Psychiatric: Normal mood and affect. Behavior is normal.     Lab Results   Component Value Date/Time    WBC 6.9 05/16/2010 09:05 AM    HGB 12.0 05/16/2010 09:05 AM    HCT 38.1 05/16/2010 09:05 AM    PLATELET 380 99/50/7601 09:05 AM    MCV 88.2 05/16/2010 09:05 AM     Lab Results   Component Value Date/Time    Sodium 141 12/19/2017 04:25 PM    Potassium 4.4 12/19/2017 04:25 PM    Chloride 98 12/19/2017 04:25 PM    CO2 25 12/19/2017 04:25 PM    Anion gap 7 05/16/2010 09:05 AM    Glucose 95 12/19/2017 04:25 PM    BUN 23 12/19/2017 04:25 PM    Creatinine 0.85 12/19/2017 04:25 PM    BUN/Creatinine ratio 27 12/19/2017 04:25 PM    GFR est AA 80 12/19/2017 04:25 PM    GFR est non-AA 70 12/19/2017 04:25 PM    Calcium 9.8 12/19/2017 04:25 PM    Bilirubin, total 0.2 04/18/2017 04:24 PM    AST (SGOT) 18 04/18/2017 04:24 PM    Alk. phosphatase 98 04/18/2017 04:24 PM    Protein, total 6.7 04/18/2017 04:24 PM    Albumin 4.5 04/18/2017 04:24 PM    Globulin 3.7 05/16/2010 09:05 AM    A-G Ratio 2.0 04/18/2017 04:24 PM    ALT (SGPT) 15 04/18/2017 04:24 PM         ASSESSMENT and PLAN  Diagnoses and all orders for this visit:    1. Episodic lightheadedness  -suspect r/t combination of not eating, watching her diet, and increased heat and humidity  -encouraged pt to drink plenty of fluids, eat 3 balanced meals daily, neetu before work in AM, drink fluids while at work  -vital signs stable, pt has hx of bradycardia- continue to monitor at home    2. Non-seasonal allergic rhinitis, unspecified trigger  -     fluticasone (FLONASE) 50 mcg/actuation nasal spray; 2 Sprays by Both Nostrils route daily. Indications: Allergic Rhinitis          Patient Instructions   Please drink plenty of fluids and eat at least three well balanced meals throughout the day. Try to stay indoors in the [facility] during this hot humid weather.     Please take 800-1000 of Vitamin D daily. We can recheck your levels at your next routine appt with Dr. Maicol Lucia. Please contact our office if your symptoms worsen or do not improve. Please call 911 or go directly to the Emergency Department if you develop shortness of breath, chest pain, difficulty breathing or worsening of your symptoms. Please keep your follow-up appointment with Haydee Robles MD.     Follow-up Disposition:  Return if symptoms worsen or fail to improve. Health Maintenance Due   Topic Date Due    Pneumococcal 65+ Low/Medium Risk (2 of 2 - PPSV23) 04/18/2018    Influenza Age 5 to Adult  08/01/2018       I have discussed the diagnosis with the patient and the intended plan as seen in the above orders. Patient is in agreement. The patient has received an after-visit summary and questions were answered concerning future plans. I have discussed medication side effects and warnings with the patient as well. Warning signs for the above conditions were discussed including when to call our office or go to the emergency room. The nurse provided the patient and/or family with advanced directive information if needed and encouraged the patient to provide a copy to the office when available.

## 2018-10-18 ENCOUNTER — OFFICE VISIT (OUTPATIENT)
Dept: INTERNAL MEDICINE CLINIC | Facility: CLINIC | Age: 73
End: 2018-10-18

## 2018-10-18 VITALS
OXYGEN SATURATION: 96 % | SYSTOLIC BLOOD PRESSURE: 144 MMHG | HEART RATE: 77 BPM | BODY MASS INDEX: 45.94 KG/M2 | RESPIRATION RATE: 18 BRPM | HEIGHT: 60 IN | WEIGHT: 234 LBS | DIASTOLIC BLOOD PRESSURE: 70 MMHG | TEMPERATURE: 98.1 F

## 2018-10-18 DIAGNOSIS — M25.531 ACUTE PAIN OF RIGHT WRIST: Primary | ICD-10-CM

## 2018-10-18 DIAGNOSIS — M79.7 FIBROMYALGIA: ICD-10-CM

## 2018-10-18 NOTE — PATIENT INSTRUCTIONS
We will call you with your xray results. You can find this brace at [facility], [facility], [facility], etc.      Wrist Sprain: Care Instructions  Your Care Instructions    Your wrist hurts because you have stretched or torn ligaments, which connect the bones in your wrist.  Wrist sprains usually take from 2 to 10 weeks to heal, but some take longer. Usually, the more pain you have, the more severe your wrist sprain is and the longer it will take to heal. You can heal faster and regain strength in your wrist with good home treatment. Follow-up care is a key part of your treatment and safety. Be sure to make and go to all appointments, and call your doctor if you are having problems. It's also a good idea to know your test results and keep a list of the medicines you take. How can you care for yourself at home? · Prop up your arm on a pillow when you ice it or anytime you sit or lie down for the next 3 days. Try to keep your wrist above the level of your heart. This will help reduce swelling. · Put ice or cold packs on your wrist for 10 to 20 minutes at a time. Try to do this every 1 to 2 hours for the next 3 days (when you are awake) or until the swelling goes down. Put a thin cloth between the ice pack and your skin. · After 2 or 3 days, if your swelling is gone, apply a heating pad set on low or a warm cloth to your wrist. This helps keep your wrist flexible. Some doctors suggest that you go back and forth between hot and cold. · If you have an elastic bandage, keep it on for the next 24 to 36 hours. The bandage should be snug but not so tight that it causes numbness or tingling. To rewrap the wrist, wrap the bandage around the hand a few times, beginning at the fingers. Then wrap it around the hand between the thumb and index finger, ending by circling the wrist several times. · If your doctor gave you a splint or brace, wear it as directed to protect your wrist until it has healed.   · Take pain medicines exactly as directed. ? If the doctor gave you a prescription medicine for pain, take it as prescribed. ? If you are not taking a prescription pain medicine, ask your doctor if you can take an over-the-counter medicine. · Try not to use your injured wrist and hand. When should you call for help? Call your doctor now or seek immediate medical care if:    · Your hand or fingers are cool or pale or change color.    Watch closely for changes in your health, and be sure to contact your doctor if:    · Your pain gets worse.     · Your wrist has not improved after 1 week. Where can you learn more? Go to http://sarah-ingris.info/. Enter G541 in the search box to learn more about \"Wrist Sprain: Care Instructions. \"  Current as of: November 29, 2017  Content Version: 11.8  © 2419-1319 [employer]. Care instructions adapted under license by [employer] (which disclaims liability or warranty for this information). If you have questions about a medical condition or this instruction, always ask your healthcare professional. [address] [address] any warranty or liability for your use of this information. Wrist Sprain: Rehab Exercises  Your Care Instructions  Here are some examples of typical rehabilitation exercises for your condition. Start each exercise slowly. Ease off the exercise if you start to have pain. Your doctor or your physical or occupational therapist will tell you when you can start these exercises and which ones will work best for you. How to do the exercises  Resisted wrist extension    1. Sit leaning forward with your legs slightly spread. Then place your forearm on your thigh with your affected hand and wrist in front of your knee. 2. Grasp one end of an exercise band with your palm down. Step on the other end.  3. Slowly bend your wrist upward for a count of 2. Then lower your wrist slowly to a count of 5.  4. Repeat 8 to 12 times.     Resisted wrist flexion    1. Sit leaning forward with your legs slightly spread. Then place your forearm on your thigh with your affected hand and wrist in front of your knee. 2. Grasp one end of an exercise band with your palm up. Step on the other end.  3. Slowly bend your wrist upward for a count of 2. Then lower your wrist slowly to a count of 5.  4. Repeat 8 to 12 times. Resisted radial deviation    1. Sit leaning forward with your legs slightly spread. Then place your forearm on your thigh with your affected hand and wrist in front of your knee. 2. Grasp one end of an exercise band with your hand facing toward your other thigh. Step on the other end.  3. Slowly bend your wrist upward for a count of 2. Then lower your wrist slowly to a count of 5.  4. Repeat 8 to 12 times. Resisted ulnar deviation    1. Sit leaning forward with your legs slightly spread. Then place your forearm on your thigh with your affected hand and wrist by the inside of your knee. 2. Grasp one end of an exercise band with your palm down. Step on the other end with the foot opposite the hand holding the band. 3. Slowly bend your wrist outward and toward your knee for a count of 2. Then slowly move your wrist back to the starting position to a count of 5.  4. Repeat 8 to 12 times. Resisted forearm pronation    1. Sit leaning forward with your legs slightly spread. Then place your forearm on your thigh with your affected hand and wrist in front of your knee. 2. Grasp one end of an exercise band with your palm up. Step on the other end. 3. Keeping your wrist straight, roll your palm inward toward your thigh for a count of 2. Then slowly move your wrist back to the starting position to a count of 5.  4. Repeat 8 to 12 times. Resisted supination    1. Sit leaning forward with your legs slightly spread. Then place your forearm on your thigh with your affected hand and wrist in front of your knee.   2. Grasp one end of an exercise band with your palm down. Step on the other end. 3. Keeping your wrist straight, roll your palm outward and away from your thigh for a count of 2. Then slowly move your wrist back to the starting position to a count of 5.  4. Repeat 8 to 12 times. Follow-up care is a key part of your treatment and safety. Be sure to make and go to all appointments, and call your doctor if you are having problems. It's also a good idea to know your test results and keep a list of the medicines you take. Where can you learn more? Go to http://sarah-ingris.info/. Enter S110 in the search box to learn more about \"Wrist Sprain: Rehab Exercises. \"  Current as of: November 29, 2017  Content Version: 11.8  © 6523-8928 [employer]. Care instructions adapted under license by [employer] (which disclaims liability or warranty for this information). If you have questions about a medical condition or this instruction, always ask your healthcare professional. [address] any warranty or liability for your use of this information.

## 2018-10-18 NOTE — PROGRESS NOTES
MCKINLEY Guillermo is a 72y.o. year old female patient of Jeanna Sandoval MD who presents with c/o wrist pain. Pt has history of has Essential hypertension, Fibromyalgia, Anisocoria, and Obesity, morbid ([address] on their problem list..    Pt c/o right wrist pain x 1 hour. 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 Has noticed swelling at base of wrist. Hurts to pick something up. Unable to life the frying pan at work. Doesn't hurt to make a fist. Denies numbness in fingers. Hasn't taken anything for pain. States she has naproxen and tramadol at home if she needs it. Pain was 10/10 when it happened, now is 6/10. Pt is right-handed. Patient Active Problem List   Diagnosis Code    Essential hypertension I10    Fibromyalgia M79.7    Anisocoria H57.02    Obesity, morbid ([address] E66.01     Past Medical History:   Diagnosis Date    Hypertension      No past surgical history on file.   Social History     Socioeconomic History    Marital status: SINGLE     Spouse name: Not on file    Number of children: Not on file    Years of education: Not on file    Highest education level: Not on file   Social Needs    Financial resource strain: Not on file    Food insecurity - worry: Not on file    Food insecurity - inability: Not on file    Transportation needs - medical: Not on file   [employer] needs - non-medical: Not on file   Occupational History    Not on file   Tobacco Use    Smoking status: Never Smoker    Smokeless tobacco: Never Used   Substance and Sexual Activity    Alcohol use: No    Drug use: No    Sexual activity: No   Other Topics Concern    Not on file   Social History Narrative    Not on file     Family History   Problem Relation Age of Onset    Diabetes Mother     Heart Disease Mother     Hypertension Mother     Hypertension Father     Heart Disease Father      No Known Allergies    MEDICATIONS  Current Outpatient Medications   Medication Sig    Cholecalciferol, Vitamin D3, (VITAMIN D3) 2,000 unit cap capsule Take  by mouth two (2) times a day.  fluticasone (FLONASE) 50 mcg/actuation nasal spray 2 Sprays by Both Nostrils route daily. Indications: Allergic Rhinitis    furosemide (LASIX) 40 mg tablet TAKE 1/2 (ONE-HALF) TABLET BY MOUTH ONCE DAILY AS NEEDED    naproxen (NAPROSYN) 500 mg tablet Take 1 Tab by mouth daily as needed.  traMADol (ULTRAM) 50 mg tablet Take 1 Tab by mouth every six (6) hours as needed for Pain. Max Daily Amount: 200 mg.  pregabalin (LYRICA) 75 mg capsule TAKE ONE CAPSULE BY MOUTH ONCE DAILY MAX  75  MG  PER/DAY    cetirizine (ZYRTEC) 10 mg tablet Take  by mouth.  DM/p-ephed/acetaminoph/doxylam (NYQUIL PO) Take  by mouth as needed.  lisinopril (PRINIVIL, ZESTRIL) 10 mg tablet Take 10 mg by mouth daily.  acetaminophen (TYLENOL) 325 mg tablet Take  by mouth every four (4) hours as needed for Pain. No current facility-administered medications for this visit. REVIEW OF SYSTEMS  Per HPI        Visit Vitals  /70 (BP 1 Location: Left arm, BP Patient Position: Sitting)   Pulse 77   Temp 98.1 °F (36.7 °C) (Oral)   Resp 18   Ht 5' (1.524 m)   Wt 234 lb (106.1 kg)   SpO2 96%   BMI 45.70 kg/m²         General: Well-developed, well-nourished. In no distress. A&O x 3. Head: Normocephalic, atraumatic. Eyes: Conjunctiva clear. Skin: No rashes or lesions. Neurological: CN II-XII intact. Equal  strength. Musculoskeletal: Gait normal. Right wrist ROM normal. Right wrist- + mild edema over the distal radius, palmar side- area is ttp, possible ganglion cyst.   Psychiatric: Normal mood and affect. Behavior is normal.         ASSESSMENT and PLAN  Diagnoses and all orders for this visit:    Acute pain of right wrist  -     XR WRIST RT AP/LAT;  Future  -suspect wrist strain based on mechanism of injury   -recommended wearing splint as show below  -tylenol prn pain  -ice when get home and for next 48hrs (20 mins at a time)        Patient Instructions     We will call you with your xray results. Wrist Sprain: Care Instructions  Your Care Instructions    Your wrist hurts because you have stretched or torn ligaments, which connect the bones in your wrist.  Wrist sprains usually take from 2 to 10 weeks to heal, but some take longer. Usually, the more pain you have, the more severe your wrist sprain is and the longer it will take to heal. You can heal faster and regain strength in your wrist with good home treatment. Follow-up care is a key part of your treatment and safety. Be sure to make and go to all appointments, and call your doctor if you are having problems. It's also a good idea to know your test results and keep a list of the medicines you take. How can you care for yourself at home? · Prop up your arm on a pillow when you ice it or anytime you sit or lie down for the next 3 days. Try to keep your wrist above the level of your heart. This will help reduce swelling. · Put ice or cold packs on your wrist for 10 to 20 minutes at a time. Try to do this every 1 to 2 hours for the next 3 days (when you are awake) or until the swelling goes down. Put a thin cloth between the ice pack and your skin. · After 2 or 3 days, if your swelling is gone, apply a heating pad set on low or a warm cloth to your wrist. This helps keep your wrist flexible. Some doctors suggest that you go back and forth between hot and cold. · If you have an elastic bandage, keep it on for the next 24 to 36 hours. The bandage should be snug but not so tight that it causes numbness or tingling. To rewrap the wrist, wrap the bandage around the hand a few times, beginning at the fingers. Then wrap it around the hand between the thumb and index finger, ending by circling the wrist several times.   · If your doctor gave you a splint or brace, wear it as directed to protect your wrist until it has healed. · Take pain medicines exactly as directed. ? If the doctor gave you a prescription medicine for pain, take it as prescribed. ? If you are not taking a prescription pain medicine, ask your doctor if you can take an over-the-counter medicine. · Try not to use your injured wrist and hand. When should you call for help? Call your doctor now or seek immediate medical care if:    · Your hand or fingers are cool or pale or change color.    Watch closely for changes in your health, and be sure to contact your doctor if:    · Your pain gets worse.     · Your wrist has not improved after 1 week. Where can you learn more? Go to http://sarah-ingris.info/. Enter G541 in the search box to learn more about \"Wrist Sprain: Care Instructions. \"  Current as of: November 29, 2017  Content Version: 11.8  © 2396-0314 [employer]. Care instructions adapted under license by [employer] (which disclaims liability or warranty for this information). If you have questions about a medical condition or this instruction, always ask your healthcare professional. [address] any warranty or liability for your use of this information. Wrist Sprain: Rehab Exercises  Your Care Instructions  Here are some examples of typical rehabilitation exercises for your condition. Start each exercise slowly. Ease off the exercise if you start to have pain. Your doctor or your physical or occupational therapist will tell you when you can start these exercises and which ones will work best for you. How to do the exercises  Resisted wrist extension    1. Sit leaning forward with your legs slightly spread. Then place your forearm on your thigh with your affected hand and wrist in front of your knee. 2. Grasp one end of an exercise band with your palm down. Step on the other end.  3. Slowly bend your wrist upward for a count of 2.  Then lower your wrist slowly to a count of 5.  4. Repeat 8 to 12 times. Resisted wrist flexion    1. Sit leaning forward with your legs slightly spread. Then place your forearm on your thigh with your affected hand and wrist in front of your knee. 2. Grasp one end of an exercise band with your palm up. Step on the other end.  3. Slowly bend your wrist upward for a count of 2. Then lower your wrist slowly to a count of 5.  4. Repeat 8 to 12 times. Resisted radial deviation    1. Sit leaning forward with your legs slightly spread. Then place your forearm on your thigh with your affected hand and wrist in front of your knee. 2. Grasp one end of an exercise band with your hand facing toward your other thigh. Step on the other end.  3. Slowly bend your wrist upward for a count of 2. Then lower your wrist slowly to a count of 5.  4. Repeat 8 to 12 times. Resisted ulnar deviation    1. Sit leaning forward with your legs slightly spread. Then place your forearm on your thigh with your affected hand and wrist by the inside of your knee. 2. Grasp one end of an exercise band with your palm down. Step on the other end with the foot opposite the hand holding the band. 3. Slowly bend your wrist outward and toward your knee for a count of 2. Then slowly move your wrist back to the starting position to a count of 5.  4. Repeat 8 to 12 times. Resisted forearm pronation    1. Sit leaning forward with your legs slightly spread. Then place your forearm on your thigh with your affected hand and wrist in front of your knee. 2. Grasp one end of an exercise band with your palm up. Step on the other end. 3. Keeping your wrist straight, roll your palm inward toward your thigh for a count of 2. Then slowly move your wrist back to the starting position to a count of 5.  4. Repeat 8 to 12 times. Resisted supination    1. Sit leaning forward with your legs slightly spread.  Then place your forearm on your thigh with your affected hand and wrist in front of your knee. 2. Grasp one end of an exercise band with your palm down. Step on the other end. 3. Keeping your wrist straight, roll your palm outward and away from your thigh for a count of 2. Then slowly move your wrist back to the starting position to a count of 5.  4. Repeat 8 to 12 times. Follow-up care is a key part of your treatment and safety. Be sure to make and go to all appointments, and call your doctor if you are having problems. It's also a good idea to know your test results and keep a list of the medicines you take. Where can you learn more? Go to http://sarah-ingris.info/. Enter S110 in the search box to learn more about \"Wrist Sprain: Rehab Exercises. \"  Current as of: November 29, 2017  Content Version: 11.8  © 7772-0033 [employer]. Care instructions adapted under license by [employer] (which disclaims liability or warranty for this information). If you have questions about a medical condition or this instruction, always ask your healthcare professional. [place] [address] any warranty or liability for your use of this information. Please keep your follow-up appointment with Tone Sam MD.     Follow-up Disposition:  Return if symptoms worsen or fail to improve. Health Maintenance Due   Topic Date Due    Shingrix Vaccine Age 49> (1 of 2) 10/01/1997    GLAUCOMA SCREENING Q2Y  10/01/2012    Pneumococcal 65+ Low/Medium Risk (2 of 2 - PPSV23) 04/18/2018    Influenza Age 5 to Adult  08/01/2018       I have discussed the diagnosis with the patient and the intended plan as seen in the above orders. Patient is in agreement. The patient has received an after-visit summary and questions were answered concerning future plans. I have discussed medication side effects and warnings with the patient as well.  Warning signs for the above conditions were discussed including when to call our office or go to the emergency room. The nurse provided the patient and/or family with advanced directive information if needed and encouraged the patient to provide a copy to the office when available.

## 2018-10-18 NOTE — PROGRESS NOTES
Pelon Hansel  Identified pt with two pt identifiers(name and ). Chief Complaint   Patient presents with    Wrist Pain     started a hour ago        Reviewed record In preparation for visit and have obtained necessary documentation. Has info on advanced directive but has not filled them out. 1. Have you been to the ER, urgent care clinic or hospitalized since your last visit? No     2. Have you seen or consulted any other health care providers outside of the [address] [place] [address] since your last visit? Include any pap smears or colon screening. No    Vitals reviewed with provider. Health Maintenance reviewed:     Health Maintenance Due   Topic    Shingrix Vaccine Age 50> (1 of 2)    GLAUCOMA SCREENING Q2Y     Pneumococcal 65+ Low/Medium Risk (2 of 2 - PPSV23)    Influenza Age 5 to Adult           Wt Readings from Last 3 Encounters:   18 228 lb 3.2 oz (103.5 kg)   18 230 lb 6.4 oz (104.5 kg)   18 231 lb (104.8 kg)        Temp Readings from Last 3 Encounters:   18 98.2 °F (36.8 °C) (Oral)   18 98.1 °F (36.7 °C) (Oral)   18 98.2 °F (36.8 °C) (Oral)        BP Readings from Last 3 Encounters:   18 125/74   18 109/71   18 110/65        Pulse Readings from Last 3 Encounters:   18 (!) 58   18 63   18 70      There were no vitals filed for this visit. Learning Assessment:   :     No flowsheet data found. Depression Screening:   :       PHQ over the last two weeks 7/3/2018   Little interest or pleasure in doing things Not at all   Feeling down, depressed, irritable, or hopeless Not at all   Total Score PHQ 2 0        Fall Risk Assessment:   :       Fall Risk Assessment, last 12 mths 7/3/2018   Able to walk? Yes   Fall in past 12 months? Yes   Fall with injury?  No   Number of falls in past 12 months 1   Fall Risk Score 1        Abuse Screening:   :       Abuse Screening Questionnaire 7/3/2018 2018   Do you ever feel afraid of your partner? N N   Are you in a relationship with someone who physically or mentally threatens you? N N   Is it safe for you to go home?  Y Y        ADL Screening:   :       ADL Assessment 7/3/2018   Feeding yourself No Help Needed   Getting from bed to chair No Help Needed   Getting dressed No Help Needed   Bathing or showering No Help Needed   Walk across the room (includes cane/walker) No Help Needed   Using the telphone No Help Needed   Taking your medications No Help Needed   Preparing meals No Help Needed   Managing money (expenses/bills) No Help Needed   Moderately strenuous housework (laundry) No Help Needed   Shopping for personal items (toiletries/medicines) No Help Needed   Shopping for groceries No Help Needed   Driving No Help Needed   Climbing a flight of stairs No Help Needed   Getting to places beyond walking distances No Help Needed

## 2018-10-19 RX ORDER — TRAMADOL HYDROCHLORIDE 50 MG/1
50 TABLET ORAL
Qty: 30 TAB | Refills: 0 | OUTPATIENT
Start: 2018-10-19 | End: 2018-12-26 | Stop reason: SDUPTHER

## 2018-12-26 DIAGNOSIS — M79.7 FIBROMYALGIA: ICD-10-CM

## 2018-12-26 RX ORDER — TRAMADOL HYDROCHLORIDE 50 MG/1
TABLET ORAL
Qty: 30 TAB | Refills: 0 | Status: SHIPPED | OUTPATIENT
Start: 2018-12-26 | End: 2019-06-28 | Stop reason: SDUPTHER

## 2018-12-26 NOTE — TELEPHONE ENCOUNTER
Because more than one week of tramadol  prescribed per year by law must get controlled substance contract and urine drug screen for refill. Will sign rx but not to  without above.

## 2018-12-28 ENCOUNTER — TELEPHONE (OUTPATIENT)
Dept: INTERNAL MEDICINE CLINIC | Facility: CLINIC | Age: 73
End: 2018-12-28

## 2018-12-28 DIAGNOSIS — Z79.899 HIGH RISK MEDICATION USE: Primary | ICD-10-CM

## 2019-01-02 LAB — DRUGS UR: NORMAL

## 2019-04-08 ENCOUNTER — OFFICE VISIT (OUTPATIENT)
Dept: INTERNAL MEDICINE CLINIC | Facility: CLINIC | Age: 74
End: 2019-04-08

## 2019-04-08 VITALS
BODY MASS INDEX: 46.68 KG/M2 | HEIGHT: 60 IN | RESPIRATION RATE: 18 BRPM | SYSTOLIC BLOOD PRESSURE: 125 MMHG | OXYGEN SATURATION: 94 % | TEMPERATURE: 98.3 F | DIASTOLIC BLOOD PRESSURE: 68 MMHG | WEIGHT: 237.8 LBS | HEART RATE: 72 BPM

## 2019-04-08 DIAGNOSIS — M54.50 ACUTE RIGHT-SIDED LOW BACK PAIN WITHOUT SCIATICA: Primary | ICD-10-CM

## 2019-04-08 DIAGNOSIS — M79.7 FIBROMYALGIA: ICD-10-CM

## 2019-04-08 RX ORDER — NAPROXEN SODIUM 220 MG
220 TABLET ORAL AS NEEDED
COMMUNITY
End: 2019-08-19

## 2019-04-08 RX ORDER — KETOROLAC TROMETHAMINE 30 MG/ML
60 INJECTION, SOLUTION INTRAMUSCULAR; INTRAVENOUS ONCE
Qty: 1 VIAL | Refills: 0
Start: 2019-04-08 | End: 2019-04-08

## 2019-04-08 NOTE — PROGRESS NOTES
CC:   Chief Complaint   Patient presents with    Back Pain       HISTORY OF PRESENT ILLNESS  Jeffrey Coyle is a 72 y.o. female. Her PCP is Dr. Leticia Haynes. Presents for evaluation of back pain. She has HTN, fibromyalgia, and morbid obesity. She complains of right lower back pain that became worse over the past 2-3 days. Pain is 10/10 in severity, constant, achy, no radiation, worse with walking, similar to previous fibromyalgia pain, denies any recent injury or trauma. No relief with Aleve or Tramadol. Lyrica made her sleep for about 3 hours after taking it; does not like to take it because it makes her feel forgetful. Patient Active Problem List   Diagnosis Code    Essential hypertension I10    Fibromyalgia M79.7    Anisocoria H57.02    Obesity, morbid ([facility] [address] E66.01     Past Medical History:   Diagnosis Date    Hypertension      No Known Allergies    Current Outpatient Medications   Medication Sig Dispense Refill    naproxen sodium (ALEVE) 220 mg tablet Take 220 mg by mouth as needed.  traMADol (ULTRAM) 50 mg tablet TAKE 1 TABLET BY MOUTH EVERY 6 HOURS AS NEEDED FOR PAIN. MAX DAILY AMOUNT: 200MG/DAY 30 Tab 0    Cholecalciferol, Vitamin D3, (VITAMIN D3) 2,000 unit cap capsule Take  by mouth two (2) times a day.  furosemide (LASIX) 40 mg tablet TAKE 1/2 (ONE-HALF) TABLET BY MOUTH ONCE DAILY AS NEEDED 15 Tab 1    naproxen (NAPROSYN) 500 mg tablet Take 1 Tab by mouth daily as needed. 30 Tab 2    pregabalin (LYRICA) 75 mg capsule TAKE ONE CAPSULE BY MOUTH ONCE DAILY MAX  75  MG  PER/DAY 30 Cap 1    cetirizine (ZYRTEC) 10 mg tablet Take 5 mg by mouth daily as needed.  lisinopril (PRINIVIL, ZESTRIL) 10 mg tablet Take 10 mg by mouth daily.  acetaminophen (TYLENOL) 325 mg tablet Take  by mouth every four (4) hours as needed for Pain.            PHYSICAL EXAM  Visit Vitals  /68 (BP 1 Location: Left arm, BP Patient Position: Sitting)   Pulse 72   Temp 98.3 °F (36.8 °C) (Oral) Resp 18   Ht 5' (1.524 m)   Wt 237 lb 12.8 oz (107.9 kg)   SpO2 94%   BMI 46.44 kg/m²       General: Morbidly obese, no distress. Normal gait. HEENT:  Head normocephalic/atraumatic, no scleral icterus  Lungs:  Clear to ausculation bilaterally. Good air movement. Heart:  Regular rate and rhythm, normal S1 and S2, no murmur, gallop, or rub  Back: Normal ROM. Tenderness at right lumbar paravertebral muscles. Negative SLR bilaterally. Neurological: Alert and oriented. Psychiatric: Normal mood and affect. Behavior is normal.         ASSESSMENT AND PLAN    ICD-10-CM ICD-9-CM    1. Acute right-sided low back pain without sciatica M54.5 724.2 ketorolac tromethamine (TORADOL) 60 mg/2 mL soln      KETOROLAC TROMETHAMINE INJ      PA THER/PROPH/DIAG INJECTION, SUBCUT/IM   2. Fibromyalgia M79.7 729.1      Diagnoses and all orders for this visit:    1. Acute right-sided low back pain without sciatica  Most likely due to fibromyalgia. Given Toradol 60 mg IM in clinic:  -     ketorolac tromethamine (TORADOL) 60 mg/2 mL soln; 2 mL by IntraMUSCular route once for 1 dose. -     KETOROLAC TROMETHAMINE INJ  -     PA THER/PROPH/DIAG INJECTION, SUBCUT/IM  Advised to take Lyrica and Tramadol as needed for pain. 2. Fibromyalgia      Follow-up and Dispositions    · Return if symptoms worsen or fail to improve, for Scheduled appointment with Dr. Jayant Fuentes on 5/6/19. I have discussed the diagnosis with the patient and the intended plan as seen in the above orders. Patient is in agreement. The patient has received an after-visit summary and questions were answered concerning future plans. I have discussed medication side effects and warnings with the patient as well.

## 2019-04-08 NOTE — PATIENT INSTRUCTIONS
Back Care and Preventing Injuries: Care Instructions  Your Care Instructions    You can hurt your back doing many everyday activities: lifting a heavy box, bending down to garden, exercising at the gym, and even getting out of bed. But you can keep your back strong and healthy by doing some exercises. You also can follow a few tips for sitting, sleeping, and lifting to avoid hurting your back again. Talk to your doctor before you start an exercise program. Ask for help if you want to learn more about keeping your back healthy. Follow-up care is a key part of your treatment and safety. Be sure to make and go to all appointments, and call your doctor if you are having problems. It's also a good idea to know your test results and keep a list of the medicines you take. How can you care for yourself at home? · Stay at a healthy weight to avoid strain on your lower back. · Do not smoke. Smoking increases the risk of osteoporosis, which weakens the spine. If you need help quitting, talk to your doctor about stop-smoking programs and medicines. These can increase your chances of quitting for good. · Make sure you sleep in a position that maintains your back's normal curves and on a mattress that feels comfortable. Sleep on your side with a pillow between your knees, or sleep on your back with a pillow under your knees. These positions can reduce strain on your back. · When you get out of bed, lie on your side and bend both knees. Drop your feet over the edge of the bed as you push up with both arms. Scoot to the edge of the bed. Make sure your feet are in line with your rear end (buttocks), and then stand up. · If you must stand for a long time, put one foot on a stool, ledge, or box. Exercise to strengthen your back and other muscles  · Get at least 30 minutes of exercise on most days of the week. Walking is a good choice.  You also may want to do other activities, such as running, swimming, cycling, or playing tennis or team sports. · Stretch your back muscles. Here are few exercises to try:  ? Lie on your back with your knees bent and your feet flat on the floor. Gently pull one bent knee to your chest. Put that foot back on the floor, and then pull the other knee to your chest. Hold for 15 to 30 seconds. Repeat 2 to 4 times. ? Do pelvic tilts. Lie on your back with your knees bent. Tighten your stomach muscles. Pull your belly button (navel) in and up toward your ribs. You should feel like your back is pressing to the floor and your hips and pelvis are slightly lifting off the floor. Hold for 6 seconds while breathing smoothly. · Keep your core muscles strong. The muscles of your back, belly (abdomen), and buttocks support your spine. ? Pull in your belly, and imagine pulling your navel toward your spine. Hold this for 6 seconds, then relax. Remember to keep breathing normally as you tense your muscles. ? Do curl-ups. Always do them with your knees bent. Keep your low back on the floor, and curl your shoulders toward your knees using a smooth, slow motion. Keep your arms folded across your chest. If this bothers your neck, try putting your hands behind your neck (not your head), with your elbows spread apart. ? Lie on your back with your knees bent and your feet flat on the floor. Tighten your belly muscles, and then push with your feet and raise your buttocks up a few inches. Hold this position 6 seconds as you continue to breathe normally, then lower yourself slowly to the floor. Repeat 8 to 12 times. ? If you like group exercise, try Pilates or yoga. These classes have poses that strengthen the core muscles. Protect your back when you sit  · Place a small pillow, a rolled-up towel, or a lumbar roll in the curve of your back if you need extra support. · Sit in a chair that is low enough to let you place both feet flat on the floor with both knees nearly level with your hips.  If your chair or desk is too high, use a foot rest to raise your knees. · When driving, keep your knees nearly level with your hips. Sit straight, and drive with both hands on the steering wheel. Your arms should be in a slightly bent position. · Try a kneeling chair, which helps tilt your hips forward. This takes pressure off your lower back. · Try sitting on an exercise ball. It can rock from side to side, which helps keep your back loose. Lift properly  · Squat down, bending at the hips and knees only. If you need to, put one knee to the floor and extend your other knee in front of you, bent at a right angle (half kneeling). · Press your chest straight forward. This helps keep your upper back straight while keeping a slight arch in your low back. · Hold the load as close to your body as possible, at the level of your navel. · Use your feet to change direction, taking small steps. · Lead with your hips as you change direction. Keep your shoulders in line with your hips as you move. Do not twist your body. · Set down your load carefully, squatting with your knees and hips only. When should you call for help? Watch closely for changes in your health, and be sure to contact your doctor if you have any problems. Where can you learn more? Go to http://sarah-ingris.info/. Enter S810 in the search box to learn more about \"Back Care and Preventing Injuries: Care Instructions. \"  Current as of: September 20, 2018  Content Version: 11.9  © 3728-7567 [employer]. Care instructions adapted under license by [employer] (which disclaims liability or warranty for this information). If you have questions about a medical condition or this instruction, always ask your healthcare professional. [place] [address] any warranty or liability for your use of this information.

## 2019-04-08 NOTE — PROGRESS NOTES
Jeffrey Coyle  Identified pt with two pt identifiers(name and ). Chief Complaint   Patient presents with    Back Pain   After obtaining consent, and per orders of Dr. Sirisha Easton, injection of toradol 60 mg given right  dorsogluteal IM. 1. Have you been to the ER, urgent care clinic since your last visit? Hospitalized since your last visit? NO    2. Have you seen or consulted any other health care providers outside of the [address] [place] [address] since your last visit? Include any pap smears or colon screening. NO    Today's provider has been notified of reason for visit, vitals and flowsheets obtained on patients. Patient received paperwork for advance directive during previous visit but has not completed at this time     Reviewed record In preparation for visit, huddled with provider and have obtained necessary documentation      Health Maintenance Due   Topic    GLAUCOMA SCREENING Q2Y     Pneumococcal 65+ years (1 of 2 - PCV13)    BREAST CANCER SCRN MAMMOGRAM        Wt Readings from Last 3 Encounters:   19 237 lb 12.8 oz (107.9 kg)   10/18/18 234 lb (106.1 kg)   18 228 lb 3.2 oz (103.5 kg)     Temp Readings from Last 3 Encounters:   19 98.3 °F (36.8 °C) (Oral)   10/18/18 98.1 °F (36.7 °C) (Oral)   18 98.2 °F (36.8 °C) (Oral)     BP Readings from Last 3 Encounters:   19 125/68   10/18/18 144/70   18 125/74     Pulse Readings from Last 3 Encounters:   19 72   10/18/18 77   18 (!) 58     Vitals:    19 1317   BP: 125/68   Pulse: 72   Resp: 18   Temp: 98.3 °F (36.8 °C)   TempSrc: Oral   SpO2: 94%   Weight: 237 lb 12.8 oz (107.9 kg)   Height: 5' (1.524 m)   PainSc:  10 - Worst pain ever   PainLoc: Back         Learning Assessment:  :     No flowsheet data found.     Depression Screening:  :     3 most recent PHQ Screens 2019   Little interest or pleasure in doing things Not at all   Feeling down, depressed, irritable, or hopeless Not at all   Total Score PHQ 2 0       Fall Risk Assessment:  :     Fall Risk Assessment, last 12 mths 7/3/2018   Able to walk? Yes   Fall in past 12 months? Yes   Fall with injury? No   Number of falls in past 12 months 1   Fall Risk Score 1       Abuse Screening:  :     Abuse Screening Questionnaire 7/3/2018 5/17/2018   Do you ever feel afraid of your partner? N N   Are you in a relationship with someone who physically or mentally threatens you? N N   Is it safe for you to go home? Y Y       ADL Screening:  :     ADL Assessment 7/3/2018   Feeding yourself No Help Needed   Getting from bed to chair No Help Needed   Getting dressed No Help Needed   Bathing or showering No Help Needed   Walk across the room (includes cane/walker) No Help Needed   Using the telphone No Help Needed   Taking your medications No Help Needed   Preparing meals No Help Needed   Managing money (expenses/bills) No Help Needed   Moderately strenuous housework (laundry) No Help Needed   Shopping for personal items (toiletries/medicines) No Help Needed   Shopping for groceries No Help Needed   Driving No Help Needed   Climbing a flight of stairs No Help Needed   Getting to places beyond walking distances No Help Needed                 Medication reconciliation up to date and corrected with patient at this time.

## 2019-04-10 NOTE — PROGRESS NOTES
HPI  Bret Awad is a 72y.o. year old female patient of Enrique Byers MD who presents with c/o back pain. Pt has history of has Essential hypertension, Fibromyalgia, Anisocoria, and Obesity, morbid ([address] on their problem list..    Pt c/o persistent back pain on right side. States toradol helped with sharp pain. Lyrica and tramadol aren't helping at all, doesn't take them everyday normally but has been the last few days. Back only hurts when up on her feet, not when sitting or lying down or sleeping. Started initially 1 year ago with back pain but worse the last week. Pain is right side of back, radiates down to upper thigh. Hasn't had recent xrays but told curvature of spine in past.   Describes pain as numbness and tingling going down into leg, describes as gnawing pain. If rests her foot up on something pain goes away. Denies bowel or bladder incontinence. Seen by Dr. Kortney Killian on 4-8 for back pain, toradol IM given and advised to take lyrica and tramadol prn for fibromyalgia. Last tramadol Rx 12/28/18 per . Patient Active Problem List   Diagnosis Code    Essential hypertension I10    Fibromyalgia M79.7    Anisocoria H57.02    Obesity, morbid ([address] E66.01     Past Medical History:   Diagnosis Date    Hypertension      No past surgical history on file.   Social History     Socioeconomic History    Marital status: SINGLE     Spouse name: Not on file    Number of children: Not on file    Years of education: Not on file    Highest education level: Not on file   Tobacco Use    Smoking status: Never Smoker    Smokeless tobacco: Never Used   Substance and Sexual Activity    Alcohol use: No    Drug use: No    Sexual activity: Never     Family History   Problem Relation Age of Onset    Diabetes Mother     Heart Disease Mother     Hypertension Mother     Hypertension Father     Heart Disease Father      No Known Allergies    MEDICATIONS  Current Outpatient Medications Medication Sig    naproxen sodium (ALEVE) 220 mg tablet Take 220 mg by mouth as needed.  traMADol (ULTRAM) 50 mg tablet TAKE 1 TABLET BY MOUTH EVERY 6 HOURS AS NEEDED FOR PAIN. MAX DAILY AMOUNT: 200MG/DAY    Cholecalciferol, Vitamin D3, (VITAMIN D3) 2,000 unit cap capsule Take  by mouth two (2) times a day.  furosemide (LASIX) 40 mg tablet TAKE 1/2 (ONE-HALF) TABLET BY MOUTH ONCE DAILY AS NEEDED    naproxen (NAPROSYN) 500 mg tablet Take 1 Tab by mouth daily as needed.  pregabalin (LYRICA) 75 mg capsule TAKE ONE CAPSULE BY MOUTH ONCE DAILY MAX  75  MG  PER/DAY    cetirizine (ZYRTEC) 10 mg tablet Take 5 mg by mouth daily as needed.  lisinopril (PRINIVIL, ZESTRIL) 10 mg tablet Take 10 mg by mouth daily.  acetaminophen (TYLENOL) 325 mg tablet Take  by mouth every four (4) hours as needed for Pain. No current facility-administered medications for this visit. REVIEW OF SYSTEMS  Per HPI        Visit Vitals  /62 (BP 1 Location: Left arm, BP Patient Position: Sitting)   Pulse 62   Temp 98.1 °F (36.7 °C) (Oral)   Resp 18   Ht 5' (1.524 m)   Wt 239 lb (108.4 kg)   SpO2 96%   BMI 46.68 kg/m²         General: Well-developed, well-nourished. In no distress. A&O x 3. Head: Normocephalic, atraumatic. Eyes: Conjunctiva clear. Back: Symmetric. ROM intact, denies pain with spinal flexion or ext. Neg SLR-bilat. No CVA tenderness. TTP lumbar spine and R SI jt into right buttocks. Skin: No rashes or lesions. Neurological: CN II-XII intact. Normal strength, sensation throughout. Neurovasc: No edema appreciated. Musculoskeletal: Gait normal.   Psychiatric: Normal mood and affect. Behavior is normal.       No results found for any visits on 04/11/19. ASSESSMENT and PLAN  Diagnoses and all orders for this visit:    1. Acute right-sided low back pain with right-sided sciatica  -     XR SPINE LUMB 2 OR 3 V; Future  -     methylPREDNISolone (MEDROL DOSEPACK) 4 mg tablet;  Take as directed  -Suspect sciatica, will try course of steroids plus home exercise program  -Pt to return Monday for xrays- get imaging due to spine tenderness- r/o other causes for pain            Patient Instructions     Return Monday before 4:30 PM for your Xrays. Please contact our office if your symptoms worsen or do not improve. Please call 911 or go directly to the Emergency Department if you develop shortness of breath, chest pain, difficulty breathing or worsening of your symptoms. Sciatica: Care Instructions  Your Care Instructions    Sciatica (say \"eeo-MI-tm-kuh\") is an irritation of one of the sciatic nerves, which come from the spinal cord in the lower back. The sciatic nerves and their branches extend down through the buttock to the foot. Sciatica can develop when an injured disc in the back presses against a spinal nerve root. Its main symptom is pain, numbness, or weakness that is often worse in the leg or foot than in the back. Sciatica often will improve and go away with time. Early treatment usually includes medicines and exercises to relieve pain. Follow-up care is a key part of your treatment and safety. Be sure to make and go to all appointments, and call your doctor if you are having problems. It's also a good idea to know your test results and keep a list of the medicines you take. How can you care for yourself at home? · Take pain medicines exactly as directed. ? If the doctor gave you a prescription medicine for pain, take it as prescribed. ? If you are not taking a prescription pain medicine, ask your doctor if you can take an over-the-counter medicine. · Use heat or ice to relieve pain. ? To apply heat, put a warm water bottle, heating pad set on low, or warm cloth on your back. Do not go to sleep with a heating pad on your skin. ? To use ice, put ice or a cold pack on the area for 10 to 20 minutes at a time. Put a thin cloth between the ice and your skin.   · Avoid sitting if possible, unless it feels better than standing. · Alternate lying down with short walks. Increase your walking distance as you are able to without making your symptoms worse. · Do not do anything that makes your symptoms worse. When should you call for help? Call 911 anytime you think you may need emergency care. For example, call if:    · You are unable to move a leg at all.   [facility] your doctor now or seek immediate medical care if:    · You have new or worse symptoms in your legs or buttocks. Symptoms may include:  ? Numbness or tingling. ? Weakness. ? Pain.     · You lose bladder or bowel control.    Watch closely for changes in your health, and be sure to contact your doctor if:    · You are not getting better as expected. Where can you learn more? Go to http://sarah-ingris.info/. Enter [phone] in the search box to learn more about \"Sciatica: Care Instructions. \"  Current as of: September 20, 2018  Content Version: 11.9  © 1397-1370 [employer]. Care instructions adapted under license by [employer] (which disclaims liability or warranty for this information). If you have questions about a medical condition or this instruction, always ask your healthcare professional. [address] any warranty or liability for your use of this information. Sciatica: Exercises  Your Care Instructions  Here are some examples of typical rehabilitation exercises for your condition. Start each exercise slowly. Ease off the exercise if you start to have pain. Your doctor or physical therapist will tell you when you can start these exercises and which ones will work best for you. When you are not being active, find a comfortable position for rest. Some people are comfortable on the floor or a medium-firm bed with a small pillow under their head and another under their knees. Some people prefer to lie on their side with a pillow between their knees. Don't stay in one position for too long. Take short walks (10 to 20 minutes) every 2 to 3 hours. Avoid slopes, hills, and stairs until you feel better. Walk only distances you can manage without pain, especially leg pain. How to do the exercises  Back stretches    1. Get down on your hands and knees on the floor. 2. Relax your head and allow it to droop. Round your back up toward the ceiling until you feel a nice stretch in your upper, middle, and lower back. Hold this stretch for as long as it feels comfortable, or about 15 to 30 seconds. 3. Return to the starting position with a flat back while you are on your hands and knees. 4. Let your back sway by pressing your stomach toward the floor. Lift your buttocks toward the ceiling. 5. Hold this position for 15 to 30 seconds. 6. Repeat 2 to 4 times. Follow-up care is a key part of your treatment and safety. Be sure to make and go to all appointments, and call your doctor if you are having problems. It's also a good idea to know your test results and keep a list of the medicines you take. Where can you learn more? Go to http://sarah-ingris.info/. Enter L935 in the search box to learn more about \"Sciatica: Exercises. \"  Current as of: September 20, 2018  Content Version: 11.9  © 4100-9336 [employer]. Care instructions adapted under license by [employer] (which disclaims liability or warranty for this information). If you have questions about a medical condition or this instruction, always ask your healthcare professional. [place] [address] any warranty or liability for your use of this information. Low Back Pain: Exercises  Your Care Instructions  Here are some examples of typical rehabilitation exercises for your condition. Start each exercise slowly. Ease off the exercise if you start to have pain.   Your doctor or physical therapist will tell you when you can start these exercises and which ones will work best for you. How to do the exercises  Press-up    1. Lie on your stomach, supporting your body with your forearms. 2. Press your elbows down into the floor to raise your upper back. As you do this, relax your stomach muscles and allow your back to arch without using your back muscles. As your press up, do not let your hips or pelvis come off the floor. 3. Hold for 15 to 30 seconds, then relax. 4. Repeat 2 to 4 times. Alternate arm and leg (bird dog) exercise    1. Start on the floor, on your hands and knees. 2. Tighten your belly muscles. 3. Raise one leg off the floor, and hold it straight out behind you. Be careful not to let your hip drop down, because that will twist your trunk. 4. Hold for about 6 seconds, then lower your leg and switch to the other leg. 5. Repeat 8 to 12 times on each leg. 6. Over time, work up to holding for 10 to 30 seconds each time. 7. If you feel stable and secure with your leg raised, try raising the opposite arm straight out in front of you at the same time. Knee-to-chest exercise    1. Lie on your back with your knees bent and your feet flat on the floor. 2. Bring one knee to your chest, keeping the other foot flat on the floor (or keeping the other leg straight, whichever feels better on your lower back). 3. Keep your lower back pressed to the floor. Hold for at least 15 to 30 seconds. 4. Relax, and lower the knee to the starting position. 5. Repeat with the other leg. Repeat 2 to 4 times with each leg. 6. To get more stretch, put your other leg flat on the floor while pulling your knee to your chest.    Curl-ups    1. Lie on the floor on your back with your knees bent at a 90-degree angle. Your feet should be flat on the floor, about 12 inches from your buttocks. 2. Cross your arms over your chest. If this bothers your neck, try putting your hands behind your neck (not your head), with your elbows spread apart.   3. Slowly tighten your belly muscles and raise your shoulder blades off the floor. 4. Keep your head in line with your body, and do not press your chin to your chest.  5. Hold this position for 1 or 2 seconds, then slowly lower yourself back down to the floor. 6. Repeat 8 to 12 times. Pelvic tilt exercise    1. Lie on your back with your knees bent. 2. \"Brace\" your stomach. This means to tighten your muscles by pulling in and imagining your belly button moving toward your spine. You should feel like your back is pressing to the floor and your hips and pelvis are rocking back. 3. Hold for about 6 seconds while you breathe smoothly. 4. Repeat 8 to 12 times. Heel dig bridging    1. Lie on your back with both knees bent and your ankles bent so that only your heels are digging into the floor. Your knees should be bent about 90 degrees. 2. Then push your heels into the floor, squeeze your buttocks, and lift your hips off the floor until your shoulders, hips, and knees are all in a straight line. 3. Hold for about 6 seconds as you continue to breathe normally, and then slowly lower your hips back down to the floor and rest for up to 10 seconds. 4. Do 8 to 12 repetitions. Hamstring stretch in doorway    1. Lie on your back in a doorway, with one leg through the open door. 2. Slide your leg up the wall to straighten your knee. You should feel a gentle stretch down the back of your leg. 3. Hold the stretch for at least 15 to 30 seconds. Do not arch your back, point your toes, or bend either knee. Keep one heel touching the floor and the other heel touching the wall. 4. Repeat with your other leg. 5. Do 2 to 4 times for each leg. Hip flexor stretch    1. Kneel on the floor with one knee bent and one leg behind you. Place your forward knee over your foot. Keep your other knee touching the floor. 2. Slowly push your hips forward until you feel a stretch in the upper thigh of your rear leg.   3. Hold the stretch for at least 15 to 30 seconds. Repeat with your other leg. 4. Do 2 to 4 times on each side. Wall sit    1. Stand with your back 10 to 12 inches away from a wall. 2. Lean into the wall until your back is flat against it. 3. Slowly slide down until your knees are slightly bent, pressing your lower back into the wall. 4. Hold for about 6 seconds, then slide back up the wall. 5. Repeat 8 to 12 times. Follow-up care is a key part of your treatment and safety. Be sure to make and go to all appointments, and call your doctor if you are having problems. It's also a good idea to know your test results and keep a list of the medicines you take. Where can you learn more? Go to http://sarah-ingris.info/. Enter V421 in the search box to learn more about \"Low Back Pain: Exercises. \"  Current as of: September 20, 2018  Content Version: 11.9  © 0197-3549 [employer]. Care instructions adapted under license by [employer] (which disclaims liability or warranty for this information). If you have questions about a medical condition or this instruction, always ask your healthcare professional. [place] [address] any warranty or liability for your use of this information. Please keep your follow-up appointment with Maile Mejía MD.         Health Maintenance Due   Topic Date Due    GLAUCOMA SCREENING Q2Y  10/01/2012    Pneumococcal 65+ years (1 of 2 - PCV13) 10/01/2012    BREAST CANCER SCRN MAMMOGRAM  05/11/2019       I have discussed the diagnosis with the patient and the intended plan as seen in the above orders. Patient is in agreement. The patient has received an after-visit summary and questions were answered concerning future plans. I have discussed medication side effects and warnings with the patient as well. Warning signs for the above conditions were discussed including when to call our office or go to the emergency room.      The nurse provided the patient and/or family with advanced directive information if needed and encouraged the patient to provide a copy to the office when available.

## 2019-04-11 ENCOUNTER — OFFICE VISIT (OUTPATIENT)
Dept: INTERNAL MEDICINE CLINIC | Facility: CLINIC | Age: 74
End: 2019-04-11

## 2019-04-11 VITALS
OXYGEN SATURATION: 96 % | DIASTOLIC BLOOD PRESSURE: 62 MMHG | TEMPERATURE: 98.1 F | RESPIRATION RATE: 18 BRPM | WEIGHT: 239 LBS | SYSTOLIC BLOOD PRESSURE: 102 MMHG | HEART RATE: 62 BPM | BODY MASS INDEX: 46.92 KG/M2 | HEIGHT: 60 IN

## 2019-04-11 DIAGNOSIS — M54.41 ACUTE RIGHT-SIDED LOW BACK PAIN WITH RIGHT-SIDED SCIATICA: Primary | ICD-10-CM

## 2019-04-11 RX ORDER — METHYLPREDNISOLONE 4 MG/1
TABLET ORAL
Qty: 1 DOSE PACK | Refills: 0 | Status: SHIPPED | OUTPATIENT
Start: 2019-04-11 | End: 2019-05-06 | Stop reason: ALTCHOICE

## 2019-04-11 NOTE — PATIENT INSTRUCTIONS
Return Monday before 4:30 PM for your Xrays. Please contact our office if your symptoms worsen or do not improve. Please call 911 or go directly to the Emergency Department if you develop shortness of breath, chest pain, difficulty breathing or worsening of your symptoms. Sciatica: Care Instructions  Your Care Instructions    Sciatica (say \"zza-XJ-nj-kuh\") is an irritation of one of the sciatic nerves, which come from the spinal cord in the lower back. The sciatic nerves and their branches extend down through the buttock to the foot. Sciatica can develop when an injured disc in the back presses against a spinal nerve root. Its main symptom is pain, numbness, or weakness that is often worse in the leg or foot than in the back. Sciatica often will improve and go away with time. Early treatment usually includes medicines and exercises to relieve pain. Follow-up care is a key part of your treatment and safety. Be sure to make and go to all appointments, and call your doctor if you are having problems. It's also a good idea to know your test results and keep a list of the medicines you take. How can you care for yourself at home? · Take pain medicines exactly as directed. ? If the doctor gave you a prescription medicine for pain, take it as prescribed. ? If you are not taking a prescription pain medicine, ask your doctor if you can take an over-the-counter medicine. · Use heat or ice to relieve pain. ? To apply heat, put a warm water bottle, heating pad set on low, or warm cloth on your back. Do not go to sleep with a heating pad on your skin. ? To use ice, put ice or a cold pack on the area for 10 to 20 minutes at a time. Put a thin cloth between the ice and your skin. · Avoid sitting if possible, unless it feels better than standing. · Alternate lying down with short walks. Increase your walking distance as you are able to without making your symptoms worse.   · Do not do anything that makes your symptoms worse. When should you call for help? Call 911 anytime you think you may need emergency care. For example, call if:    · You are unable to move a leg at all.   [facility] your doctor now or seek immediate medical care if:    · You have new or worse symptoms in your legs or buttocks. Symptoms may include:  ? Numbness or tingling. ? Weakness. ? Pain.     · You lose bladder or bowel control.    Watch closely for changes in your health, and be sure to contact your doctor if:    · You are not getting better as expected. Where can you learn more? Go to http://sarahKeycooptingris.info/. Enter [phone] in the search box to learn more about \"Sciatica: Care Instructions. \"  Current as of: September 20, 2018  Content Version: 11.9  © 1147-8640 [employer]. Care instructions adapted under license by [employer] (which disclaims liability or warranty for this information). If you have questions about a medical condition or this instruction, always ask your healthcare professional. [place] [address] any warranty or liability for your use of this information. Sciatica: Exercises  Your Care Instructions  Here are some examples of typical rehabilitation exercises for your condition. Start each exercise slowly. Ease off the exercise if you start to have pain. Your doctor or physical therapist will tell you when you can start these exercises and which ones will work best for you. When you are not being active, find a comfortable position for rest. Some people are comfortable on the floor or a medium-firm bed with a small pillow under their head and another under their knees. Some people prefer to lie on their side with a pillow between their knees. Don't stay in one position for too long. Take short walks (10 to 20 minutes) every 2 to 3 hours. Avoid slopes, hills, and stairs until you feel better.  Walk only distances you can manage without pain, especially leg pain.  How to do the exercises  Back stretches    1. Get down on your hands and knees on the floor. 2. Relax your head and allow it to droop. Round your back up toward the ceiling until you feel a nice stretch in your upper, middle, and lower back. Hold this stretch for as long as it feels comfortable, or about 15 to 30 seconds. 3. Return to the starting position with a flat back while you are on your hands and knees. 4. Let your back sway by pressing your stomach toward the floor. Lift your buttocks toward the ceiling. 5. Hold this position for 15 to 30 seconds. 6. Repeat 2 to 4 times. Follow-up care is a key part of your treatment and safety. Be sure to make and go to all appointments, and call your doctor if you are having problems. It's also a good idea to know your test results and keep a list of the medicines you take. Where can you learn more? Go to http://sarahGHEN MATERIALSingris.info/. Enter Y592 in the search box to learn more about \"Sciatica: Exercises. \"  Current as of: September 20, 2018  Content Version: 11.9  © 9486-0163 [employer]. Care instructions adapted under license by [employer] (which disclaims liability or warranty for this information). If you have questions about a medical condition or this instruction, always ask your healthcare professional. [address] any warranty or liability for your use of this information. Low Back Pain: Exercises  Your Care Instructions  Here are some examples of typical rehabilitation exercises for your condition. Start each exercise slowly. Ease off the exercise if you start to have pain. Your doctor or physical therapist will tell you when you can start these exercises and which ones will work best for you. How to do the exercises  Press-up    1. Lie on your stomach, supporting your body with your forearms. 2. Press your elbows down into the floor to raise your upper back.  As you do this, relax your stomach muscles and allow your back to arch without using your back muscles. As your press up, do not let your hips or pelvis come off the floor. 3. Hold for 15 to 30 seconds, then relax. 4. Repeat 2 to 4 times. Alternate arm and leg (bird dog) exercise    1. Start on the floor, on your hands and knees. 2. Tighten your belly muscles. 3. Raise one leg off the floor, and hold it straight out behind you. Be careful not to let your hip drop down, because that will twist your trunk. 4. Hold for about 6 seconds, then lower your leg and switch to the other leg. 5. Repeat 8 to 12 times on each leg. 6. Over time, work up to holding for 10 to 30 seconds each time. 7. If you feel stable and secure with your leg raised, try raising the opposite arm straight out in front of you at the same time. Knee-to-chest exercise    1. Lie on your back with your knees bent and your feet flat on the floor. 2. Bring one knee to your chest, keeping the other foot flat on the floor (or keeping the other leg straight, whichever feels better on your lower back). 3. Keep your lower back pressed to the floor. Hold for at least 15 to 30 seconds. 4. Relax, and lower the knee to the starting position. 5. Repeat with the other leg. Repeat 2 to 4 times with each leg. 6. To get more stretch, put your other leg flat on the floor while pulling your knee to your chest.    Curl-ups    1. Lie on the floor on your back with your knees bent at a 90-degree angle. Your feet should be flat on the floor, about 12 inches from your buttocks. 2. Cross your arms over your chest. If this bothers your neck, try putting your hands behind your neck (not your head), with your elbows spread apart. 3. Slowly tighten your belly muscles and raise your shoulder blades off the floor.   4. Keep your head in line with your body, and do not press your chin to your chest.  5. Hold this position for 1 or 2 seconds, then slowly lower yourself back down to the floor. 6. Repeat 8 to 12 times. Pelvic tilt exercise    1. Lie on your back with your knees bent. 2. \"Brace\" your stomach. This means to tighten your muscles by pulling in and imagining your belly button moving toward your spine. You should feel like your back is pressing to the floor and your hips and pelvis are rocking back. 3. Hold for about 6 seconds while you breathe smoothly. 4. Repeat 8 to 12 times. Heel dig bridging    1. Lie on your back with both knees bent and your ankles bent so that only your heels are digging into the floor. Your knees should be bent about 90 degrees. 2. Then push your heels into the floor, squeeze your buttocks, and lift your hips off the floor until your shoulders, hips, and knees are all in a straight line. 3. Hold for about 6 seconds as you continue to breathe normally, and then slowly lower your hips back down to the floor and rest for up to 10 seconds. 4. Do 8 to 12 repetitions. Hamstring stretch in doorway    1. Lie on your back in a doorway, with one leg through the open door. 2. Slide your leg up the wall to straighten your knee. You should feel a gentle stretch down the back of your leg. 3. Hold the stretch for at least 15 to 30 seconds. Do not arch your back, point your toes, or bend either knee. Keep one heel touching the floor and the other heel touching the wall. 4. Repeat with your other leg. 5. Do 2 to 4 times for each leg. Hip flexor stretch    1. Kneel on the floor with one knee bent and one leg behind you. Place your forward knee over your foot. Keep your other knee touching the floor. 2. Slowly push your hips forward until you feel a stretch in the upper thigh of your rear leg. 3. Hold the stretch for at least 15 to 30 seconds. Repeat with your other leg. 4. Do 2 to 4 times on each side. Wall sit    1. Stand with your back 10 to 12 inches away from a wall. 2. Lean into the wall until your back is flat against it.   3. Slowly slide down until your knees are slightly bent, pressing your lower back into the wall. 4. Hold for about 6 seconds, then slide back up the wall. 5. Repeat 8 to 12 times. Follow-up care is a key part of your treatment and safety. Be sure to make and go to all appointments, and call your doctor if you are having problems. It's also a good idea to know your test results and keep a list of the medicines you take. Where can you learn more? Go to http://sarah-ingris.info/. Enter S136 in the search box to learn more about \"Low Back Pain: Exercises. \"  Current as of: September 20, 2018  Content Version: 11.9  © 2770-8796 [employer]. Care instructions adapted under license by [employer] (which disclaims liability or warranty for this information). If you have questions about a medical condition or this instruction, always ask your healthcare professional. [address] any warranty or liability for your use of this information.

## 2019-04-11 NOTE — PROGRESS NOTES
Matias Strong  Identified pt with two pt identifiers(name and ). Chief Complaint   Patient presents with    Back Pain     right side down right leg    Medication Evaluation       Reviewed record In preparation for visit and have obtained necessary documentation. Has info on advanced directive but has not filled them out. 1. Have you been to the ER, urgent care clinic or hospitalized since your last visit? No     2. Have you seen or consulted any other health care providers outside of the [address] [place] [address] since your last visit? Include any pap smears or colon screening. No    Vitals reviewed with provider.     Health Maintenance reviewed:     Health Maintenance Due   Topic    GLAUCOMA SCREENING Q2Y     Pneumococcal 65+ years (1 of 2 - PCV13)    BREAST CANCER SCRN MAMMOGRAM           Wt Readings from Last 3 Encounters:   19 239 lb (108.4 kg)   19 237 lb 12.8 oz (107.9 kg)   10/18/18 234 lb (106.1 kg)        Temp Readings from Last 3 Encounters:   19 98.1 °F (36.7 °C) (Oral)   19 98.3 °F (36.8 °C) (Oral)   10/18/18 98.1 °F (36.7 °C) (Oral)        BP Readings from Last 3 Encounters:   19 102/62   19 125/68   10/18/18 144/70        Pulse Readings from Last 3 Encounters:   19 62   19 72   10/18/18 77        Vitals:    19 1535   BP: 102/62   Pulse: 62   Resp: 18   Temp: 98.1 °F (36.7 °C)   TempSrc: Oral   SpO2: 96%   Weight: 239 lb (108.4 kg)   Height: 5' (1.524 m)   PainSc:   8   PainLoc: Back          Learning Assessment:   :       Learning Assessment 2019   PRIMARY LEARNER Patient   BARRIERS PRIMARY LEARNER NONE   CO-LEARNER CAREGIVER No   PRIMARY LANGUAGE ENGLISH   LEARNER PREFERENCE PRIMARY DEMONSTRATION   ANSWERED BY self   RELATIONSHIP SELF        Depression Screening:   :       3 most recent PHQ Screens 2019   Little interest or pleasure in doing things Not at all   Feeling down, depressed, irritable, or hopeless Not at all Total Score PHQ 2 0        Fall Risk Assessment:   :       Fall Risk Assessment, last 12 mths 7/3/2018   Able to walk? Yes   Fall in past 12 months? Yes   Fall with injury? No   Number of falls in past 12 months 1   Fall Risk Score 1        Abuse Screening:   :       Abuse Screening Questionnaire 7/3/2018 5/17/2018   Do you ever feel afraid of your partner? N N   Are you in a relationship with someone who physically or mentally threatens you? N N   Is it safe for you to go home?  Y Y        ADL Screening:   :       ADL Assessment 7/3/2018   Feeding yourself No Help Needed   Getting from bed to chair No Help Needed   Getting dressed No Help Needed   Bathing or showering No Help Needed   Walk across the room (includes cane/walker) No Help Needed   Using the telphone No Help Needed   Taking your medications No Help Needed   Preparing meals No Help Needed   Managing money (expenses/bills) No Help Needed   Moderately strenuous housework (laundry) No Help Needed   Shopping for personal items (toiletries/medicines) No Help Needed   Shopping for groceries No Help Needed   Driving No Help Needed   Climbing a flight of stairs No Help Needed   Getting to places beyond walking distances No Help Needed

## 2019-05-06 ENCOUNTER — OFFICE VISIT (OUTPATIENT)
Dept: INTERNAL MEDICINE CLINIC | Facility: CLINIC | Age: 74
End: 2019-05-06

## 2019-05-06 VITALS
SYSTOLIC BLOOD PRESSURE: 111 MMHG | HEIGHT: 60 IN | DIASTOLIC BLOOD PRESSURE: 69 MMHG | TEMPERATURE: 98.1 F | WEIGHT: 234 LBS | BODY MASS INDEX: 45.94 KG/M2 | HEART RATE: 63 BPM | RESPIRATION RATE: 16 BRPM | OXYGEN SATURATION: 97 %

## 2019-05-06 DIAGNOSIS — J01.91 ACUTE RECURRENT SINUSITIS, UNSPECIFIED LOCATION: ICD-10-CM

## 2019-05-06 DIAGNOSIS — M79.7 FIBROMYALGIA: ICD-10-CM

## 2019-05-06 DIAGNOSIS — E66.01 OBESITY, MORBID (HCC): ICD-10-CM

## 2019-05-06 DIAGNOSIS — I10 ESSENTIAL HYPERTENSION: Primary | ICD-10-CM

## 2019-05-06 RX ORDER — AMOXICILLIN AND CLAVULANATE POTASSIUM 875; 125 MG/1; MG/1
1 TABLET, FILM COATED ORAL EVERY 12 HOURS
Qty: 20 TAB | Refills: 0 | Status: SHIPPED | OUTPATIENT
Start: 2019-05-06 | End: 2019-05-16

## 2019-05-06 NOTE — PROGRESS NOTES
HPI  Ms. Julissa Haynes is a 72y.o. year old female, she is seen today for follow up HTN, fibromyalgia. Has been taking indomethacin about 3 x per week in the evening which helps back pain. Has appt with Dr. Gustavo Moreno upcoming next week. Says pain is better if she sleeps on her back, but this can be hard to do as it's hard to fall asleep on her back. toradol injection on 4/8/19 in office helped back pain significantly. Working 8 hours a day, 5 days per week. Fibromyalgia pain is fairly well controlled - lyrica helps but she doesn't take it daily. Takes tramadol rarely which also helps. No chest pain or sob, no dizziness or lightheadedness. Hasn't seen nephrology in a while. Says while making a meatball heard snapping sound left wrist, had swelling at the site - much better now. Tells me was recently treated with zpack for sinus infection which helped some but never resolved completely. Continues to have postnasal drip, nasal congestion, green mucous from nose. Has lost 5# since last visit. Chief Complaint   Patient presents with    Blood Pressure Check     Room 2A// seen at [facility] x 2 weeks ago- was on z-pack for sinus infection- still has sx's // eye appt scheduled for August    Fibromyalgia     appt with Dr Gustavo Moreno next week for back pain     Wrist Pain     seen at [facility]- given PRN Indomethacin         Prior to Admission medications    Medication Sig Start Date End Date Taking? Authorizing Provider   amoxicillin-clavulanate (AUGMENTIN) 875-125 mg per tablet Take 1 Tab by mouth every twelve (12) hours for 10 days. 5/6/19 5/16/19 Yes Lety Oliver MD   naproxen sodium (ALEVE) 220 mg tablet Take 220 mg by mouth as needed. Yes Provider, Historical   traMADol (ULTRAM) 50 mg tablet TAKE 1 TABLET BY MOUTH EVERY 6 HOURS AS NEEDED FOR PAIN.   MAX DAILY AMOUNT: 200MG/DAY 12/26/18  Yes Lety Oliver MD   furosemide (LASIX) 40 mg tablet TAKE 1/2 (ONE-HALF) TABLET BY MOUTH ONCE DAILY AS NEEDED 8/27/18  Yes Ava Wright MD   naproxen (NAPROSYN) 500 mg tablet Take 1 Tab by mouth daily as needed. 8/17/18  Yes Debby Escobar NP   pregabalin (LYRICA) 75 mg capsule TAKE ONE CAPSULE BY MOUTH ONCE DAILY MAX  75  MG  PER/DAY 7/3/18  Yes Ava Wright MD   cetirizine (ZYRTEC) 10 mg tablet Take 5 mg by mouth daily as needed. Yes Provider, Historical   lisinopril (PRINIVIL, ZESTRIL) 10 mg tablet Take 10 mg by mouth daily. 11/28/17  Yes Provider, Historical   acetaminophen (TYLENOL) 325 mg tablet Take  by mouth every four (4) hours as needed for Pain. Yes Provider, Historical   Cholecalciferol, Vitamin D3, (VITAMIN D3) 2,000 unit cap capsule Take  by mouth two (2) times a day. Provider, Historical         No Known Allergies      REVIEW OF SYSTEMS:  Per HPI    PHYSICAL EXAM:  Visit Vitals  /69 (BP 1 Location: Left arm, BP Patient Position: Sitting)   Pulse 63   Temp 98.1 °F (36.7 °C) (Oral)   Resp 16   Ht 5' (1.524 m)   Wt 234 lb (106.1 kg)   SpO2 97%   BMI 45.70 kg/m²     Constitutional: Appears well-developed and well-nourished. No distress. HENT:   Head: Normocephalic and atraumatic. Eyes: No scleral icterus. Nose: nares patent, mucosa erythematous and congested  Mouth: OP clear without lesions, no pharyngeal exudate  Neck: no lad, no tm, supple   Cardiovascular: Normal S1/S2, regular rhythm. No murmurs, rubs, or gallops. Pulmonary/Chest: Effort normal and breath sounds normal. No respiratory distress. No wheezes, rhonchi, or rales. Back: no CVA tenderness, back non tender to palpation  Ext: No edema. Neurological: Alert. Psychiatric: Normal mood and affect.  Behavior is normal.     Lab Results   Component Value Date/Time    Sodium 141 12/19/2017 04:25 PM    Potassium 4.4 12/19/2017 04:25 PM    Chloride 98 12/19/2017 04:25 PM    CO2 25 12/19/2017 04:25 PM    Anion gap 7 05/16/2010 09:05 AM    Glucose 95 12/19/2017 04:25 PM    BUN 23 12/19/2017 04:25 PM Creatinine 0.85 12/19/2017 04:25 PM    BUN/Creatinine ratio 27 12/19/2017 04:25 PM    GFR est AA 80 12/19/2017 04:25 PM    GFR est non-AA 70 12/19/2017 04:25 PM    Calcium 9.8 12/19/2017 04:25 PM    Bilirubin, total 0.2 04/18/2017 04:24 PM    AST (SGOT) 18 04/18/2017 04:24 PM    Alk. phosphatase 98 04/18/2017 04:24 PM    Protein, total 6.7 04/18/2017 04:24 PM    Albumin 4.5 04/18/2017 04:24 PM    Globulin 3.7 05/16/2010 09:05 AM    A-G Ratio 2.0 04/18/2017 04:24 PM    ALT (SGPT) 15 04/18/2017 04:24 PM     No results found for: HBA1C, HGBE8, WOM7RWHP, ZKQ4IUMB   Lab Results   Component Value Date/Time    Cholesterol, total 161 05/18/2017 11:18 AM    HDL Cholesterol 51 05/18/2017 11:18 AM    LDL, calculated 90 05/18/2017 11:18 AM    VLDL, calculated 20 05/18/2017 11:18 AM    Triglyceride 100 05/18/2017 11:18 AM          ASSESSMENT/PLAN  Diagnoses and all orders for this visit:    1. Essential hypertension  -     METABOLIC PANEL, COMPREHENSIVE  Controlled on current regimen, continue   2. Fibromyalgia  Stable on current regimen - will use lyrica for flares and tramadol as well - not frequent  3. Obesity, morbid ([address]  Losing weight - encouraged portion control  4. BMI 45.0-49.9, adult ([address]  See above  5. Acute recurrent sinusitis, unspecified location  -     amoxicillin-clavulanate (AUGMENTIN) 875-125 mg per tablet; Take 1 Tab by mouth every twelve (12) hours for 10 days. Health Maintenance Due   Topic Date Due    Pneumococcal 65+ years (1 of 2 - PCV13) 10/01/2012    BREAST CANCER SCRN MAMMOGRAM  05/11/2019        Follow-up and Dispositions    · Return in about 3 months (around 8/6/2019) for bp, fibromyalgia, pain, AWV. Reviewed plan of care. Patient has provided input and agrees with goals. The nurse provided the patient and/or family with advanced directive information if needed and encouraged the patient to provide a copy to the office when available.

## 2019-05-06 NOTE — PROGRESS NOTES
Faith Ko  Identified pt with two pt identifiers(name and ). Chief Complaint   Patient presents with    Blood Pressure Check     Room 2A//     Fibromyalgia       1. Have you been to the ER, urgent care clinic since your last visit? Hospitalized since your last visit? NO    2. Have you seen or consulted any other health care providers outside of the [address] [place] [address] since your last visit? Include any pap smears or colon screening. NO      Provider notified of reason for visit, vitals and flowsheets obtained on patients. Patient received paperwork for advance directive during previous visit but has not completed at this time     Reviewed record In preparation for visit, huddled with provider and have obtained necessary documentation      Health Maintenance Due   Topic    GLAUCOMA SCREENING Q2Y     Pneumococcal 65+ years (1 of 2 - PCV13)    BREAST CANCER SCRN MAMMOGRAM        Wt Readings from Last 3 Encounters:   19 239 lb (108.4 kg)   19 237 lb 12.8 oz (107.9 kg)   10/18/18 234 lb (106.1 kg)     Temp Readings from Last 3 Encounters:   19 98.1 °F (36.7 °C) (Oral)   19 98.3 °F (36.8 °C) (Oral)   10/18/18 98.1 °F (36.7 °C) (Oral)     BP Readings from Last 3 Encounters:   19 102/62   19 125/68   10/18/18 144/70     Pulse Readings from Last 3 Encounters:   19 62   19 72   10/18/18 77     There were no vitals filed for this visit.       Learning Assessment:  :     Learning Assessment 2019   PRIMARY LEARNER Patient   BARRIERS PRIMARY LEARNER NONE   CO-LEARNER CAREGIVER No   PRIMARY LANGUAGE ENGLISH   LEARNER PREFERENCE PRIMARY DEMONSTRATION   ANSWERED BY self   RELATIONSHIP SELF       Depression Screening:  :     3 most recent PHQ Screens 2019   Little interest or pleasure in doing things Not at all   Feeling down, depressed, irritable, or hopeless Not at all   Total Score PHQ 2 0       Fall Risk Assessment:  :     Fall Risk Assessment, last 12 mths 7/3/2018   Able to walk? Yes   Fall in past 12 months? Yes   Fall with injury? No   Number of falls in past 12 months 1   Fall Risk Score 1       Abuse Screening:  :     Abuse Screening Questionnaire 7/3/2018 5/17/2018   Do you ever feel afraid of your partner? N N   Are you in a relationship with someone who physically or mentally threatens you? N N   Is it safe for you to go home? Y Y       ADL Screening:  :     ADL Assessment 7/3/2018   Feeding yourself No Help Needed   Getting from bed to chair No Help Needed   Getting dressed No Help Needed   Bathing or showering No Help Needed   Walk across the room (includes cane/walker) No Help Needed   Using the telphone No Help Needed   Taking your medications No Help Needed   Preparing meals No Help Needed   Managing money (expenses/bills) No Help Needed   Moderately strenuous housework (laundry) No Help Needed   Shopping for personal items (toiletries/medicines) No Help Needed   Shopping for groceries No Help Needed   Driving No Help Needed   Climbing a flight of stairs No Help Needed   Getting to places beyond walking distances No Help Needed         Medication reconciliation up to date and corrected with patient at this time.

## 2019-05-07 LAB
ALBUMIN SERPL-MCNC: 4.4 G/DL (ref 3.5–4.8)
ALBUMIN/GLOB SERPL: 1.8 {RATIO} (ref 1.2–2.2)
ALP SERPL-CCNC: 158 IU/L (ref 39–117)
ALT SERPL-CCNC: 15 IU/L (ref 0–32)
AST SERPL-CCNC: 14 IU/L (ref 0–40)
BILIRUB SERPL-MCNC: 0.2 MG/DL (ref 0–1.2)
BUN SERPL-MCNC: 40 MG/DL (ref 8–27)
BUN/CREAT SERPL: 36 (ref 12–28)
CALCIUM SERPL-MCNC: 9.7 MG/DL (ref 8.7–10.3)
CHLORIDE SERPL-SCNC: 106 MMOL/L (ref 96–106)
CO2 SERPL-SCNC: 21 MMOL/L (ref 20–29)
CREAT SERPL-MCNC: 1.12 MG/DL (ref 0.57–1)
GLOBULIN SER CALC-MCNC: 2.4 G/DL (ref 1.5–4.5)
GLUCOSE SERPL-MCNC: 86 MG/DL (ref 65–99)
POTASSIUM SERPL-SCNC: 5.5 MMOL/L (ref 3.5–5.2)
PROT SERPL-MCNC: 6.8 G/DL (ref 6–8.5)
SODIUM SERPL-SCNC: 140 MMOL/L (ref 134–144)

## 2019-05-13 ENCOUNTER — TELEPHONE (OUTPATIENT)
Dept: INTERNAL MEDICINE CLINIC | Facility: CLINIC | Age: 74
End: 2019-05-13

## 2019-05-13 NOTE — TELEPHONE ENCOUNTER
Pt was seen by Ortho and will start PT next week. Will also be seen on June 23rd by Ortho for cortisone injection discussion. Pt asking for Indomethacin to tide her over until the Cortisone appt. Was originally rx'ed by [address] and this was discussed with Dr Dora Crump at her May 6th appt. She asked for it from Ortho but they referred her back to Dr Dora Crump for this rx.

## 2019-05-13 NOTE — TELEPHONE ENCOUNTER
Pt called to speak with the nurse in regards to getting a medication called indomethacin 50 mg. Pt has seen ortho and they recommended her to get PT. Pt stated this medication will help the patient.

## 2019-05-13 NOTE — TELEPHONE ENCOUNTER
Kidney function was worse on last lab check. Needs repeat BMP within next few weeks and cannot take nsaids including naproxen, aleve, ibuprofen, indomethacin now. Okay to take tramadol.

## 2019-05-14 NOTE — TELEPHONE ENCOUNTER
Verified patients name and date of birth. Patient stated she was seen for back pain at Orthopedic office/[facility]. Was referred to PT and will eventually get injection in back. She wants a prescription for indomethacin (for her back pain)which she states she was given before for pain and it worked well. Advised patient we do not generally prescribe indomethacin for arthritic back pain and also advised her that per Dr Angeles Harris she has advised her NOT to take any NSAID's. Advised her to take tramadol/tylenol for back pain(unless she has been told previously to not take tylenol. Attempted to schedule her for the repeat BMP but she said she will call back to schedule this appt.

## 2019-06-28 DIAGNOSIS — M79.7 FIBROMYALGIA: ICD-10-CM

## 2019-06-28 RX ORDER — TRAMADOL HYDROCHLORIDE 50 MG/1
50 TABLET ORAL
Qty: 30 TAB | Refills: 0 | OUTPATIENT
Start: 2019-06-28 | End: 2019-11-27 | Stop reason: SDUPTHER

## 2019-07-25 DIAGNOSIS — I10 ESSENTIAL HYPERTENSION: ICD-10-CM

## 2019-07-25 RX ORDER — LISINOPRIL 10 MG/1
10 TABLET ORAL DAILY
Qty: 90 TAB | Refills: 5 | Status: SHIPPED | OUTPATIENT
Start: 2019-07-25 | End: 2019-07-30 | Stop reason: SDUPTHER

## 2019-07-25 NOTE — TELEPHONE ENCOUNTER
Refill       Requested Prescriptions     Pending Prescriptions Disp Refills    lisinopril (PRINIVIL, ZESTRIL) 10 mg tablet       Sig: Take 1 Tab by mouth daily.

## 2019-07-25 NOTE — TELEPHONE ENCOUNTER
PCP: Chintan Olvera MD     Last appt: 5/6/2019   Future Appointments   Date Time Provider Nitish Bob   8/19/2019  2:30 PM Chintan Olvera  [facility]        Requested Prescriptions     Pending Prescriptions Disp Refills    lisinopril (PRINIVIL, ZESTRIL) 10 mg tablet       Sig: Take 1 Tab by mouth daily.

## 2019-07-30 DIAGNOSIS — I10 ESSENTIAL HYPERTENSION: ICD-10-CM

## 2019-07-31 RX ORDER — LISINOPRIL 10 MG/1
10 TABLET ORAL DAILY
Qty: 90 TAB | Refills: 5 | Status: SHIPPED | OUTPATIENT
Start: 2019-07-31 | End: 2020-10-19

## 2019-08-09 NOTE — PERIOP NOTES
[facility]  Ambulatory Surgery Unit  Pre-operative Instructions    Procedure Date  Tuesday, August 13, 2019            Tentative Arrival Time 145      1. On the day of your procedure, please report to the Ambulatory Surgery Unit Registration Desk and sign in at your designated time. The Ambulatory Surgery Unit is located in [facility] on the [facility] side of the \[facility]\"" across from the [address] [place] [address]. Please have all of your health insurance cards and a photo ID. 2. You must have someone with you to drive you home as directed by your surgeon. 3. You may have a light breakfast and take normal morning medications. 4. We recommend you do not drink any alcoholic beverages for 24 hours before and after your procedure. 5. Contact your surgeons office for instructions on the following medications: non-steroidal anti-inflammatory drugs (i.e. Advil, Aleve), vitamins, and supplements. (Some surgeons will want you to stop these medications prior to surgery and others may allow you to take them)   **If you are currently taking Plavix, Coumadin, Aspirin and/or other blood-thinning agents, contact your surgeon for instructions. ** Your surgeon will partner with the physician prescribing these medications to determine if it is safe to stop or if you need to continue taking. Please do not stop taking these medications without instructions from your surgeon. 6. In an effort to help prevent surgical site infection, we ask that you shower with an anti-bacterial soap (i.e. Dial or Safeguard) on the morning of your procedure. Do not apply any lotions, powders, or deodorants after showering. 7. Wear comfortable clothes. Wear glasses instead of contacts. Do not bring any jewelry or money (other than copays or fees as instructed). Do not wear make-up, particularly mascara, the morning of your procedure. Wear your hair loose or down, no ponytails, buns, chelly pins or clips.  All body piercings must be removed. 8. You should understand that if you do not follow these instructions your procedure may be cancelled. If your physical condition changes (i.e. fever, cold or flu) please contact your surgeon as soon as possible. 9. It is important that you be on time. If a situation occurs where you may be late, or if you have any questions or problems, please call [phone].    10. Your procedure time may be subject to change. You will receive a phone call the day prior to confirm your arrival time. I understand a pre-operative phone call will be made to verify my procedure time. In the event that I am not available, I give permission for a message to be left on my answering service and/or with another person?       yes    Preop instructions reviewed  Pt verbalized understanding.      ___________________      ___________________      ___________________  (Signature of Patient)          (Witness)                   (Date and Time)

## 2019-08-13 ENCOUNTER — APPOINTMENT (OUTPATIENT)
Dept: GENERAL RADIOLOGY | Age: 74
End: 2019-08-13
Attending: PHYSICAL MEDICINE & REHABILITATION
Payer: MEDICARE

## 2019-08-13 ENCOUNTER — HOSPITAL ENCOUNTER (OUTPATIENT)
Age: 74
Setting detail: OUTPATIENT SURGERY
Discharge: HOME | End: 2019-08-13
Attending: PHYSICAL MEDICINE & REHABILITATION | Admitting: PHYSICAL MEDICINE & REHABILITATION
Payer: MEDICARE

## 2019-08-13 VITALS
OXYGEN SATURATION: 98 % | WEIGHT: 237 LBS | HEART RATE: 67 BPM | SYSTOLIC BLOOD PRESSURE: 104 MMHG | TEMPERATURE: 98.1 F | BODY MASS INDEX: 46.53 KG/M2 | DIASTOLIC BLOOD PRESSURE: 75 MMHG | HEIGHT: 60 IN | RESPIRATION RATE: 18 BRPM

## 2019-08-13 PROCEDURE — 76030000002 HC AMB SURG OR TIME FIRST 0.: Performed by: PHYSICAL MEDICINE & REHABILITATION

## 2019-08-13 PROCEDURE — 76210000046 HC AMBSU PH II REC FIRST 0.5 HR: Performed by: PHYSICAL MEDICINE & REHABILITATION

## 2019-08-13 PROCEDURE — 77030003665 HC NDL SPN BBMI -A: Performed by: PHYSICAL MEDICINE & REHABILITATION

## 2019-08-13 PROCEDURE — 72020 X-RAY EXAM OF SPINE 1 VIEW: CPT

## 2019-08-13 PROCEDURE — 74011636320 HC RX REV CODE- 636/320: Performed by: PHYSICAL MEDICINE & REHABILITATION

## 2019-08-13 PROCEDURE — 74011250636 HC RX REV CODE- 250/636: Performed by: PHYSICAL MEDICINE & REHABILITATION

## 2019-08-13 PROCEDURE — 76000 FLUOROSCOPY <1 HR PHYS/QHP: CPT

## 2019-08-13 RX ORDER — BUPIVACAINE HYDROCHLORIDE 5 MG/ML
5 INJECTION, SOLUTION EPIDURAL; INTRACAUDAL ONCE
Status: DISCONTINUED | OUTPATIENT
Start: 2019-08-13 | End: 2019-08-13 | Stop reason: HOSPADM

## 2019-08-13 RX ORDER — METHYLPREDNISOLONE ACETATE 40 MG/ML
40 INJECTION, SUSPENSION INTRA-ARTICULAR; INTRALESIONAL; INTRAMUSCULAR; SOFT TISSUE ONCE
Status: COMPLETED | OUTPATIENT
Start: 2019-08-13 | End: 2019-08-13

## 2019-08-13 RX ORDER — LIDOCAINE HYDROCHLORIDE 20 MG/ML
8 INJECTION, SOLUTION INFILTRATION; PERINEURAL ONCE
Status: COMPLETED | OUTPATIENT
Start: 2019-08-13 | End: 2019-08-13

## 2019-08-13 NOTE — DISCHARGE INSTRUCTIONS
Dr. Mary Henderson Discharge Instructions  Transforaminal Epidural Steroid Injection/ Selective Nerve Block    You had a transforaminal epidural steroid injection/ selective nerve block today. You will probably have some numbness, and possibly weakness, in your leg for the next 6 to 8 hours. The steroids will slowly become effective, reducing your pain, over the next 2 weeks. You should begin feeling better after a few days, but it may take up to 2 weeks to notice the difference. The benefit you get from your injection will last a variable amount of time, depending on the severity of your lumbar spine problem.  Pain: Most people do not have any increase in pain after this injection. However, you might experience some soreness in your low back. If this happens, putting an ice pack over the sore area will help.  Bandage: You will have a small bandage covering the site of the injection. You may remove it once you get home.  Restrictions: Someone should drive you home after the injection. After that, you have no restrictions. You need to be careful while walking, as you may still have some numbness or weakness in your leg. You may resume your normal level of activity. You may take a shower or bath, and you may eat normally. You should continue your current exercises and/or therapy routine.   Medications: Continue your current medications as prescribed. If your pain decreases, you may reduce the amount of your pain medicines. If you stopped taking anticoagulants or blood-thinners before the injection, start them tomorrow. If you have diabetes, your blood sugar may be elevated for a few days. Call your primary doctor with any questions.   Call Dr. Mary Henderson at [phone] if you experience:   Fever (101 degrees Fahrenheit or greater)   Nausea or vomiting   Headache unrelieved by your normal pain medicine   Redness or swelling at the injection site that lasts more than 1 day   New numbness, tingling, weakness, or pain that you didnt have before the injection    Follow-up appointment:   If still having pain in 1-2 weeks, call office at [phone] for a follow up appointment. DISCHARGE SUMMARY from Nurse    The following personal items collected during your admission are returned to you:   Dental Appliance: Dental Appliances: None  Vision: Visual Aid: Glasses  Hearing Aid:    Jewelry:    Clothing:    Other Valuables:    Valuables sent to safe: If you were given prescriptions, please review the written information on prescribed medications. · You will receive a Post Operative Call from one of the Recovery Room Nurses on the day after your surgery to check on you. It is very important for us to know how you are recovering after your surgery. · You may receive an e-mail or letter in the mail from [facility] regarding your experience with us in the Ambulatory Surgery Unit. Your feedback is valuable to us and we appreciate your participation in the survey. If you have not had your influenza or pneumococcal vaccines, please follow up with your primary care physician. The discharge information has been reviewed with the patient. The patient verbalized understanding.

## 2019-08-13 NOTE — OP NOTES
Epidural Steroid Injection Operative Report    Indications: This is a 72 y.o. female who presents with low back pain. She was positive for LS DDD. The patient was admitted for surgery as conservative measures have failed. Date of Surgery: 8/13/2019    Preoperative Diagnosis: LS DDD    Postoperative Diagnosis: LS DDD    Surgeon(s) and Role:     * Abdulkadir Sue MD - Primary     Procedure:  Procedure(s):  RIGHT L4,L5 TRANSFORAMINAL EPIDURAL STEROID INJECTION    Procedure in Detail:  After appropriate informed consent was obtained, the patient was taken to the operating suite and placed in the prone position on the operating table on appropriate padding. The LS region was prepped and draped in the usual sterile fashion. Intraoperative fluoroscopy was used to localize the LS spine. The skin was infiltrated with 2% lidocaine. An 22-g needle was advanced into the Right L4 and L5 neuroforamen under fluoroscopic guidance. A small amount of contrast was injected into the epidural space, confirming appropriate needle placement on fluoroscopy. Next, 2ml of 2% lidocaine and 80mg of Depo-Medrol were injected. The needle was removed from the patient. The patient was then turned back into the supine position on the stretcher and was taken to the Recovery Room in stable condition.     Estimated Blood Loss:  none     Specimens: None       Drains: None          Complications:  None    Signed By: Lisbeth Posadas MD                        August 13, 2019

## 2019-08-13 NOTE — PERIOP NOTES
Sofya Talberto  1945  [id number]    Situation:  Verbal report given from: LAYO Mahan RN  Procedure: Procedure(s):  RIGHT L4,L5 TRANSFORAMINAL EPIDURAL STEROID INJECTION    Background:    Preoperative diagnosis: DEGENERATIVE DISC DISEASE    Postoperative diagnosis: DEGENERATIVE [place] DISEASE    :  Dr. Solomon Ellsworth    Assistant(s): Circ-1: Guru Leslie RN  Local Nurse Monitor: Dillon Gandhi RN  Surg Asst-1: Jeronimo Molina    Specimens: * No specimens in log *    Assessment:  Intra-procedure medications         Anesthesia gave intra-procedure sedation and medications, see anesthesia flow sheet     Intravenous fluids: LR@ KVO     Vital signs stable

## 2019-08-13 NOTE — PERIOP NOTES
1603: Patient received to PACU, VSS. Patient injection site to back intact. Neuro:  Push/Pull assessment:     LUE Response: strong   LLE Response: strong   RUE Response: strong   RLE Response: strong    Discharge instructions given. Patient verbalizes understanding of instructions. Patient states ready for discharge. Patient discharged at this time by wheelchair with belongings, daughter-in-law to provide transportation home.

## 2019-08-13 NOTE — H&P
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 Visit Vitals  Ht 5' (1.524 m)   Wt 101.6 kg (224 lb)   BMI 43.75 kg/m²     PE:  Gen: NAD  Head: normocephalic  Heart: RRR  Lungs: CTA raymond  Abd: NT, ND, soft  Neuro: awake and alert  Skin: intact    IMPRESSION:   LS DDD    Note:  The clinical status was discussed in detail with the patient. The procedure was again discussed and described in detail. All understand and accept the planned procedure and risks; reject other forms of treatment. All questions are answered.     Lisa Ocampo MD

## 2019-08-13 NOTE — PERIOP NOTES
Neuro:  Push/Pull assessment:     LUE Response: equal   LLE Response: equal   RUE Response: equal   RLE Response: equal

## 2019-08-19 ENCOUNTER — OFFICE VISIT (OUTPATIENT)
Dept: INTERNAL MEDICINE CLINIC | Facility: CLINIC | Age: 74
End: 2019-08-19

## 2019-08-19 VITALS
DIASTOLIC BLOOD PRESSURE: 69 MMHG | WEIGHT: 237.1 LBS | BODY MASS INDEX: 46.55 KG/M2 | SYSTOLIC BLOOD PRESSURE: 111 MMHG | TEMPERATURE: 98.2 F | HEART RATE: 63 BPM | RESPIRATION RATE: 16 BRPM | OXYGEN SATURATION: 97 % | HEIGHT: 60 IN

## 2019-08-19 DIAGNOSIS — Z12.31 ENCOUNTER FOR SCREENING MAMMOGRAM FOR BREAST CANCER: ICD-10-CM

## 2019-08-19 DIAGNOSIS — I10 ESSENTIAL HYPERTENSION: ICD-10-CM

## 2019-08-19 DIAGNOSIS — Z00.00 MEDICARE ANNUAL WELLNESS VISIT, SUBSEQUENT: Primary | ICD-10-CM

## 2019-08-19 DIAGNOSIS — Z71.89 ADVANCED DIRECTIVES, COUNSELING/DISCUSSION: ICD-10-CM

## 2019-08-19 DIAGNOSIS — G89.29 ENCOUNTER FOR CHRONIC PAIN MANAGEMENT: ICD-10-CM

## 2019-08-19 DIAGNOSIS — M79.7 FIBROMYALGIA: ICD-10-CM

## 2019-08-19 NOTE — PATIENT INSTRUCTIONS
Preventing Falls: Care Instructions  Your Care Instructions    Getting around your home safely can be a challenge if you have injuries or health problems that make it easy for you to fall. Loose rugs and furniture in walkways are among the dangers for many older people who have problems walking or who have poor eyesight. People who have conditions such as arthritis, osteoporosis, or dementia also have to be careful not to fall. You can make your home safer with a few simple measures. Follow-up care is a key part of your treatment and safety. Be sure to make and go to all appointments, and call your doctor if you are having problems. It's also a good idea to know your test results and keep a list of the medicines you take. How can you care for yourself at home? Taking care of yourself  · You may get dizzy if you do not drink enough water. To prevent dehydration, drink plenty of fluids, enough so that your urine is light yellow or clear like water. Choose water and other caffeine-free clear liquids. If you have kidney, heart, or liver disease and have to limit fluids, talk with your doctor before you increase the amount of fluids you drink. · Exercise regularly to improve your strength, muscle tone, and balance. Walk if you can. Swimming may be a good choice if you cannot walk easily. · Have your vision and hearing checked each year or any time you notice a change. If you have trouble seeing and hearing, you might not be able to avoid objects and could lose your balance. · Know the side effects of the medicines you take. Ask your doctor or pharmacist whether the medicines you take can affect your balance. Sleeping pills or sedatives can affect your balance. · Limit the amount of alcohol you drink. Alcohol can impair your balance and other senses. · Ask your doctor whether calluses or corns on your feet need to be removed.  If you wear loose-fitting shoes because of calluses or corns, you can lose your balance and fall. · Talk to your doctor if you have numbness in your feet. Preventing falls at home  · Remove raised doorway thresholds, throw rugs, and clutter. Repair loose carpet or raised areas in the floor. · Move furniture and electrical cords to keep them out of walking paths. · Use nonskid floor wax, and wipe up spills right away, especially on ceramic tile floors. · If you use a walker or cane, put rubber tips on it. If you use crutches, clean the bottoms of them regularly with an abrasive pad, such as steel wool. · Keep your house well lit, especially [place], and outside walkways. Use night-lights in areas such as hallways and bathrooms. Add extra light switches or use remote switches (such as switches that go on or off when you clap your hands) to make it easier to turn lights on if you have to get up during the night. · Install sturdy handrails on stairways. · Move items in your cabinets so that the things you use a lot are on the lower shelves (about waist level). · Keep a cordless phone and a flashlight with new batteries by your bed. If possible, put a phone in each of the main rooms of your house, or carry a cell phone in case you fall and cannot reach a phone. Or, you can wear a device around your neck or wrist. You push a button that sends a signal for help. · Wear low-heeled shoes that fit well and give your feet good support. Use footwear with nonskid soles. Check the heels and soles of your shoes for wear. Repair or replace worn heels or soles. · Do not wear socks without shoes on wood floors. · Walk on the grass when the sidewalks are slippery. If you live in an area that gets snow and ice in the winter, sprinkle salt on slippery steps and sidewalks. Preventing falls in the bath  · Install grab bars and nonskid mats inside and outside your shower or tub and near the toilet and sinks. · Use shower chairs and bath benches.   · Use a hand-held shower head that will allow you to sit while showering. · Get into a tub or shower by putting the weaker leg in first. Get out of a tub or shower with your strong side first.  · Repair loose toilet seats and consider installing a raised toilet seat to make getting on and off the toilet easier. · Keep your bathroom door unlocked while you are in the shower. Where can you learn more? Go to http://sarah-ingris.info/. Enter [phone] [id number] [phone] in the search box to learn more about \"Preventing Falls: Care Instructions. \"  Current as of: November 7, 2018  Content Version: 12.1  © 3196-4695 [employer]. Care instructions adapted under license by [employer] (which disclaims liability or warranty for this information). If you have questions about a medical condition or this instruction, always ask your healthcare professional. [place] [address] any warranty or liability for your use of this information. Medicare Wellness Visit, Female     The best way to live healthy is to have a lifestyle where you eat a well-balanced diet, exercise regularly, limit alcohol use, and quit all forms of tobacco/nicotine, if applicable. Regular preventive services are another way to keep healthy. Preventive services (vaccines, screening tests, monitoring & exams) can help personalize your care plan, which helps you manage your own care. Screening tests can find health problems at the earliest stages, when they are easiest to treat. [facility] follows the current, evidence-based guidelines published by the [place] (USPSTF) when recommending preventive services for our patients. Because we follow these guidelines, sometimes recommendations change over time as research supports it. (For example, mammograms used to be recommended annually.  Even though Medicare will still pay for an annual mammogram, the newer guidelines recommend a mammogram every two years for women of average risk.)  Of course, you and your doctor may decide to screen more often for some diseases, based on your risk and your health status. Preventive services for you include:  - Medicare offers their members a free annual wellness visit, which is time for you and your primary care provider to discuss and plan for your preventive service needs. Take advantage of this benefit every year!  -All adults over the age of 72 should receive the recommended pneumonia vaccines. Current USPSTF guidelines recommend a series of two vaccines for the best pneumonia protection.   -All adults should have a flu vaccine yearly and a tetanus vaccine every 10 years. All adults age 61 and older should receive a shingles vaccine once in their lifetime.    -A bone mass density test is recommended when a woman turns 65 to screen for osteoporosis. This test is only recommended one time, as a screening. Some providers will use this same test as a disease monitoring tool if you already have osteoporosis. -All adults age 38-68 who are overweight should have a diabetes screening test once every three years.   -Other screening tests and preventive services for persons with diabetes include: an eye exam to screen for diabetic retinopathy, a kidney function test, a foot exam, and stricter control over your cholesterol.   -Cardiovascular screening for adults with routine risk involves an electrocardiogram (ECG) at intervals determined by your doctor.   -Colorectal cancer screenings should be done for adults age 54-65 with no increased risk factors for colorectal cancer. There are a number of acceptable methods of screening for this type of cancer. Each test has its own benefits and drawbacks. Discuss with your doctor what is most appropriate for you during your annual wellness visit. The different tests include: colonoscopy (considered the best screening method), a fecal occult blood test, a fecal DNA test, and sigmoidoscopy.   -Breast cancer screenings are recommended every other year for women of normal risk, age 54-69.  -Cervical cancer screenings for women over age 72 are only recommended with certain risk factors.   -All adults born between [facility] should be screened once for Hepatitis C.      Here is a list of your current Health Maintenance items (your personalized list of preventive services) with a due date:  Health Maintenance Due   Topic Date Due    Pneumococcal Vaccine (1 of 2 - PCV13) 10/01/2012    Mammogram  05/11/2019    Annual Well Visit  07/04/2019

## 2019-08-19 NOTE — ACP (ADVANCE CARE PLANNING)
Advance Care Planning    Advance Care Planning (ACP) Provider Conversation Snapshot    Date of ACP Conversation: 08/19/19  Persons included in Conversation:  patient  Length of ACP Conversation in minutes:  <16 minutes (Non-Billable)    Authorized Decision Maker (if patient is incapable of making informed decisions): This person is:   adele Morley and Lexis Khan            For Patients with Decision Making Capacity:   Values/Goals: Exploration of values, goals, and preferences if recovery is not expected, even with continued medical treatment in the event of:  Imminent death  Severe, permanent brain injury  \"In these circumstances, what matters most to you? \"  Care focused more on comfort or quality of life.     Conversation Outcomes / Follow-Up Plan:   Recommended completion of Advance Directive form after review of ACP materials and conversation with prospective healthcare agent

## 2019-08-19 NOTE — LETTER
Name:Shirley Martin :1945 MR #:[id number] Provider Nicola Wolfe MD  
*DILU-651* BSMG-491 () Page 1 of 5 Initial [employer] CONTROLLED SUBSTANCE AGREEMENT I may be prescribed medications that are controlled substances as part  of my treatment plan for management of my medical condition(s). The goal of my treatment plan is to maintain and/or improve my health and wellbeing. Because controlled substances have an increased risk of abuse or harm, continual re-evaluation is needed determine if the goals of my treatment plan are being met for my safety and the safety of others. Luis Alfredo Munson  am entering into this Controlled Substance Agreement with my provider, Heena Uriarte MD at [address] [place] [address] . I understand that successful treatment requires mutual trust and honesty between me and my provider. I understand that there are state and federal laws and regulations which apply to the medications that my provider may prescribe that must be followed. I understand there are risks and benefits ts of taking the medicines that my provider may prescribe. I understand and agree that following this Agreement is necessary in continuing my provider-patient relationship and success of my treatment plan. As a part of my treatment plan, I agree to the following: COMMUNICATION: 
 
1. I will communicate fully with my provider about my medical condition(s), including the effect on my daily life and how well my medications are helping. I will tell my provider all of the medications that I take for any reason, including medications I receive from another health care provider, and will notify my provider about all issues, problems or concerns, including any side effects, which may be related to my medications. I understand that this information allows my provider to adjust my treatment plan to help manage my medical condition.  I understand that this information will become part of my permanent medical record. 2. I will notify my provider if I have a history of alcohol/drug misuse/addiction or if I have had treatment for alcohol/drug addiction in the past, or if I have a new problem with or concern about alcohol/drug use/addiction, because this increases the likelihood of high risk behaviors and may lead to serious medical conditions. 3. Females Only: I will notify my provider if I am or become pregnant, or if I intend to become pregnant, or if I intend to breastfeed. I understand that communication of these issues with my provider is important, due to possible effects my medication could have on an unborn fetus or breastfeeding child. Name:.Rosa Pandey :1945 MR #:[id number] Provider Kleber Villalta MD  
*KVIK-008* BSMG-491 () Page 2 of 5 Initial [employer] MISUSE OF MEDICATIONS / DRUGS: 
 
1. I agree to take all controlled substances as prescribed, and will not misuse or abuse any controlled substances prescribed by my provider. For my safety, I will not increase the amount of medicine I take without first talking with and getting permission from my provider. 2. If I have a medical emergency, another health care provider may prescribe me medication. If I seek emergency treatment, I will notify my provider within seventy-two (72) hours. 3. I understand that my provider may discuss my use and/or possible misuse/abuse of controlled substances and alcohol, as appropriate, with any health care provider involved in my care, pharmacist or legal authority. ILLEGAL DRUGS: 
 
1. I will not use illegal drugs of any kind, including but not limited to marijuana, heroin, cocaine, or any prescription drug which is not prescribed to me. DRUG DIVERSION / PRESCRIPTION FRAUD: 
 
1. I will not share, sell, trade, give away, or otherwise misuse my prescriptions or medications. 2. I will not alter any prescriptions provided to me by my provider. SINGLE PROVIDER: 
 
1. I agree that all controlled substances that I take will be prescribed only by my provider (or his/her covering provider) under this Agreement. This agreement does not prevent me from seeking emergency medical treatment or receiving pain management related to a surgery. PROTECTING MEDICATIONS: 
 
1. I am responsible for keeping my prescriptions and medications in a safe and secure place including safeguarding them from loss or theft. I understand that lost, stolen or damaged/destroyed prescriptions or medications will not be replaced. Name:.Rosa Contreras :1945 MR #:[id number] Provider Anderson Shaikh MD  
*WCUG-667* BSMG-491 () Page 3 of 5 Initial [employer] PRESCRIPTION RENEWALS/REFILLS: 
 
1. I will follow my controlled substance medication schedule as prescribed by my provider. 2. I understand and agree that I will make any requests for renewals or refills of my prescriptions only at the time of an office visit or during my providers regular office hours subject to the prescription refill requirements of the individual practice. 3. I understand that my provider may not call in prescriptions for controlled substances to my pharmacy. 4. I understand that my provider may adjust or discontinue these medications as deemed appropriate for my medical treatment plan. This Agreement does not guarantee the prescription of controlled medications. 5. I agree that if my medications are adjusted or discontinued, I will properly dispose of any remaining medications. I understand that I will be required to dispose of any remaining controlled medications prior to being provided with any prescriptions for other controlled medications.  
 
 
1. I authorize my provider and my pharmacy to cooperate fully with any local, state, or federal law enforcement agency in the investigation of any possible misuse, sale, or other diversion of my controlled substance prescriptions or medications. RISKS: 
 
 
1. I understand that if I do not adhere to this Agreement in any way, my provider may change my prescriptions, stop prescribing controlled substances or end our provider-patient relationship. 2. If my provider decides to stop prescribing medication, or decides to end our provider-patient relationship,my provider may require that I taper my medications slowly. If necessary, my provider may also provide a prescription for other medications to treat my withdrawal symptoms. UNDERSTANDING THIS AGREEMENT: 
 
I understand that my provider may adjust or stop my prescriptions for controlled substances based on my medical condition and my treatment plan. I understand that this Agreement does not guarantee that I will be prescribed medications or controlled substances. I understand that controlled substances may be just one part 
of my treatment plan. My initial on each page and my signature below shows that I have read each page of this Agreement, I have had an opportunity to ask questions, and all of my questions have been answered to my satisfaction by my provider. By signing below, I agree to comply with this Agreement, and I understand that if I do not follow the Agreements listed above, my provider may stop 
 
 
 
_________________________________________  Date/Time 8/19/2019 2:22 PM   
             (Patient Signature)

## 2019-08-19 NOTE — PROGRESS NOTES
HPI  Ms. Jeffrey Cervantes is a 72y.o. year old female, she is seen today for follow up HTN, fibromyalgia. Followed by ortho for DDD lumbar spine, had recent URIEL and PT and both helped back pain. Fibromyalgia pain worse in extreme heat - takes lyrica which helps but doesn't take daily - only with extreme flares of pain. Knows that being on her feet will trigger pain. Pain in low back, sometimes down thighs laterally. Rarely takes tramadol - can't remember last time she took it. Occasionally will fee lightheaded, no sob. Occasional fleeting quick chest pain. Not associated with activity. Chief Complaint   Patient presents with    Blood Pressure Check     Room 2B// cortisone shot last Tues @Ortho // NON fasting     Fibromyalgia    Annual Wellness Visit        Prior to Admission medications    Medication Sig Start Date End Date Taking? Authorizing Provider   lisinopril (PRINIVIL, ZESTRIL) 10 mg tablet Take 1 Tab by mouth daily. 7/31/19  Yes Clarke Ramsey MD   traMADol Marlyce Devika) 50 mg tablet Take 1 Tab by mouth every six (6) hours as needed for Pain for up to 30 days. Max Daily Amount: 200 mg. 6/28/19 8/19/19 Yes Clarke Ramsey MD   furosemide (LASIX) 40 mg tablet TAKE 1/2 (ONE-HALF) TABLET BY MOUTH ONCE DAILY AS NEEDED 8/27/18  Yes Clarke Ramsey MD   pregabalin (LYRICA) 75 mg capsule TAKE ONE CAPSULE BY MOUTH ONCE DAILY MAX  75  MG  PER/DAY 7/3/18  Yes Clarke Ramsey MD   cetirizine (ZYRTEC) 10 mg tablet Take 5 mg by mouth daily as needed. Yes Provider, Historical   acetaminophen (TYLENOL) 325 mg tablet Take  by mouth every four (4) hours as needed for Pain. Yes Provider, Historical   naproxen sodium (ALEVE) 220 mg tablet Take 220 mg by mouth as needed. Provider, Historical   naproxen (NAPROSYN) 500 mg tablet Take 1 Tab by mouth daily as needed.  8/17/18   Porfirio Diehl, NP         No Known Allergies      REVIEW OF SYSTEMS:  Per HPI    PHYSICAL EXAM:  Visit Vitals  /69 (BP 1 Location: Left arm, BP Patient Position: Sitting)   Pulse 63   Temp 98.2 °F (36.8 °C) (Oral)   Resp 16   Ht 5' (1.524 m)   Wt 237 lb 1.6 oz (107.5 kg)   SpO2 97%   BMI 46.31 kg/m²     Constitutional: Appears well-developed and well-nourished. No distress. HENT:   Head: Normocephalic and atraumatic. Eyes: No scleral icterus. Cardiovascular: Normal S1/S2, regular rhythm. No murmurs, rubs, or gallops. Pulmonary/Chest: Effort normal and breath sounds normal. No respiratory distress. No wheezes, rhonchi, or rales. Back: mild palpation paraspinal muscles lower spine  MSK: multiple tender points b/l thighs, ankles  Ext: No edema. Neurological: Alert. Psychiatric: Normal mood and affect. Behavior is normal.     Lab Results   Component Value Date/Time    Sodium 140 05/06/2019 03:09 PM    Potassium 5.5 (H) 05/06/2019 03:09 PM    Chloride 106 05/06/2019 03:09 PM    CO2 21 05/06/2019 03:09 PM    Anion gap 7 05/16/2010 09:05 AM    Glucose 86 05/06/2019 03:09 PM    BUN 40 (H) 05/06/2019 03:09 PM    Creatinine 1.12 (H) 05/06/2019 03:09 PM    BUN/Creatinine ratio 36 (H) 05/06/2019 03:09 PM    GFR est AA 57 (L) 05/06/2019 03:09 PM    GFR est non-AA 50 (L) 05/06/2019 03:09 PM    Calcium 9.7 05/06/2019 03:09 PM    Bilirubin, total 0.2 05/06/2019 03:09 PM    AST (SGOT) 14 05/06/2019 03:09 PM    Alk. phosphatase 158 (H) 05/06/2019 03:09 PM    Protein, total 6.8 05/06/2019 03:09 PM    Albumin 4.4 05/06/2019 03:09 PM    Globulin 3.7 05/16/2010 09:05 AM    A-G Ratio 1.8 05/06/2019 03:09 PM    ALT (SGPT) 15 05/06/2019 03:09 PM     No results found for: HBA1C, HGBE8, HQA8LVYZ, DWU1SDQG   Lab Results   Component Value Date/Time    Cholesterol, total 161 05/18/2017 11:18 AM    HDL Cholesterol 51 05/18/2017 11:18 AM    LDL, calculated 90 05/18/2017 11:18 AM    VLDL, calculated 20 05/18/2017 11:18 AM    Triglyceride 100 05/18/2017 11:18 AM          ASSESSMENT/PLAN  Diagnoses and all orders for this visit:    1.  Medicare annual wellness visit, subsequent    2. Essential hypertension  -     METABOLIC PANEL, COMPREHENSIVE  Controlled on current regimen, continue   3. Fibromyalgia  Stable - continue lyrica and rare tramadol - cannot take nsaids routinely with CKD  4. Encounter for chronic pain management  -     COMPLIANCE DRUG SCREEN/PRESCRIPTION MONITORING    5. Advanced directives, counseling/discussion    6. Encounter for screening mammogram for breast cancer  -     YANIRA MAMMO BI SCREENING INCL CAD; Future          Health Maintenance Due   Topic Date Due    Pneumococcal 65+ years (1 of 2 - PCV13) 10/01/2012    BREAST CANCER SCRN MAMMOGRAM  05/11/2019    MEDICARE YEARLY EXAM  07/04/2019        Follow-up and Dispositions    · Return in about 4 months (around 12/19/2019) for bp, pain. Reviewed plan of care. Patient has provided input and agrees with goals. The nurse provided the patient and/or family with advanced directive information if needed and encouraged the patient to provide a copy to the office when available. This is the Subsequent Medicare Annual Wellness Exam, performed 12 months or more after the Initial AWV or the last Subsequent AWV    I have reviewed the patient's medical history in detail and updated the computerized patient record. History     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 type    Fibromyalgia     Hypertension       Past Surgical History:   Procedure Laterality Date    HX DILATION AND CURETTAGE      HX TONSILLECTOMY      as a child     Current Outpatient Medications   Medication Sig Dispense Refill    lisinopril (PRINIVIL, ZESTRIL) 10 mg tablet Take 1 Tab by mouth daily. 90 Tab 5    traMADol (ULTRAM) 50 mg tablet Take 1 Tab by mouth every six (6) hours as needed for Pain for up to 30 days.  Max Daily Amount: 200 mg. 30 Tab 0    furosemide (LASIX) 40 mg tablet TAKE 1/2 (ONE-HALF) TABLET BY MOUTH ONCE DAILY AS NEEDED 15 Tab 1    pregabalin (LYRICA) 75 mg capsule TAKE ONE CAPSULE BY MOUTH ONCE DAILY MAX  75  MG  PER/DAY 30 Cap 1    cetirizine (ZYRTEC) 10 mg tablet Take 5 mg by mouth daily as needed.  acetaminophen (TYLENOL) 325 mg tablet Take  by mouth every four (4) hours as needed for Pain.  naproxen sodium (ALEVE) 220 mg tablet Take 220 mg by mouth as needed.  naproxen (NAPROSYN) 500 mg tablet Take 1 Tab by mouth daily as needed. 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 Functional Ability and Level of Safety:   Hearing Loss  Hearing is good. Activities of Daily Living  The home contains: [place]  Patient does total self care    Fall Risk  Fall Risk Assessment, last 12 mths 8/19/2019   Able to walk? Yes   Fall in past 12 months? Yes   Fall with injury?  No   Number of falls in past 12 months 2   Fall Risk Score 2       Abuse Screen  Patient is not abused    Cognitive Screening   Evaluation of Cognitive Function:  Has your family/caregiver stated any concerns about your memory: no  Normal    Patient Care Team   Patient Care Team:  Cruzito Ochoa MD as PCP - General (Internal Medicine)  Lindsey Alcantara MD as Physician (Nephrology)    Assessment/Plan   Education and counseling provided:  Are appropriate based on today's review and evaluation    Diagnoses and all orders for this visit:    1. Medicare annual wellness visit, subsequent    2. Essential hypertension  -     METABOLIC PANEL, COMPREHENSIVE    3. Fibromyalgia    4. Encounter for chronic pain management  -     COMPLIANCE DRUG SCREEN/PRESCRIPTION MONITORING    5. Advanced directives, counseling/discussion    6. Encounter for screening mammogram for breast cancer  -     YANIRA MAMMO BI SCREENING INCL CAD;  Future        Health Maintenance Due   Topic Date Due    Pneumococcal 65+ years (1 of 2 - PCV13) 10/01/2012    BREAST CANCER SCRN MAMMOGRAM  05/11/2019    MEDICARE YEARLY EXAM  07/04/2019

## 2019-08-19 NOTE — PROGRESS NOTES
Yoselin Anand  Identified pt with two pt identifiers(name and ). Chief Complaint   Patient presents with    Blood Pressure Check     Room 2B// cortisone shot last Tues @Ortho // NON fasting     Fibromyalgia    Annual Wellness Visit       1. Have you been to the ER, urgent care clinic since your last visit? Hospitalized since your last visit? NO    2. Have you seen or consulted any other health care providers outside of the [address] [place] [address] since your last visit? Include any pap smears or colon screening. NO      Provider notified of reason for visit, vitals and flowsheets obtained on patients.      Patient received paperwork for advance directive during previous visit but has not completed at this time     Reviewed record In preparation for visit, huddled with provider and have obtained necessary documentation      Health Maintenance Due   Topic    Pneumococcal 65+ years (1 of 2 - PCV13)    BREAST CANCER SCRN MAMMOGRAM     MEDICARE YEARLY EXAM        Wt Readings from Last 3 Encounters:   19 237 lb 1.6 oz (107.5 kg)   19 237 lb (107.5 kg)   19 234 lb (106.1 kg)     Temp Readings from Last 3 Encounters:   19 98.1 °F (36.7 °C)   19 98.1 °F (36.7 °C) (Oral)   19 98.1 °F (36.7 °C) (Oral)     BP Readings from Last 3 Encounters:   19 104/75   19 111/69   19 102/62     Pulse Readings from Last 3 Encounters:   19 67   19 63   19 62     Vitals:    19 1427   Resp: 16   Weight: 237 lb 1.6 oz (107.5 kg)   Height: 5' (1.524 m)   PainSc:   3   PainLoc: Generalized         Learning Assessment:  :     Learning Assessment 2019   PRIMARY LEARNER Patient   BARRIERS PRIMARY LEARNER NONE   CO-LEARNER CAREGIVER No   PRIMARY LANGUAGE ENGLISH   LEARNER PREFERENCE PRIMARY DEMONSTRATION   ANSWERED BY self   RELATIONSHIP SELF       Depression Screening:  :     3 most recent PHQ Screens 2019   Little interest or pleasure in doing things Not at all   Feeling down, depressed, irritable, or hopeless Not at all   Total Score PHQ 2 0       Fall Risk Assessment:  :     Fall Risk Assessment, last 12 mths 8/19/2019   Able to walk? Yes   Fall in past 12 months? Yes   Fall with injury? No   Number of falls in past 12 months 2   Fall Risk Score 2       Abuse Screening:  :     Abuse Screening Questionnaire 8/19/2019 7/3/2018 5/17/2018   Do you ever feel afraid of your partner? N N N   Are you in a relationship with someone who physically or mentally threatens you? N N N   Is it safe for you to go home? Y Y Y       ADL Screening:  :     ADL Assessment 7/3/2018   Feeding yourself No Help Needed   Getting from bed to chair No Help Needed   Getting dressed No Help Needed   Bathing or showering No Help Needed   Walk across the room (includes cane/walker) No Help Needed   Using the telphone No Help Needed   Taking your medications No Help Needed   Preparing meals No Help Needed   Managing money (expenses/bills) No Help Needed   Moderately strenuous housework (laundry) No Help Needed   Shopping for personal items (toiletries/medicines) No Help Needed   Shopping for groceries No Help Needed   Driving No Help Needed   Climbing a flight of stairs No Help Needed   Getting to places beyond walking distances No Help Needed         Medication reconciliation up to date and corrected with patient at this time.

## 2019-08-22 LAB
ALBUMIN SERPL-MCNC: 4.6 G/DL (ref 3.5–4.8)
ALBUMIN/GLOB SERPL: 2 {RATIO} (ref 1.2–2.2)
ALP SERPL-CCNC: 110 IU/L (ref 39–117)
ALT SERPL-CCNC: 15 IU/L (ref 0–32)
AST SERPL-CCNC: 15 IU/L (ref 0–40)
BILIRUB SERPL-MCNC: 0.2 MG/DL (ref 0–1.2)
BUN SERPL-MCNC: 38 MG/DL (ref 8–27)
BUN/CREAT SERPL: 33 (ref 12–28)
CALCIUM SERPL-MCNC: 9.3 MG/DL (ref 8.7–10.3)
CHLORIDE SERPL-SCNC: 99 MMOL/L (ref 96–106)
CO2 SERPL-SCNC: 24 MMOL/L (ref 20–29)
CREAT SERPL-MCNC: 1.15 MG/DL (ref 0.57–1)
DRUGS UR: NORMAL
GLOBULIN SER CALC-MCNC: 2.3 G/DL (ref 1.5–4.5)
GLUCOSE SERPL-MCNC: 83 MG/DL (ref 65–99)
POTASSIUM SERPL-SCNC: 5.1 MMOL/L (ref 3.5–5.2)
PROT SERPL-MCNC: 6.9 G/DL (ref 6–8.5)
SODIUM SERPL-SCNC: 139 MMOL/L (ref 134–144)

## 2019-08-23 ENCOUNTER — TELEPHONE (OUTPATIENT)
Dept: INTERNAL MEDICINE CLINIC | Facility: CLINIC | Age: 74
End: 2019-08-23

## 2019-09-04 DIAGNOSIS — J30.89 NON-SEASONAL ALLERGIC RHINITIS, UNSPECIFIED TRIGGER: ICD-10-CM

## 2019-09-04 RX ORDER — FLUTICASONE PROPIONATE 50 MCG
SPRAY, SUSPENSION (ML) NASAL
Qty: 1 BOTTLE | Refills: 8 | Status: SHIPPED | OUTPATIENT
Start: 2019-09-04 | End: 2019-12-16

## 2019-09-24 RX ORDER — FUROSEMIDE 40 MG/1
TABLET ORAL
Qty: 15 TAB | Refills: 1 | Status: SHIPPED | OUTPATIENT
Start: 2019-09-24 | End: 2019-12-27 | Stop reason: SDUPTHER

## 2019-10-14 ENCOUNTER — TELEPHONE (OUTPATIENT)
Dept: INTERNAL MEDICINE CLINIC | Facility: CLINIC | Age: 74
End: 2019-10-14

## 2019-10-14 NOTE — TELEPHONE ENCOUNTER
----- Message from Deep Dubose sent at 10/14/2019  9:26 AM EDT -----  Regarding: Dr. Lagunas Necessary Refill  Contact: [phone]  [address] [place] [address] (if not patient):  Pt.  Relationship of caller (if not patient): n/a  Best contact number(s): [phone]  Name of medication and dosage if known: ( Benzonatate 100 mg zyd)  Is patient out of this medication (yes/no): Yes  Pharmacy name: [facility] in [address] [place] [address] listed in chart? (yes/no): Yes  Pharmacy phone number: in chart. Date of last visit:08/19/19  Details to clarify the request: Pt stated she can't sleep due to persisting cough. Pt stated Dr. Stefano Santillan prescribed her with medication previously and requesting a refill called in to pharmacy.

## 2019-11-06 NOTE — PERIOP NOTES
[facility]  Ambulatory Surgery Unit  Pre-operative Instructions    Procedure Date 11/12/19            Tentative Arrival Time 0916      1. On the day of your procedure, please report to the Ambulatory Surgery Unit Registration Desk and sign in at your designated time. The Ambulatory Surgery Unit is located in [facility] on the [facility] side of the [facility] across from the [address] [place] [address]. Please have all of your health insurance cards and a photo ID. 2. You must have someone with you to drive you home as directed by your surgeon. 3. You may have a light breakfast and take normal morning medications. 4. We recommend you do not drink any alcoholic beverages for 24 hours before and after your procedure. 5. Contact your surgeons office for instructions on the following medications: non-steroidal anti-inflammatory drugs (i.e. Advil, Aleve), vitamins, and supplements. (Some surgeons will want you to stop these medications prior to surgery and others may allow you to take them)   **If you are currently taking Plavix, Coumadin, Aspirin and/or other blood-thinning agents, contact your surgeon for instructions. ** Your surgeon will partner with the physician prescribing these medications to determine if it is safe to stop or if you need to continue taking. Please do not stop taking these medications without instructions from your surgeon. 6. In an effort to help prevent surgical site infection, we ask that you shower with an anti-bacterial soap (i.e. Dial or Safeguard) on the morning of your procedure. Do not apply any lotions, powders, or deodorants after showering. 7. Wear comfortable clothes. Wear glasses instead of contacts. Do not bring any jewelry or money (other than copays or fees as instructed). Do not wear make-up, particularly mascara, the morning of your procedure. Wear your hair loose or down, no ponytails, buns, chelly pins or clips.  All body piercings must be removed. 8. You should understand that if you do not follow these instructions your procedure may be cancelled. If your physical condition changes (i.e. fever, cold or flu) please contact your surgeon as soon as possible. 9. It is important that you be on time. If a situation occurs where you may be late, or if you have any questions or problems, please call [phone].    10. Your procedure time may be subject to change. You will receive a phone call the day prior to confirm your arrival time. I understand a pre-operative phone call will be made to verify my procedure time. In the event that I am not available, I give permission for a message to be left on my answering service and/or with another person?   [phone]         ___________________      ___________________      ___________________  (Signature of Patient)          (Witness)                   (Date and Time)

## 2019-11-12 ENCOUNTER — APPOINTMENT (OUTPATIENT)
Dept: GENERAL RADIOLOGY | Age: 74
End: 2019-11-12
Attending: PHYSICAL MEDICINE & REHABILITATION
Payer: MEDICARE

## 2019-11-12 ENCOUNTER — HOSPITAL ENCOUNTER (OUTPATIENT)
Age: 74
Setting detail: OUTPATIENT SURGERY
Discharge: HOME | End: 2019-11-12
Attending: PHYSICAL MEDICINE & REHABILITATION | Admitting: PHYSICAL MEDICINE & REHABILITATION
Payer: MEDICARE

## 2019-11-12 VITALS
RESPIRATION RATE: 18 BRPM | TEMPERATURE: 98.2 F | OXYGEN SATURATION: 100 % | SYSTOLIC BLOOD PRESSURE: 158 MMHG | BODY MASS INDEX: 45.94 KG/M2 | DIASTOLIC BLOOD PRESSURE: 66 MMHG | WEIGHT: 234 LBS | HEIGHT: 60 IN | HEART RATE: 59 BPM

## 2019-11-12 PROCEDURE — 77030003665 HC NDL SPN BBMI -A: Performed by: PHYSICAL MEDICINE & REHABILITATION

## 2019-11-12 PROCEDURE — 76000 FLUOROSCOPY <1 HR PHYS/QHP: CPT

## 2019-11-12 PROCEDURE — 74011250636 HC RX REV CODE- 250/636: Performed by: PHYSICAL MEDICINE & REHABILITATION

## 2019-11-12 PROCEDURE — 76030000002 HC AMB SURG OR TIME FIRST 0.: Performed by: PHYSICAL MEDICINE & REHABILITATION

## 2019-11-12 PROCEDURE — 76210000046 HC AMBSU PH II REC FIRST 0.5 HR: Performed by: PHYSICAL MEDICINE & REHABILITATION

## 2019-11-12 PROCEDURE — 74011000250 HC RX REV CODE- 250: Performed by: PHYSICAL MEDICINE & REHABILITATION

## 2019-11-12 PROCEDURE — 72020 X-RAY EXAM OF SPINE 1 VIEW: CPT

## 2019-11-12 PROCEDURE — 74011636320 HC RX REV CODE- 636/320: Performed by: PHYSICAL MEDICINE & REHABILITATION

## 2019-11-12 RX ORDER — BUPIVACAINE HYDROCHLORIDE 5 MG/ML
5 INJECTION, SOLUTION EPIDURAL; INTRACAUDAL ONCE
Status: DISCONTINUED | OUTPATIENT
Start: 2019-11-12 | End: 2019-11-12 | Stop reason: HOSPADM

## 2019-11-12 RX ORDER — METHYLPREDNISOLONE ACETATE 40 MG/ML
40 INJECTION, SUSPENSION INTRA-ARTICULAR; INTRALESIONAL; INTRAMUSCULAR; SOFT TISSUE ONCE
Status: COMPLETED | OUTPATIENT
Start: 2019-11-12 | End: 2019-11-12

## 2019-11-12 RX ORDER — LIDOCAINE HYDROCHLORIDE 20 MG/ML
7 INJECTION, SOLUTION INFILTRATION; PERINEURAL ONCE
Status: COMPLETED | OUTPATIENT
Start: 2019-11-12 | End: 2019-11-12

## 2019-11-12 NOTE — PERIOP NOTES
Leg/foot pushes/pulls are equally strong, bilat. Dc instructions reviewed with patient. Voiced understanding. Denies any questions. States that she is ready for dc, when able. Patient is stable for discharge. Respirations are even, nonlabored. Skin warm, pink, dry. Patient has all of their belongings they came with. Placed in wheelchair and brought to vehicle by staff.

## 2019-11-12 NOTE — H&P
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 Visit Vitals  /67 (BP 1 Location: Right arm, BP Patient Position: At rest)   Pulse 60   Temp 98.2 °F (36.8 °C)   Resp 18   Ht 5' (1.524 m)   Wt 106.1 kg (234 lb)   SpO2 100%   BMI 45.70 kg/m²     PE:  Gen: NAD  Head: normocephalic  Heart: RRR  Lungs: CTA raymond  Abd: NT, ND, soft  Neuro: awake and alert  Skin: intact    IMPRESSION:   LS DDD    Note:  The clinical status was discussed in detail with the patient. The procedure was again discussed and described in detail. All understand and accept the planned procedure and risks; reject other forms of treatment. All questions are answered.     Yobany Rainey MD

## 2019-11-12 NOTE — OP NOTES
Epidural Steroid Injection Operative Report    Indications: This is a 69 y.o. female who presents with low back pain. She was positive for LS DDD. The patient was admitted for surgery as conservative measures have failed. Date of Surgery: 11/12/2019    Preoperative Diagnosis: LS DDD    Postoperative Diagnosis: LS DDD    Surgeon(s) and Role:     * Juve Weiner MD - Primary     Procedure:  Procedure(s):  RIGHT L4-5,L5-S1 TRANSFORAMINAL EPIDURAL STEROID INJECTION    Procedure in Detail:  After appropriate informed consent was obtained, the patient was taken to the operating suite and placed in the prone position on the operating table on appropriate padding. The LS region was prepped and draped in the usual sterile fashion. Intraoperative fluoroscopy was used to localize the LS spine. The skin was infiltrated with 2% lidocaine. An 22-g needle was advanced into the Right L4 and L5 neuroforamen under fluoroscopic guidance. A small amount of contrast was injected into the epidural space, confirming appropriate needle placement on fluoroscopy. Next, 2ml of 2% lidocaine and 80mg of Depo-Medrol were injected. The needle was removed from the patient. The patient was then turned back into the supine position on the stretcher and was taken to the Recovery Room in stable condition.     Estimated Blood Loss:  none     Specimens: None       Drains: None          Complications:  None    Signed By: Evens Durbin MD                        November 12, 2019

## 2019-11-12 NOTE — PERIOP NOTES
[place] [facility]  1945  [id number]    Situation:  Verbal report given from: RN  Procedure: Procedure(s):  RIGHT L4-5,L5-S1 TRANSFORAMINAL EPIDURAL STEROID INJECTION    Background:    Preoperative diagnosis: RIGHT SIDED SCIATICA    Postoperative diagnosis: RIGHT SIDED SCIATICA    :  Dr. Bart Sheppard    Assistant(s): Circ-1: Brandon Clement RN  Local Nurse Monitor: Emily Hollis RN  Surg Asst-1: Albert Da Silva    Specimens: * No specimens in log *    Assessment:  Intra-procedure medications         Anesthesia gave intra-procedure sedation and medications, see anesthesia flow sheet     Intravenous fluids: LR@ KVO     Vital signs stable       Recommendation:    Permission to share finding with daughter in law.

## 2019-11-12 NOTE — PERIOP NOTES
Skin assessment:   WNL   Skin color: normal for ethnicity   Skin condition: small bruise to right arm   Skin integrity: WNL   Turgor: normal     Neuro:  Push/Pull assessment:     LUE Response: strong    LLE Response: moderate push/ pull   RUE Response: strong    RLE Response: moderate push/ pull    OB/Gyn assessment:    LMP: n/a    If no menstrual period then reason: n/a

## 2019-11-27 DIAGNOSIS — M79.7 FIBROMYALGIA: ICD-10-CM

## 2019-11-27 RX ORDER — TRAMADOL HYDROCHLORIDE 50 MG/1
50 TABLET ORAL
Qty: 30 TAB | Refills: 0 | Status: SHIPPED | OUTPATIENT
Start: 2019-11-27 | End: 2020-02-04 | Stop reason: SDUPTHER

## 2019-11-27 NOTE — TELEPHONE ENCOUNTER
[address] faxed in refill request on behalf of the pt for Tramadol 50mg tab (1 tablet by mouth every 6 hours as needed for pain).

## 2019-11-27 NOTE — TELEPHONE ENCOUNTER
REFILL     PCP: Ray Jj MD     Last appt: 8/19/2019   Future Appointments   Date Time Provider Nitish Bob   12/16/2019  3:00 PM Ray Jj  [facility]        Requested Prescriptions     Pending Prescriptions Disp Refills    traMADol (ULTRAM) 50 mg tablet 30 Tab 0     Sig: Take 1 Tab by mouth every six (6) hours as needed for Pain for up to 30 days. Max Daily Amount: 200 mg.

## 2019-11-29 ENCOUNTER — TELEPHONE (OUTPATIENT)
Dept: INTERNAL MEDICINE CLINIC | Facility: CLINIC | Age: 74
End: 2019-11-29

## 2019-11-29 NOTE — TELEPHONE ENCOUNTER
Patient states she has been having right hip pain. She states it is tender to touch. Pain is worse when standing on feet/walking around. She was advised that any new medication considerations would have to wait until she is seen. She wants to know if she needs to see PCP or ortho for this issue. She has upcoming appt with Dr Herlinda Escalante on 12/16.

## 2019-11-29 NOTE — TELEPHONE ENCOUNTER
She has been followed by ortho and this is best for hip pain. If no appt available with them in near future can try ortho on call.

## 2019-11-29 NOTE — TELEPHONE ENCOUNTER
----- Message from Calleen Persons sent at 11/29/2019 11:05 AM EST -----  Regarding: Dr. Dayanara De León / Telephone  Caller's first and last name and relationship to patient (if not the patient): N/A  Best contact number: [phone]  Preferred date and time: Monday after 3:00 pm  Scheduled appointment date and time: 12/16/19 at 3 pm  Reason for appointment: Pain ,To discuss Gabapentin. Details to clarify the request: Pt.  Was unable to make the appt with Dr. Joel Alvarez at 2 pm on Monday

## 2019-12-16 ENCOUNTER — OFFICE VISIT (OUTPATIENT)
Dept: INTERNAL MEDICINE CLINIC | Facility: CLINIC | Age: 74
End: 2019-12-16

## 2019-12-16 VITALS
TEMPERATURE: 98.2 F | RESPIRATION RATE: 14 BRPM | HEART RATE: 72 BPM | OXYGEN SATURATION: 98 % | WEIGHT: 236.4 LBS | HEIGHT: 60 IN | SYSTOLIC BLOOD PRESSURE: 115 MMHG | DIASTOLIC BLOOD PRESSURE: 65 MMHG | BODY MASS INDEX: 46.41 KG/M2

## 2019-12-16 DIAGNOSIS — E66.01 OBESITY, MORBID (HCC): ICD-10-CM

## 2019-12-16 DIAGNOSIS — M79.7 FIBROMYALGIA: ICD-10-CM

## 2019-12-16 DIAGNOSIS — I10 ESSENTIAL HYPERTENSION: Primary | ICD-10-CM

## 2019-12-16 NOTE — PROGRESS NOTES
HPI  Ms. Crow Edwards is a 69y.o. year old female, she is seen today for follow up HTN, chronic pain, fibromyalgia. [facility]. Has been getting TFESI right L4 and L5 with Dr. Florence Mendez. Only takes lyrica when fibromyalgia is worse than usual - only rare use. Takes tramadol also rarely when pain is severe - helps with chronic back pain. Helps with arthritis pain. No chest pain, sob, dizziness, weakness. Working 4 days per week, stays very active. Chief Complaint   Patient presents with    Blood Pressure Check     Room 2A// [facility] last week r/t back pain    Fibromyalgia        Prior to Admission medications    Medication Sig Start Date End Date Taking? Authorizing Provider   traMADol (ULTRAM) 50 mg tablet Take 1 Tab by mouth every six (6) hours as needed for Pain for up to 30 days. Max Daily Amount: 200 mg. 11/27/19 12/27/19 Yes Ray Jj MD   furosemide (LASIX) 40 mg tablet TAKE 1/2 (ONE-HALF) TABLET BY MOUTH ONCE DAILY AS NEEDED 9/24/19  Yes Ray Jj MD   lisinopril (PRINIVIL, ZESTRIL) 10 mg tablet Take 1 Tab by mouth daily. 7/31/19  Yes Ray Jj MD   pregabalin (LYRICA) 75 mg capsule TAKE ONE CAPSULE BY MOUTH ONCE DAILY MAX  75  MG  PER/DAY 7/3/18  Yes Ray Jj MD   acetaminophen (TYLENOL) 325 mg tablet Take  by mouth every four (4) hours as needed for Pain. Yes Provider, Historical   fluticasone propionate (FLONASE) 50 mcg/actuation nasal spray USE 2 SPRAY(S) IN EACH NOSTRIL DAILY FOR  ALLERGIC  RHINITIS 9/4/19 12/16/19  Babs Haines NP   cetirizine (ZYRTEC) 10 mg tablet Take 5 mg by mouth daily as needed.   12/16/19  Provider, Historical         No Known Allergies      REVIEW OF SYSTEMS:  Per HPI    PHYSICAL EXAM:  Visit Vitals  /65 (BP 1 Location: Left arm, BP Patient Position: Sitting)   Pulse 72   Temp 98.2 °F (36.8 °C) (Oral)   Resp 14   Ht 5' (1.524 m)   Wt 236 lb 6.4 oz (107.2 kg)   SpO2 98%   BMI 46.17 kg/m²     Constitutional: Appears well-developed and well-nourished. No distress. HENT:   Head: Normocephalic and atraumatic. Eyes: No scleral icterus. Neck: no lad, no tm, supple   Cardiovascular: Normal S1/S2, regular rhythm. No murmurs, rubs, or gallops. Pulmonary/Chest: Effort normal and breath sounds normal. No respiratory distress. No wheezes, rhonchi, or rales. MSK: slightly tender b/l hips  Ext: No edema. Neurological: Alert. Psychiatric: Normal mood and affect. Behavior is normal.     Lab Results   Component Value Date/Time    Sodium 139 08/19/2019 03:06 PM    Potassium 5.1 08/19/2019 03:06 PM    Chloride 99 08/19/2019 03:06 PM    CO2 24 08/19/2019 03:06 PM    Anion gap 7 05/16/2010 09:05 AM    Glucose 83 08/19/2019 03:06 PM    BUN 38 (H) 08/19/2019 03:06 PM    Creatinine 1.15 (H) 08/19/2019 03:06 PM    BUN/Creatinine ratio 33 (H) 08/19/2019 03:06 PM    GFR est AA 55 (L) 08/19/2019 03:06 PM    GFR est non-AA 48 (L) 08/19/2019 03:06 PM    Calcium 9.3 08/19/2019 03:06 PM    Bilirubin, total 0.2 08/19/2019 03:06 PM    AST (SGOT) 15 08/19/2019 03:06 PM    Alk. phosphatase 110 08/19/2019 03:06 PM    Protein, total 6.9 08/19/2019 03:06 PM    Albumin 4.6 08/19/2019 03:06 PM    Globulin 3.7 05/16/2010 09:05 AM    A-G Ratio 2.0 08/19/2019 03:06 PM    ALT (SGPT) 15 08/19/2019 03:06 PM     No results found for: HBA1C, HGBE8, KGU6EPAK, XLW4GFGT   Lab Results   Component Value Date/Time    Cholesterol, total 161 05/18/2017 11:18 AM    HDL Cholesterol 51 05/18/2017 11:18 AM    LDL, calculated 90 05/18/2017 11:18 AM    VLDL, calculated 20 05/18/2017 11:18 AM    Triglyceride 100 05/18/2017 11:18 AM          ASSESSMENT/PLAN  Diagnoses and all orders for this visit:    1. Essential hypertension  Controlled on current regimen, continue - bmp at follow up  2. Fibromyalgia  With chronic pain - rare use tramadol and lyrica - continue  3.  Obesity, morbid ([address]  Portion control, continue activity for weight loss        Health Maintenance Due   Topic Date Due    Pneumococcal 65+ years (1 of 1 - PPSV23) 10/01/2012    BREAST CANCER SCRN MAMMOGRAM  05/11/2019               Reviewed plan of care. Patient has provided input and agrees with goals. The nurse provided the patient and/or family with advanced directive information if needed and encouraged the patient to provide a copy to the office when available.

## 2019-12-26 NOTE — TELEPHONE ENCOUNTER
----- Message from [facility] sent at 12/26/2019  4:09 PM EST -----  Regarding: Dr. Richi Reid Refill  Contact: [phone]  Medication Refill    Best contact number(s): [phone]      Name of medication and dosage if known: furosemide-20 mg       Is patient out of this medication (yes/no): Yes      Pharmacy name: [facility] listed in chart? (yes/no): Yes  Pharmacy phone number:  [phone]      [facility]

## 2019-12-27 RX ORDER — FUROSEMIDE 40 MG/1
TABLET ORAL
Qty: 15 TAB | Refills: 1 | Status: SHIPPED | OUTPATIENT
Start: 2019-12-27 | End: 2019-12-30

## 2019-12-27 NOTE — TELEPHONE ENCOUNTER
Patient wants lasix 20 mg so she does not have to cut the tablets in half and wants a 90 day prescription. Patient was very upset that she has asked for this before and has not received it.

## 2019-12-30 RX ORDER — FUROSEMIDE 20 MG/1
20 TABLET ORAL
Qty: 90 TAB | Refills: 1 | Status: SHIPPED | OUTPATIENT
Start: 2019-12-30 | End: 2020-01-20 | Stop reason: SDUPTHER

## 2020-01-20 NOTE — TELEPHONE ENCOUNTER
Pt called to request a refill of   Requested Prescriptions     Pending Prescriptions Disp Refills    furosemide (LASIX) 20 mg tablet 90 Tab 1     Sig: Take 1 Tab by mouth daily as needed (swelling). Pt stated that the last time she received this medication she received a 40mg Rx and had to cut the pills in half. She does not want to do this again. She is requesting a 90-day supply. Please call in this Rx to the [address] on file.

## 2020-01-22 RX ORDER — FUROSEMIDE 20 MG/1
20 TABLET ORAL
Qty: 90 TAB | Refills: 1 | Status: SHIPPED | OUTPATIENT
Start: 2020-01-22 | End: 2021-03-14

## 2020-01-28 NOTE — PROGRESS NOTES
HPI  Ida Vaughan is a 69y.o. year old female patient of Elian Vital MD who presents with c/o poss UTI. Pt has history of has Essential hypertension, Fibromyalgia, Anisocoria, and Obesity, morbid ([address] on their problem list..    Pt c/o urinary burning, occurs at end of urination. Going on for last two weeks. Stopped drinking soda to see if that helped which it didn't. Denies urinary frequency. Denies blood in urine. Denies pelvic pain. Denies abnormal vag discharge. Denies new sexual partners. Also tried not taking her lasix everyday, hasn't had as much swelling. Also asking about injections in her back. Per Dr. Almaz Malave note she [facility]. Has been getting TFESI right L4 and L5 with Dr. Ramakrishna Schmitz. Last injection was November 13th. Can't be seen again until March 10th. Requesting a shot of toradol until then to hold her over, states she received one in April with Dr. Rylan Fajardo and it helped the pain tremendously. She is also requesting tramadol and naproxen. States her sciatica has been flaring up most last few days- right sided low back pain that radiates down right leg. Hurts the most when she is up on her feet at work all day. Has also been taking a natural vitamin supplement (bioflex?) which is helping her overall joint pain, but back pain persists. She wants to avoid taking too much tramadol if she can she states.         Patient Active Problem List   Diagnosis Code    Essential hypertension I10    Fibromyalgia M79.7    Anisocoria H57.02    Obesity, morbid ([address] E66.01     Past Medical History:   Diagnosis Date    Adverse effect of anesthesia     combative with anesthesia    Arthritis     Cancer ([address]     tumor on nose, removed, pt not sure what type    Fibromyalgia     Hypertension      Past Surgical History:   Procedure Laterality Date    HX DILATION AND CURETTAGE      HX TONSILLECTOMY      as a child     Social History     Socioeconomic History    Marital status: SINGLE     Spouse name: Not on file    Number of children: Not on file    Years of education: Not on file    Highest education level: Not on file   Tobacco Use    Smoking status: Never Smoker    Smokeless tobacco: Never Used   Substance and Sexual Activity    Alcohol use: No    Drug use: Never    Sexual activity: Never     Family History   Problem Relation Age of Onset    Diabetes Mother     Heart Disease Mother     Hypertension Mother     Hypertension Father     Heart Disease Father      No Known Allergies    MEDICATIONS  Current Outpatient Medications   Medication Sig    furosemide (LASIX) 20 mg tablet Take 1 Tab by mouth daily as needed (swelling).  traMADol (ULTRAM) 50 mg tablet Take 1 Tab by mouth every six (6) hours as needed for Pain for up to 30 days. Max Daily Amount: 200 mg.    lisinopril (PRINIVIL, ZESTRIL) 10 mg tablet Take 1 Tab by mouth daily.  pregabalin (LYRICA) 75 mg capsule TAKE ONE CAPSULE BY MOUTH ONCE DAILY MAX  75  MG  PER/DAY    acetaminophen (TYLENOL) 325 mg tablet Take  by mouth every four (4) hours as needed for Pain. No current facility-administered medications for this visit. REVIEW OF SYSTEMS  Per HPI        Visit Vitals  /69 (BP 1 Location: Left arm, BP Patient Position: Sitting)   Pulse 61   Temp 98.1 °F (36.7 °C) (Oral)   Resp 16   Ht 5' (1.524 m)   Wt 234 lb 12.8 oz (106.5 kg)   SpO2 98%   BMI 45.86 kg/m²         General: Well-developed, well-nourished. In no distress. A&O x 3. Head: Normocephalic, atraumatic. Eyes: Conjunctiva clear. Back: Symmetric. ROM intact. No CVA tenderness. Abdomen: Soft, non-distended, bowel sounds normal. No tenderness. No masses. No hepatosplenomegaly. Skin: No rashes or lesions. Neurovasc: No edema appreciated. Musculoskeletal: Gait normal.   Psychiatric: Normal mood and affect.  Behavior is normal.     Lab Results   Component Value Date/Time    Sodium 139 08/19/2019 03:06 PM Potassium 5.1 08/19/2019 03:06 PM    Chloride 99 08/19/2019 03:06 PM    CO2 24 08/19/2019 03:06 PM    Anion gap 7 05/16/2010 09:05 AM    Glucose 83 08/19/2019 03:06 PM    BUN 38 (H) 08/19/2019 03:06 PM    Creatinine 1.15 (H) 08/19/2019 03:06 PM    BUN/Creatinine ratio 33 (H) 08/19/2019 03:06 PM    GFR est AA 55 (L) 08/19/2019 03:06 PM    GFR est non-AA 48 (L) 08/19/2019 03:06 PM    Calcium 9.3 08/19/2019 03:06 PM         Results for orders placed or performed in visit on 01/29/20   AMB POC URINALYSIS DIP STICK AUTO W/O MICRO   Result Value Ref Range    Color (UA POC) Yellow     Clarity (UA POC) Clear     Glucose (UA POC) Negative Negative    Bilirubin (UA POC) Negative Negative    Ketones (UA POC) Negative Negative    Specific gravity (UA POC) 1.015 1.001 - 1.035    Blood (UA POC) Negative Negative    pH (UA POC) 5.0 4.6 - 8.0    Protein (UA POC) Negative Negative    Urobilinogen (UA POC) 0.2 mg/dL 0.2 - 1    Nitrites (UA POC) Negative Negative    Leukocyte esterase (UA POC) Negative Negative       ASSESSMENT and PLAN  Diagnoses and all orders for this visit:    1. Dysuria  -     AMB POC URINALYSIS DIP STICK AUTO W/O MICRO- no sign of infection  Ensured pt not using any scented vaginal products or douching. Push fluids, contact office if sx persist.     2. Chronic bilateral low back pain with right-sided sciatica  -     KETOROLAC TROMETHAMINE INJ  -     IL THER/PROPH/DIAG INJECTION, SUBCUT/IM  Pt to f/u with Dr. Margarita Fitzgerald. If she needs tramadol will contact  [facility]. Advised not to start naproxen for a few days, and advised not intended for long term use due to risk for decline in kidney function. Patient Instructions          Learning About How to Have a Healthy Back  What causes back pain? Back pain is often caused by overuse, strain, or injury. For example, people often hurt their backs playing sports or working in the yard, being jolted in a car accident, or lifting something too heavy.   Aging plays a part too. Your bones and muscles tend to lose strength as you age, which makes injury more likely. The spongy discs between the bones of the spine (vertebrae) may suffer from wear and tear and no longer provide enough cushion between the bones. A disc that bulges or breaks open (herniated disc) can press on nerves, causing back pain. In some people, back pain is the result of arthritis, broken vertebrae caused by bone loss (osteoporosis), illness, or a spine problem. Although most people have back pain at one time or another, there are steps you can take to make it less likely. How can you have a healthy back? Reduce stress on your back through good posture  Slumping or slouching alone may not cause low back pain. But after the back has been strained or injured, bad posture can make pain worse. · Sleep in a position that maintains your back's normal curves and on a mattress that feels comfortable. Sleep on your side with a pillow between your knees, or sleep on your back with a pillow under your knees. These positions can reduce strain on your back. · Stand and sit up straight. \"Good posture\" generally means your ears, shoulders, and hips are in a straight line. · If you must stand for a long time, put one foot on a stool, ledge, or box. Switch feet every now and then. · Sit in a chair that is low enough to let you place both feet flat on the floor with both knees nearly level with your hips. If your chair or desk is too high, use a footrest to raise your knees. Place a small pillow, a rolled-up towel, or a lumbar roll in the curve of your back if you need extra support. · Try a kneeling chair, which helps tilt your hips forward. This takes pressure off your lower back. · Try sitting on an exercise ball. It can rock from side to side, which helps keep your back loose. · When driving, keep your knees nearly level with your hips. Sit straight, and drive with both hands on the steering wheel.  Your arms should be in a slightly bent position. Reduce stress on your back through careful lifting  · Squat down, bending at the hips and knees only. If you need to, put one knee to the floor and extend your other knee in front of you, bent at a right angle (half kneeling). · Press your chest straight forward. This helps keep your upper back straight while keeping a slight arch in your low back. · Hold the load as close to your body as possible, at the level of your belly button (navel). · Use your feet to change direction, taking small steps. · Lead with your hips as you change direction. Keep your shoulders in line with your hips as you move. · Set down your load carefully, squatting with your knees and hips only. Exercise and stretch your back  · Do some exercise on most days of the week, if your doctor says it is okay. You can walk, run, swim, or cycle. · Stretch your back muscles. Here are a few exercises to try:  ? Lie on your back, and gently pull one bent knee to your chest. Put that foot back on the floor, and then pull the other knee to your chest.  ? Do pelvic tilts. Lie on your back with your knees bent. Tighten your stomach muscles. Pull your belly button (navel) in and up toward your ribs. You should feel like your back is pressing to the floor and your hips and pelvis are slightly lifting off the floor. Hold for 6 seconds while breathing smoothly. ? Sit with your back flat against a wall. · Keep your core muscles strong. The muscles of your back, belly (abdomen), and buttocks support your spine. ? Pull in your belly and imagine pulling your navel toward your spine. Hold this for 6 seconds, then relax. Remember to keep breathing normally as you tense your muscles. ? Do curl-ups. Always do them with your knees bent. Keep your low back on the floor, and curl your shoulders toward your knees using a smooth, slow motion.  Keep your arms folded across your chest. If this bothers your neck, try putting your hands behind your neck (not your head), with your elbows spread apart. ? Lie on your back with your knees bent and your feet flat on the floor. Tighten your belly muscles, and then push with your feet and raise your buttocks up a few inches. Hold this position 6 seconds as you continue to breathe normally, then lower yourself slowly to the floor. Repeat 8 to 12 times. ? If you like group exercise, try Pilates or yoga. These classes have poses that strengthen the core muscles. Lead a healthy lifestyle  · Stay at a healthy weight to avoid strain on your back. · Do not smoke. Smoking increases the risk of osteoporosis, which weakens the spine. If you need help quitting, talk to your doctor about stop-smoking programs and medicines. These can increase your chances of quitting for good. Where can you learn more? Go to http://sarahVtagOingris.info/. Enter L315 in the search box to learn more about \"Learning About How to Have a Healthy Back. \"  Current as of: June 26, 2019  Content Version: 12.2  © 2986-0730 [employer]. Care instructions adapted under license by [employer] (which disclaims liability or warranty for this information). If you have questions about a medical condition or this instruction, always ask your healthcare professional. [address] any warranty or liability for your use of this information. Do these stretches daily. Painful Urination (Dysuria): Care Instructions  Your Care Instructions  Burning pain with urination (dysuria) is a common symptom of a urinary tract infection or other urinary problems. The bladder may become inflamed. This can cause pain when the bladder fills and empties. You may also feel pain if the tube that carries urine from the bladder to the outside of the body (urethra) gets irritated or infected.   Sexually transmitted infections (STIs) also may cause pain when you urinate. Sometimes the pain can be caused by things other than an infection. The urethra can be irritated by soaps, perfumes, or foreign objects in the urethra. Kidney stones can cause pain when they pass through the urethra. The cause may be hard to find. You may need tests. Treatment for painful urination depends on the cause. Follow-up care is a key part of your treatment and safety. Be sure to make and go to all appointments, and call your doctor if you are having problems. It's also a good idea to know your test results and keep a list of the medicines you take. How can you care for yourself at home? · Drink extra water for the next day or two. This will help make the urine less concentrated. (If you have kidney, heart, or liver disease and have to limit fluids, talk with your doctor before you increase the amount of fluids you drink.)  · Avoid drinks that are carbonated or have caffeine. They can irritate the bladder. · Urinate often. Try to empty your bladder each time. For women:  · Urinate right after you have sex. · After going to the bathroom, wipe from front to back. · Avoid douches, bubble baths, and feminine hygiene sprays. And avoid other feminine hygiene products that have deodorants. When should you call for help? Call your doctor now or seek immediate medical care if:    · You have new symptoms, such as fever, nausea, or vomiting.     · You have new or worse symptoms of a urinary problem. For example:  ? You have blood or pus in your urine. ? You have chills or body aches. ? It hurts worse to urinate. ? You have groin or belly pain. ? You have pain in your back just below your rib cage (the flank area).    Watch closely for changes in your health, and be sure to contact your doctor if you have any problems. Where can you learn more? Go to http://sarah-ingris.info/.   Enter L043 in the search box to learn more about \"Painful Urination (Dysuria): Care Instructions. \"  Current as of: December 19, 2018  Content Version: 12.2  © 2026-0922 [employer]. Care instructions adapted under license by [email or web address] (which disclaims liability or warranty for this information). If you have questions about a medical condition or this instruction, always ask your healthcare professional. [address] any warranty or liability for your use of this information. Please keep your follow-up appointment with Aniceto Workman MD.         Health Maintenance Due   Topic Date Due    BREAST CANCER SCRN MAMMOGRAM  05/11/2019       I have discussed the diagnosis with the patient and the intended plan as seen in the above orders. Patient is in agreement. The patient has received an after-visit summary and questions were answered concerning future plans. I have discussed medication side effects and warnings with the patient as well. Warning signs for the above conditions were discussed including when to call our office or go to the emergency room. The nurse provided the patient and/or family with advanced directive information if needed and encouraged the patient to provide a copy to the office when available.

## 2020-01-29 ENCOUNTER — OFFICE VISIT (OUTPATIENT)
Dept: INTERNAL MEDICINE CLINIC | Facility: CLINIC | Age: 75
End: 2020-01-29

## 2020-01-29 VITALS
HEIGHT: 60 IN | SYSTOLIC BLOOD PRESSURE: 116 MMHG | DIASTOLIC BLOOD PRESSURE: 69 MMHG | BODY MASS INDEX: 46.1 KG/M2 | OXYGEN SATURATION: 98 % | WEIGHT: 234.8 LBS | RESPIRATION RATE: 16 BRPM | HEART RATE: 61 BPM | TEMPERATURE: 98.1 F

## 2020-01-29 DIAGNOSIS — M54.41 CHRONIC BILATERAL LOW BACK PAIN WITH RIGHT-SIDED SCIATICA: ICD-10-CM

## 2020-01-29 DIAGNOSIS — G89.29 CHRONIC BILATERAL LOW BACK PAIN WITH RIGHT-SIDED SCIATICA: ICD-10-CM

## 2020-01-29 DIAGNOSIS — R30.0 DYSURIA: Primary | ICD-10-CM

## 2020-01-29 LAB
BILIRUB UR QL STRIP: NEGATIVE
GLUCOSE UR-MCNC: NEGATIVE MG/DL
KETONES P FAST UR STRIP-MCNC: NEGATIVE MG/DL
PH UR STRIP: 5 [PH] (ref 4.6–8)
PROT UR QL STRIP: NEGATIVE
SP GR UR STRIP: 1.01 (ref 1–1.03)
UA UROBILINOGEN AMB POC: NORMAL (ref 0.2–1)
URINALYSIS CLARITY POC: CLEAR
URINALYSIS COLOR POC: YELLOW
URINE BLOOD POC: NEGATIVE
URINE LEUKOCYTES POC: NEGATIVE
URINE NITRITES POC: NEGATIVE

## 2020-01-29 RX ORDER — LORAZEPAM 1 MG/1
TABLET ORAL
COMMUNITY
Start: 2020-01-28 | End: 2021-03-26

## 2020-01-29 NOTE — PATIENT INSTRUCTIONS
Learning About How to Have a Healthy Back  What causes back pain? Back pain is often caused by overuse, strain, or injury. For example, people often hurt their backs playing sports or working in the yard, being jolted in a car accident, or lifting something too heavy. Aging plays a part too. Your bones and muscles tend to lose strength as you age, which makes injury more likely. The spongy discs between the bones of the spine (vertebrae) may suffer from wear and tear and no longer provide enough cushion between the bones. A disc that bulges or breaks open (herniated disc) can press on nerves, causing back pain. In some people, back pain is the result of arthritis, broken vertebrae caused by bone loss (osteoporosis), illness, or a spine problem. Although most people have back pain at one time or another, there are steps you can take to make it less likely. How can you have a healthy back? Reduce stress on your back through good posture  Slumping or slouching alone may not cause low back pain. But after the back has been strained or injured, bad posture can make pain worse. · Sleep in a position that maintains your back's normal curves and on a mattress that feels comfortable. Sleep on your side with a pillow between your knees, or sleep on your back with a pillow under your knees. These positions can reduce strain on your back. · Stand and sit up straight. \"Good posture\" generally means your ears, shoulders, and hips are in a straight line. · If you must stand for a long time, put one foot on a stool, ledge, or box. Switch feet every now and then. · Sit in a chair that is low enough to let you place both feet flat on the floor with both knees nearly level with your hips. If your chair or desk is too high, use a footrest to raise your knees. Place a small pillow, a rolled-up towel, or a lumbar roll in the curve of your back if you need extra support.   · Try a kneeling chair, which helps tilt your hips forward. This takes pressure off your lower back. · Try sitting on an exercise ball. It can rock from side to side, which helps keep your back loose. · When driving, keep your knees nearly level with your hips. Sit straight, and drive with both hands on the steering wheel. Your arms should be in a slightly bent position. Reduce stress on your back through careful lifting  · Squat down, bending at the hips and knees only. If you need to, put one knee to the floor and extend your other knee in front of you, bent at a right angle (half kneeling). · Press your chest straight forward. This helps keep your upper back straight while keeping a slight arch in your low back. · Hold the load as close to your body as possible, at the level of your belly button (navel). · Use your feet to change direction, taking small steps. · Lead with your hips as you change direction. Keep your shoulders in line with your hips as you move. · Set down your load carefully, squatting with your knees and hips only. Exercise and stretch your back  · Do some exercise on most days of the week, if your doctor says it is okay. You can walk, run, swim, or cycle. · Stretch your back muscles. Here are a few exercises to try:  ? Lie on your back, and gently pull one bent knee to your chest. Put that foot back on the floor, and then pull the other knee to your chest.  ? Do pelvic tilts. Lie on your back with your knees bent. Tighten your stomach muscles. Pull your belly button (navel) in and up toward your ribs. You should feel like your back is pressing to the floor and your hips and pelvis are slightly lifting off the floor. Hold for 6 seconds while breathing smoothly. ? Sit with your back flat against a wall. · Keep your core muscles strong. The muscles of your back, belly (abdomen), and buttocks support your spine. ? Pull in your belly and imagine pulling your navel toward your spine. Hold this for 6 seconds, then relax.  Remember to keep breathing normally as you tense your muscles. ? Do curl-ups. Always do them with your knees bent. Keep your low back on the floor, and curl your shoulders toward your knees using a smooth, slow motion. Keep your arms folded across your chest. If this bothers your neck, try putting your hands behind your neck (not your head), with your elbows spread apart. ? Lie on your back with your knees bent and your feet flat on the floor. Tighten your belly muscles, and then push with your feet and raise your buttocks up a few inches. Hold this position 6 seconds as you continue to breathe normally, then lower yourself slowly to the floor. Repeat 8 to 12 times. ? If you like group exercise, try Pilates or yoga. These classes have poses that strengthen the core muscles. Lead a healthy lifestyle  · Stay at a healthy weight to avoid strain on your back. · Do not smoke. Smoking increases the risk of osteoporosis, which weakens the spine. If you need help quitting, talk to your doctor about stop-smoking programs and medicines. These can increase your chances of quitting for good. Where can you learn more? Go to http://sarahNvelopedingris.info/. Enter L315 in the search box to learn more about \"Learning About How to Have a Healthy Back. \"  Current as of: June 26, 2019  Content Version: 12.2  © 1787-2650 [employer]. Care instructions adapted under license by [employer] (which disclaims liability or warranty for this information). If you have questions about a medical condition or this instruction, always ask your healthcare professional. [place] [address] any warranty or liability for your use of this information. Do these stretches daily. Painful Urination (Dysuria): Care Instructions  Your Care Instructions  Burning pain with urination (dysuria) is a common symptom of a urinary tract infection or other urinary problems.  The bladder may become inflamed. This can cause pain when the bladder fills and empties. You may also feel pain if the tube that carries urine from the bladder to the outside of the body (urethra) gets irritated or infected. Sexually transmitted infections (STIs) also may cause pain when you urinate. Sometimes the pain can be caused by things other than an infection. The urethra can be irritated by soaps, perfumes, or foreign objects in the urethra. Kidney stones can cause pain when they pass through the urethra. The cause may be hard to find. You may need tests. Treatment for painful urination depends on the cause. Follow-up care is a key part of your treatment and safety. Be sure to make and go to all appointments, and call your doctor if you are having problems. It's also a good idea to know your test results and keep a list of the medicines you take. How can you care for yourself at home? · Drink extra water for the next day or two. This will help make the urine less concentrated. (If you have kidney, heart, or liver disease and have to limit fluids, talk with your doctor before you increase the amount of fluids you drink.)  · Avoid drinks that are carbonated or have caffeine. They can irritate the bladder. · Urinate often. Try to empty your bladder each time. For women:  · Urinate right after you have sex. · After going to the bathroom, wipe from front to back. · Avoid douches, bubble baths, and feminine hygiene sprays. And avoid other feminine hygiene products that have deodorants. When should you call for help? Call your doctor now or seek immediate medical care if:    · You have new symptoms, such as fever, nausea, or vomiting.     · You have new or worse symptoms of a urinary problem. For example:  ? You have blood or pus in your urine. ? You have chills or body aches. ? It hurts worse to urinate. ? You have groin or belly pain. ? You have pain in your back just below your rib cage (the flank area).  Watch closely for changes in your health, and be sure to contact your doctor if you have any problems. Where can you learn more? Go to http://sarah-ingris.info/. Enter W585 in the search box to learn more about \"Painful Urination (Dysuria): Care Instructions. \"  Current as of: December 19, 2018  Content Version: 12.2  © 4821-5491 [employer]. Care instructions adapted under license by [employer] (which disclaims liability or warranty for this information). If you have questions about a medical condition or this instruction, always ask your healthcare professional. [address] any warranty or liability for your use of this information.

## 2020-01-29 NOTE — PROGRESS NOTES
Lexi Newton  Identified pt with two pt identifiers(name and ). Chief Complaint   Patient presents with    Urinary Frequency     Room 4A //        Reviewed record In preparation for visit and have obtained necessary documentation. 1. Have you been to the ER, urgent care clinic or hospitalized since your last visit? No     2. Have you seen or consulted any other health care providers outside of the [address] [place] [address] since your last visit? Include any pap smears or colon screening. No    Patient thinks they have an advance directive. Vitals reviewed with provider. Health Maintenance reviewed:     Health Maintenance Due   Topic    BREAST CANCER SCRN MAMMOGRAM         Wt Readings from Last 3 Encounters:   19 236 lb 6.4 oz (107.2 kg)   19 234 lb (106.1 kg)   19 237 lb 1.6 oz (107.5 kg)      Temp Readings from Last 3 Encounters:   19 98.2 °F (36.8 °C) (Oral)   19 98.2 °F (36.8 °C)   19 98.2 °F (36.8 °C) (Oral)      BP Readings from Last 3 Encounters:   19 115/65   19 158/66   19 111/69      Pulse Readings from Last 3 Encounters:   19 72   19 (!) 59   19 63      Vitals:    20 1510   Resp: 16   Height: 5' (1.524 m)          Learning Assessment:   :     Learning Assessment 2019   PRIMARY LEARNER Patient   BARRIERS PRIMARY LEARNER NONE   CO-LEARNER CAREGIVER No   PRIMARY LANGUAGE ENGLISH   LEARNER PREFERENCE PRIMARY DEMONSTRATION   ANSWERED BY self   RELATIONSHIP SELF        Depression Screening:   :     3 most recent PHQ Screens 2019   Little interest or pleasure in doing things Not at all   Feeling down, depressed, irritable, or hopeless Not at all   Total Score PHQ 2 0        Fall Risk Assessment:   :     Fall Risk Assessment, last 12 mths 2019   Able to walk? Yes   Fall in past 12 months? Yes   Fall with injury?  No   Number of falls in past 12 months 2   Fall Risk Score 2        Abuse Screening:   : Abuse Screening Questionnaire 8/19/2019 7/3/2018 5/17/2018   Do you ever feel afraid of your partner? N N N   Are you in a relationship with someone who physically or mentally threatens you? N N N   Is it safe for you to go home?  Y Y Y        ADL Screening:   :     ADL Assessment 7/3/2018   Feeding yourself No Help Needed   Getting from bed to chair No Help Needed   Getting dressed No Help Needed   Bathing or showering No Help Needed   Walk across the room (includes cane/walker) No Help Needed   Using the telphone No Help Needed   Taking your medications No Help Needed   Preparing meals No Help Needed   Managing money (expenses/bills) No Help Needed   Moderately strenuous housework (laundry) No Help Needed   Shopping for personal items (toiletries/medicines) No Help Needed   Shopping for groceries No Help Needed   Driving No Help Needed   Climbing a flight of stairs No Help Needed   Getting to places beyond walking distances No Help Needed

## 2020-02-04 DIAGNOSIS — M79.7 FIBROMYALGIA: ICD-10-CM

## 2020-02-04 NOTE — TELEPHONE ENCOUNTER
Pt called to request a refill of   Requested Prescriptions     Pending Prescriptions Disp Refills    traMADol (ULTRAM) 50 mg tablet 30 Tab 0     Sig: Take 1 Tab by mouth every six (6) hours as needed for Pain for up to 30 days. Max Daily Amount: 200 mg. To be called into the [facility]) on file. Pt stated to call back at [phone] w/ any questions (VM is okay).

## 2020-02-07 RX ORDER — TRAMADOL HYDROCHLORIDE 50 MG/1
50 TABLET ORAL
Qty: 30 TAB | Refills: 0 | Status: SHIPPED | OUTPATIENT
Start: 2020-02-07 | End: 2020-05-22

## 2020-02-07 NOTE — TELEPHONE ENCOUNTER
Pt called to f/u on this request for Tramadol from 2/4/20. Pt requested a call back at [phone].

## 2020-03-04 NOTE — PERIOP NOTES
[facility]  Ambulatory Surgery Unit  Pre-operative Instructions    Procedure Date  3/10/2020            Tentative Arrival Time 1240      1. On the day of your procedure, please report to the Ambulatory Surgery Unit Registration Desk and sign in at your designated time. The Ambulatory Surgery Unit is located in [facility] on the [facility] side of the [facility] across from the [address] [place] [address]. Please have all of your health insurance cards and a photo ID. 2. You must have someone with you to drive you home as directed by your surgeon. 3. You may have a light breakfast and take normal morning medications. 4. We recommend you do not drink any alcoholic beverages for 24 hours before and after your procedure. 5. Contact your surgeons office for instructions on the following medications: non-steroidal anti-inflammatory drugs (i.e. Advil, Aleve), vitamins, and supplements. (Some surgeons will want you to stop these medications prior to surgery and others may allow you to take them)   **If you are currently taking Plavix, Coumadin, Aspirin and/or other blood-thinning agents, contact your surgeon for instructions. ** Your surgeon will partner with the physician prescribing these medications to determine if it is safe to stop or if you need to continue taking. Please do not stop taking these medications without instructions from your surgeon. 6. In an effort to help prevent surgical site infection, we ask that you shower with an anti-bacterial soap (i.e. Dial or Safeguard) on the morning of your procedure. Do not apply any lotions, powders, or deodorants after showering. 7. Wear comfortable clothes. Wear glasses instead of contacts. Do not bring any jewelry or money (other than copays or fees as instructed). Do not wear make-up, particularly mascara, the morning of your procedure. Wear your hair loose or down, no ponytails, buns, chelly pins or clips.  All body piercings must be removed. 8. You should understand that if you do not follow these instructions your procedure may be cancelled. If your physical condition changes (i.e. fever, cold or flu) please contact your surgeon as soon as possible. 9. It is important that you be on time. If a situation occurs where you may be late, or if you have any questions or problems, please call [phone].    10. Your procedure time may be subject to change. You will receive a phone call the day prior to confirm your arrival time. I understand a pre-operative phone call will be made to verify my procedure time. In the event that I am not available, I give permission for a message to be left on my answering service and/or with another person?       yes    The above instructions were given to pt over the phone and she verbalized an understanding     ___________________      ___________________      ___________________  (Signature of Patient)          (Witness)                   (Date and Time)

## 2020-03-10 ENCOUNTER — APPOINTMENT (OUTPATIENT)
Dept: GENERAL RADIOLOGY | Age: 75
End: 2020-03-10
Attending: PHYSICAL MEDICINE & REHABILITATION
Payer: MEDICARE

## 2020-03-10 ENCOUNTER — HOSPITAL ENCOUNTER (OUTPATIENT)
Age: 75
Setting detail: OUTPATIENT SURGERY
Discharge: HOME | End: 2020-03-10
Attending: PHYSICAL MEDICINE & REHABILITATION | Admitting: PHYSICAL MEDICINE & REHABILITATION
Payer: MEDICARE

## 2020-03-10 VITALS
WEIGHT: 227 LBS | HEART RATE: 56 BPM | HEIGHT: 60 IN | TEMPERATURE: 98.7 F | RESPIRATION RATE: 18 BRPM | SYSTOLIC BLOOD PRESSURE: 134 MMHG | OXYGEN SATURATION: 97 % | BODY MASS INDEX: 44.57 KG/M2 | DIASTOLIC BLOOD PRESSURE: 76 MMHG

## 2020-03-10 PROCEDURE — 74011250636 HC RX REV CODE- 250/636: Performed by: PHYSICAL MEDICINE & REHABILITATION

## 2020-03-10 PROCEDURE — 74011000250 HC RX REV CODE- 250: Performed by: PHYSICAL MEDICINE & REHABILITATION

## 2020-03-10 PROCEDURE — 72020 X-RAY EXAM OF SPINE 1 VIEW: CPT

## 2020-03-10 PROCEDURE — 74011636320 HC RX REV CODE- 636/320: Performed by: PHYSICAL MEDICINE & REHABILITATION

## 2020-03-10 PROCEDURE — 76030000002 HC AMB SURG OR TIME FIRST 0.: Performed by: PHYSICAL MEDICINE & REHABILITATION

## 2020-03-10 PROCEDURE — 77030003665 HC NDL SPN BBMI -A: Performed by: PHYSICAL MEDICINE & REHABILITATION

## 2020-03-10 PROCEDURE — 76000 FLUOROSCOPY <1 HR PHYS/QHP: CPT

## 2020-03-10 PROCEDURE — 76210000046 HC AMBSU PH II REC FIRST 0.5 HR: Performed by: PHYSICAL MEDICINE & REHABILITATION

## 2020-03-10 RX ORDER — LIDOCAINE HYDROCHLORIDE 20 MG/ML
5 INJECTION, SOLUTION EPIDURAL; INFILTRATION; INTRACAUDAL; PERINEURAL ONCE
Status: COMPLETED | OUTPATIENT
Start: 2020-03-10 | End: 2020-03-10

## 2020-03-10 RX ORDER — METHYLPREDNISOLONE ACETATE 40 MG/ML
80 INJECTION, SUSPENSION INTRA-ARTICULAR; INTRALESIONAL; INTRAMUSCULAR; SOFT TISSUE ONCE
Status: COMPLETED | OUTPATIENT
Start: 2020-03-10 | End: 2020-03-10

## 2020-03-10 RX ORDER — BUPIVACAINE HYDROCHLORIDE 5 MG/ML
10 INJECTION, SOLUTION EPIDURAL; INTRACAUDAL ONCE
Status: DISCONTINUED | OUTPATIENT
Start: 2020-03-10 | End: 2020-03-10 | Stop reason: HOSPADM

## 2020-03-10 NOTE — PERIOP NOTES
Kymberly [facility]  1945  [id number]    Situation:  Verbal report given from: BRIANNE Anna RN  Procedure: Procedure(s):  RIGHT L4-5, L5-S1 TFESI INJECTION    Background:    Preoperative diagnosis: Other intervertebral disc degeneration, lumbar region [M51.36]    Postoperative diagnosis: Other intervertebral disc degeneration, lumbar region [M51.36]    :  Dr. Oviedo Grief:  Intra-procedure medications, procedure, and allergies reviewed        Recommendation:    Discharge patient home after discharge instructions reviewed with patient. Rest until local has worn off.

## 2020-03-10 NOTE — DISCHARGE INSTRUCTIONS
Dr. Joss Asher Discharge Instructions  Transforaminal Epidural Steroid Injection/ Selective Nerve Block    You had a transforaminal epidural steroid injection/ selective nerve block today. You will probably have some numbness, and possibly weakness, in your leg for the next 6 to 8 hours. The steroids will slowly become effective, reducing your pain, over the next 2 weeks. You should begin feeling better after a few days, but it may take up to 2 weeks to notice the difference. The benefit you get from your injection will last a variable amount of time, depending on the severity of your lumbar spine problem.  Pain: Most people do not have any increase in pain after this injection. However, you might experience some soreness in your low back. If this happens, putting an ice pack over the sore area will help.  Bandage: You will have a small bandage covering the site of the injection. You may remove it once you get home.  Restrictions: Someone should drive you home after the injection. After that, you have no restrictions. You need to be careful while walking, as you may still have some numbness or weakness in your leg. You may resume your normal level of activity. You may take a shower or bath, and you may eat normally. You should continue your current exercises and/or therapy routine.   Medications: Continue your current medications as prescribed. If your pain decreases, you may reduce the amount of your pain medicines. If you stopped taking anticoagulants or blood-thinners before the injection, start them tomorrow. If you have diabetes, your blood sugar may be elevated for a few days. Call your primary doctor with any questions.   Call Dr. Joss Asher at [phone] if you experience:   Fever (101 degrees Fahrenheit or greater)   Nausea or vomiting   Headache unrelieved by your normal pain medicine   Redness or swelling at the injection site that lasts more than 1 day   New numbness, tingling, weakness, or pain that you didnt have before the injection    Follow-up appointment:   If still having pain in 1-2 weeks, call office at [phone] for a follow up appointment. DISCHARGE SUMMARY from Nurse    The following personal items collected during your admission are returned to you:   Dental Appliance: Dental Appliances: None  Vision:    Hearing Aid:    Jewelry: Jewelry: None  Clothing: Clothing: With patient  Other Valuables: Other Valuables: Wallet, Cell Phone(Given to daughter in law)  Valuables sent to safe: If you were given prescriptions, please review the written information on prescribed medications. · You will receive a Post Operative Call from one of the Recovery Room Nurses on the day after your surgery to check on you. It is very important for us to know how you are recovering after your surgery. · You may receive an e-mail or letter in the mail from [employer] regarding your experience with us in the Ambulatory Surgery Unit. Your feedback is valuable to us and we appreciate your participation in the survey. If you have not had your influenza or pneumococcal vaccines, please follow up with your primary care physician. The discharge information has been reviewed with the patient. The patient verbalized understanding.

## 2020-03-10 NOTE — OP NOTES
Epidural Steroid Injection Operative Report    Indications: This is a 69 y.o. female who presents with low back pain. She was positive for LS DDD. The patient was admitted for surgery as conservative measures have failed. Date of Surgery: 3/10/2020    Preoperative Diagnosis: LS DDD    Postoperative Diagnosis: LS DDD    Surgeon(s) and Role:     * Hortensia Horan MD - Primary     Procedure:  Procedure(s):  RIGHT L4-5, L5-S1 TFESI INJECTION    Procedure in Detail:  After appropriate informed consent was obtained, the patient was taken to the operating suite and placed in the prone position on the operating table on appropriate padding. The LS region was prepped and draped in the usual sterile fashion. Intraoperative fluoroscopy was used to localize the LS spine. The skin was infiltrated with 2% lidocaine. An 22-g needle was advanced into the Right L4 and L5 neuroforamen under fluoroscopic guidance. A small amount of contrast was injected into the epidural space, confirming appropriate needle placement on fluoroscopy. Next, 2ml of 2% lidocaine and 80mg of Depo-Medrol were injected. The needle was removed from the patient. The patient was then turned back into the supine position on the stretcher and was taken to the Recovery Room in stable condition.     Estimated Blood Loss:  none     Specimens: None       Drains: None          Complications:  None    Signed By: Iliana Baig MD                        March 10, 2020

## 2020-03-10 NOTE — PERIOP NOTES
Patient: Daphne Todd MRN: [id number]  SSN: [id number]   YOB: 1945  Age: 69 y.o. Sex: female     Patient is status post Procedure(s):  RIGHT L4-5, L5-S1 TFESI INJECTION.     Surgeon(s) and Role:     * Anusha Perez MD - Primary    Local/Dose/Irrigation:  SEE MAR                                         Dressing/Packing:     Dmitry Saucedo

## 2020-03-10 NOTE — H&P
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 Visit Vitals  Ht 5' (1.524 m)   Wt 106.6 kg (235 lb)   BMI 45.90 kg/m²     PE:  Gen: NAD  Head: normocephalic  Heart: RRR  Lungs: CTA raymond  Abd: NT, ND, soft  Neuro: awake and alert  Skin: intact    IMPRESSION:   LS DDD    Note:  The clinical status was discussed in detail with the patient. The procedure was again discussed and described in detail. All understand and accept the planned procedure and risks; reject other forms of treatment. All questions are answered.     Fernando Toro MD

## 2020-03-10 NOTE — PERIOP NOTES
Permission received to review discharge instructions and discuss private health information with daughter in law, Daniel. Hand  are equally strong, bilat. Leg/foot pushes/pulls are equally strong, bilat.

## 2020-05-22 ENCOUNTER — OFFICE VISIT (OUTPATIENT)
Dept: URGENT CARE | Age: 75
End: 2020-05-22

## 2020-05-22 VITALS — TEMPERATURE: 98.9 F | RESPIRATION RATE: 17 BRPM | HEART RATE: 80 BPM | OXYGEN SATURATION: 97 %

## 2020-05-22 DIAGNOSIS — R09.82 POST-NASAL DRIP: ICD-10-CM

## 2020-05-22 DIAGNOSIS — B96.89 BACTERIAL SINUSITIS: Primary | ICD-10-CM

## 2020-05-22 DIAGNOSIS — J32.9 BACTERIAL SINUSITIS: Primary | ICD-10-CM

## 2020-05-22 DIAGNOSIS — M79.7 FIBROMYALGIA: ICD-10-CM

## 2020-05-22 DIAGNOSIS — R05.9 COUGH: ICD-10-CM

## 2020-05-22 RX ORDER — DOXYCYCLINE 100 MG/1
100 TABLET ORAL 2 TIMES DAILY
Qty: 14 TAB | Refills: 0 | Status: SHIPPED | OUTPATIENT
Start: 2020-05-22 | End: 2020-05-29

## 2020-05-22 RX ORDER — PREGABALIN 75 MG/1
CAPSULE ORAL
Qty: 30 CAP | Refills: 1 | Status: SHIPPED | OUTPATIENT
Start: 2020-05-22 | End: 2020-09-15

## 2020-05-22 RX ORDER — TRAMADOL HYDROCHLORIDE 50 MG/1
TABLET ORAL
Qty: 30 TAB | Refills: 0 | Status: SHIPPED | OUTPATIENT
Start: 2020-05-22 | End: 2020-10-19

## 2020-05-22 NOTE — TELEPHONE ENCOUNTER
----- Message from Ribeiro Grade sent at 5/22/2020 11:08 AM EDT -----  Regarding: /Telephone  Medication Refill    Caller (if not patient):      Relationship of caller (if not patient):      Best contact number(s):  [phone]    Name of medication and dosage if known:  \"Tramadol\" and \"Lyrica\"      Is patient out of this medication (yes/no):   No    Pharmacy name: [facility] listed in chart? (yes/no): Yes  Pharmacy phone number:       Details to clarify the request:      Ribeiro Grade

## 2020-05-22 NOTE — PROGRESS NOTES
Subjective: (As above and below)       This patient was seen in Flu Clinic at [address] [place] [address] Urgent Care while in their vehicle due to COVID-19 pandemic with PPE and focused examination in order to decrease community viral transmission. The patient/guardian gave verbal consent to treat. Chief Complaint   Patient presents with    Cold Symptoms     Pt c/o intermittent cough for past couple of weeks. Tom Galeana is a 69 y.o. female who present complaining of \"sinus infection\". Symptom onset 21 days ago including sinus pain, post nasal drip and cough. She has tried 1/2 dose of zyrtec and this didn't resolve symptoms. Preceding illness: none. No aggravating or alleviating factors. Symptoms are constant and overall unchanged. Promotes no decrease in PO intake of fluids. Denies: severe lethargy, SOB, syncope/near syncope, vomiting/diarrhea, chest pain, chest pain with breathing, labored breathing, severe headache, non-intractable fevers . Recent travel: no  Known Exposure to COVID-19: no. Promotes had done well with self home isolation/quarantine given her risk factors. Known flu or strep contact: no    Hx of fibromyalgia, HTN, CA tumor      Review of Systems - negative except as listed above    Reviewed PmHx, RxHx, FmHx, SocHx, AllgHx and updated in chart.   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, removed, pt not sure what type    Fibromyalgia     Hypertension       Social History     Socioeconomic History    Marital status: SINGLE     Spouse name: Not on file    Number of children: Not on file    Years of education: Not on file    Highest education level: Not on file   Tobacco Use    Smoking status: Never Smoker    Smokeless tobacco: Never Used   Substance and Sexual Activity    Alcohol use: No    Drug use: Never    Sexual activity: Never          Current Outpatient Medications   Medication Sig    doxycycline (ADOXA) 100 mg tablet Take 1 Tab by mouth two (2) times a day for 7 days.  traMADol (ULTRAM) 50 mg tablet Take 1 Tab by mouth every six (6) hours as needed for Pain for up to 30 days. Max Daily Amount: 200 mg.  LORazepam (ATIVAN) 1 mg tablet Take one pill 1.5 hours prior to injection    furosemide (LASIX) 20 mg tablet Take 1 Tab by mouth daily as needed (swelling). (Patient taking differently: Take 20 mg by mouth daily.)    lisinopril (PRINIVIL, ZESTRIL) 10 mg tablet Take 1 Tab by mouth daily.  pregabalin (LYRICA) 75 mg capsule TAKE ONE CAPSULE BY MOUTH ONCE DAILY MAX  75  MG  PER/DAY    acetaminophen (TYLENOL) 325 mg tablet Take  by mouth every four (4) hours as needed for Pain. No current facility-administered medications for this visit. Objective:     Vitals:    05/22/20 0929   Pulse: 80   Resp: 17   Temp: 98.9 °F (37.2 °C)   SpO2: 97%       Physical Exam  General appearance - appears well hydrated and does not appear toxic, no acute distress  Eyes - EOMs intact. Non injected. No scleral icterus   Ears - no external swelling  Nose - decreased patency bilat. No purulent drainage noted  Mouth - OP mild erythema without swelling, exudate or lesion. Mucus membranes moist. Uvula midline. Neck/Lymphatics - trachea midline, full AROM  Chest - lungs clear to auscultation bilat. No rales, rhonchi or wheeze. Normal breathing effort  Heart - RRR. No murmur click rub or gallop. Skin - no observable rashes or pallor  Neurologic- alert and oriented x 3  Psychiatric- normal mood, behavior and though content. Assessment/ Plan:     1.  Bacterial sinusitis  Suspect sinus infection   ddx include bronchitis, allergies  Patient declines COVID testing.    - doxycycline (ADOXA) 100 mg tablet; Take 1 Tab by mouth two (2) times a day for 7 days. Dispense: 14 Tab; Refill: 0    2. Post-nasal drip  - doxycycline (ADOXA) 100 mg tablet; Take 1 Tab by mouth two (2) times a day for 7 days. Dispense: 14 Tab; Refill: 0    3. Cough  - doxycycline (ADOXA) 100 mg tablet; Take 1 Tab by mouth two (2) times a day for 7 days. Dispense: 14 Tab; Refill: 0      Follow up: We have reviewed, in detail, particular presentations/worsening/concerning signs and symptoms that may warrant seeking immediate care in the ED setting and patient verbalized being aware of what to watch for. For other non-severe changes, non-improvement or questions, patient aware to contact PCP office or consider a virtual online medical consultation. Medication Side Effects, Adverse Effects, Risks, Benefits and Warnings and how to take medication properly were discussed with patient: yes    Reviewed with her that COVID-19 pandemic is an evolving situation with rapidly changing recommendations & guidelines. Medical decisions are made based on the the best information available at the time.   Recommended she stay tuned for updates published by trusted sources and to advise your PCP of any unexpected changes in clinical condition     Zahraa Chacon NP

## 2020-05-22 NOTE — TELEPHONE ENCOUNTER
REFILL     PCP: Pippa Carroll MD     Last appt: 1/29/2020   No future appointments. Requested Prescriptions     Pending Prescriptions Disp Refills    traMADoL (ULTRAM) 50 mg tablet [Pharmacy Med Name: traMADol HCl 50 MG Oral Tablet] 30 Tab 0     Sig: TAKE 1 TABLET BY MOUTH EVERY 6 HOURS AS NEEDED FOR PAIN FOR  UP  TO  30  DAYS,  MAX  DAILY  AMOUNT  200  MG.     pregabalin (Lyrica) 75 mg capsule 30 Cap 1     Sig: TAKE ONE CAPSULE BY MOUTH ONCE DAILY MAX  75  MG  PER/DAY

## 2020-07-28 ENCOUNTER — HOSPITAL ENCOUNTER (OUTPATIENT)
Dept: MRI IMAGING | Age: 75
Discharge: HOME | End: 2020-07-28
Attending: PHYSICAL MEDICINE & REHABILITATION
Payer: MEDICARE

## 2020-07-28 DIAGNOSIS — M54.50 LUMBAR PAIN: ICD-10-CM

## 2020-07-28 DIAGNOSIS — M47.27 OSTEOARTHRITIS OF SPINE WITH RADICULOPATHY, LUMBOSACRAL REGION: ICD-10-CM

## 2020-07-28 DIAGNOSIS — M51.36 DDD (DEGENERATIVE DISC DISEASE), LUMBAR: ICD-10-CM

## 2020-07-28 DIAGNOSIS — M43.16 SPONDYLOLISTHESIS OF LUMBAR REGION: ICD-10-CM

## 2020-07-28 DIAGNOSIS — M54.16 LUMBAR RADICULITIS: ICD-10-CM

## 2020-07-28 DIAGNOSIS — M79.7 FIBROMYALGIA: ICD-10-CM

## 2020-07-28 PROCEDURE — 72148 MRI LUMBAR SPINE W/O DYE: CPT

## 2020-09-15 RX ORDER — NAPROXEN 500 MG/1
500 TABLET ORAL 2 TIMES DAILY WITH MEALS
COMMUNITY
End: 2021-03-26

## 2020-09-15 RX ORDER — PREGABALIN 25 MG/1
25 CAPSULE ORAL 2 TIMES DAILY
COMMUNITY

## 2020-09-15 RX ORDER — FLUTICASONE PROPIONATE 50 MCG
2 SPRAY, SUSPENSION (ML) NASAL DAILY
COMMUNITY
End: 2021-08-10

## 2020-09-15 RX ORDER — CETIRIZINE HCL 10 MG
5 TABLET ORAL 2 TIMES DAILY
COMMUNITY
End: 2021-05-25

## 2020-09-15 NOTE — PERIOP NOTES
[facility]  Ambulatory Surgery Unit  Pre-operative Instructions    Procedure Date  Tuesday, September 22, 2020            Tentative Arrival Time 315      1. On the day of your procedure, please report to the Ambulatory Surgery Unit Registration Desk and sign in at your designated time. The Ambulatory Surgery Unit is located in [facility] on the [facility] side of the [facility] across from the [facility] access hospital. Please have all of your health insurance cards and a photo ID. 2. You must have someone with you to drive you home as directed by your surgeon. 3. You may have a light breakfast and take normal morning medications. 4. We recommend you do not drink any alcoholic beverages for 24 hours before and after your procedure. 5. Contact your surgeons office for instructions on the following medications: non-steroidal anti-inflammatory drugs (i.e. Advil, Aleve), vitamins, and supplements. (Some surgeons will want you to stop these medications prior to surgery and others may allow you to take them)   **If you are currently taking Plavix, Coumadin, Aspirin and/or other blood-thinning agents, contact your surgeon for instructions. ** Your surgeon will partner with the physician prescribing these medications to determine if it is safe to stop or if you need to continue taking. Please do not stop taking these medications without instructions from your surgeon. 6. In an effort to help prevent surgical site infection, we ask that you shower with an anti-bacterial soap (i.e. Dial or Safeguard) on the morning of your procedure. Do not apply any lotions, powders, or deodorants after showering. 7. Wear comfortable clothes. Wear glasses instead of contacts. Do not bring any jewelry or money (other than copays or fees as instructed). Do not wear make-up, particularly mascara, the morning of your procedure. Wear your hair loose or down, no ponytails, buns, chelly pins or clips.  All body piercings must be removed. 8. You should understand that if you do not follow these instructions your procedure may be cancelled. If your physical condition changes (i.e. fever, cold or flu) please contact your surgeon as soon as possible. 9. It is important that you be on time. If a situation occurs where you may be late, or if you have any questions or problems, please call [phone].    10. Your procedure time may be subject to change. You will receive a phone call the day prior to confirm your arrival time. I understand a pre-operative phone call will be made to verify my procedure time. In the event that I am not available, I give permission for a message to be left on my answering service and/or with another person?       yes    Preop instructions reviewed  Pt verbalized understanding.      ___________________      ___________________      ___________________  (Signature of Patient)          (Witness)                   (Date and Time)

## 2020-09-22 ENCOUNTER — APPOINTMENT (OUTPATIENT)
Dept: GENERAL RADIOLOGY | Age: 75
End: 2020-09-22
Attending: PHYSICAL MEDICINE & REHABILITATION
Payer: MEDICARE

## 2020-09-22 ENCOUNTER — HOSPITAL ENCOUNTER (OUTPATIENT)
Age: 75
Setting detail: OUTPATIENT SURGERY
Discharge: HOME | End: 2020-09-22
Attending: PHYSICAL MEDICINE & REHABILITATION | Admitting: PHYSICAL MEDICINE & REHABILITATION
Payer: MEDICARE

## 2020-09-22 VITALS
HEART RATE: 67 BPM | WEIGHT: 233 LBS | BODY MASS INDEX: 45.75 KG/M2 | OXYGEN SATURATION: 100 % | SYSTOLIC BLOOD PRESSURE: 130 MMHG | HEIGHT: 60 IN | TEMPERATURE: 98.5 F | DIASTOLIC BLOOD PRESSURE: 89 MMHG | RESPIRATION RATE: 15 BRPM

## 2020-09-22 PROCEDURE — 76030000002 HC AMB SURG OR TIME FIRST 0.: Performed by: PHYSICAL MEDICINE & REHABILITATION

## 2020-09-22 PROCEDURE — 74011250636 HC RX REV CODE- 250/636: Performed by: PHYSICAL MEDICINE & REHABILITATION

## 2020-09-22 PROCEDURE — 72020 X-RAY EXAM OF SPINE 1 VIEW: CPT

## 2020-09-22 PROCEDURE — 2709999900 HC NON-CHARGEABLE SUPPLY: Performed by: PHYSICAL MEDICINE & REHABILITATION

## 2020-09-22 PROCEDURE — 77030003665 HC NDL SPN BBMI -A: Performed by: PHYSICAL MEDICINE & REHABILITATION

## 2020-09-22 PROCEDURE — 76000 FLUOROSCOPY <1 HR PHYS/QHP: CPT

## 2020-09-22 PROCEDURE — 76210000046 HC AMBSU PH II REC FIRST 0.5 HR: Performed by: PHYSICAL MEDICINE & REHABILITATION

## 2020-09-22 PROCEDURE — 74011000636 HC RX REV CODE- 636: Performed by: PHYSICAL MEDICINE & REHABILITATION

## 2020-09-22 PROCEDURE — 74011000250 HC RX REV CODE- 250: Performed by: PHYSICAL MEDICINE & REHABILITATION

## 2020-09-22 RX ORDER — BUPIVACAINE HYDROCHLORIDE 5 MG/ML
5 INJECTION, SOLUTION EPIDURAL; INTRACAUDAL ONCE
Status: DISCONTINUED | OUTPATIENT
Start: 2020-09-22 | End: 2020-09-22 | Stop reason: HOSPADM

## 2020-09-22 RX ORDER — METHYLPREDNISOLONE ACETATE 40 MG/ML
40 INJECTION, SUSPENSION INTRA-ARTICULAR; INTRALESIONAL; INTRAMUSCULAR; SOFT TISSUE ONCE
Status: COMPLETED | OUTPATIENT
Start: 2020-09-22 | End: 2020-09-22

## 2020-09-22 RX ORDER — LIDOCAINE HYDROCHLORIDE 20 MG/ML
7 INJECTION, SOLUTION INFILTRATION; PERINEURAL ONCE
Status: COMPLETED | OUTPATIENT
Start: 2020-09-22 | End: 2020-09-22

## 2020-09-22 NOTE — OP NOTES
Epidural Steroid Injection Operative Report    Indications: This is a 69 y.o. female who presents with low back pain. She was positive for LS DDD. The patient was admitted for surgery as conservative measures have failed. Date of Surgery: 9/22/2020    Preoperative Diagnosis: LS DDD    Postoperative Diagnosis: LS DDD    Surgeon(s) and Role:     * Jorge Osborn MD - Primary     Procedure:  Procedure(s):  BILATERAL L2 TRANSFORAMINAL EPIDURAL STEROID INECTION    Procedure in Detail:  After appropriate informed consent was obtained, the patient was taken to the operating suite and placed in the prone position on the operating table on appropriate padding. The LS region was prepped and draped in the usual sterile fashion. Intraoperative fluoroscopy was used to localize the LS spine. The skin was infiltrated with 2% lidocaine. An 22-g needle was advanced into the Jose L2 neuroforamen under fluoroscopic guidance. A small amount of contrast was injected into the epidural space, confirming appropriate needle placement on fluoroscopy. Next, 2ml of 2% lidocaine and 80mg of Depo-Medrol were injected. The needle was removed from the patient. The patient was then turned back into the supine position on the stretcher and was taken to the Recovery Room in stable condition.     Estimated Blood Loss:  none     Specimens: None       Drains: None          Complications:  None    Signed By: Veronica Fernandes MD                        September 22, 2020

## 2020-09-22 NOTE — PERIOP NOTES
Lizette Amador  1945  [id number]    Situation:  Verbal report given from: RAUL Olmstead RN  Procedure: Procedure(s):  BILATERAL L2 TRANSFORAMINAL EPIDURAL STEROID INECTION    Background:    Preoperative diagnosis: DEGENERATIVE DISC DISEASE    Postoperative diagnosis: DEGENERATIVE DISC DISEASE    :  Dr. Stoney Gan:  Intra-procedure medications, procedure, and allergies reviewed        Recommendation:    Discharge patient home after discharge instructions reviewed with patient. Rest until local has worn off.

## 2020-09-22 NOTE — H&P
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 Visit Vitals  Ht 5' (1.524 m)   Wt 103 kg (227 lb)   BMI 44.33 kg/m²     PE:  Gen: NAD  Head: normocephalic  Heart: RRR  Lungs: CTA raymond  Abd: NT, ND, soft  Neuro: awake and alert  Skin: intact    IMPRESSION:   LS DDD    Note:  The clinical status was discussed in detail with the patient. The procedure was again discussed and described in detail. All understand and accept the planned procedure and risks; reject other forms of treatment. All questions are answered.     Idalia Corral MD

## 2020-09-22 NOTE — DISCHARGE INSTRUCTIONS
Dr. Bailey Chilel Discharge Instructions  Transforaminal Epidural Steroid Injection/ Selective Nerve Block    You had a transforaminal epidural steroid injection/ selective nerve block today. You will probably have some numbness, and possibly weakness, in your leg for the next 6 to 8 hours. The steroids will slowly become effective, reducing your pain, over the next 2 weeks. You should begin feeling better after a few days, but it may take up to 2 weeks to notice the difference. The benefit you get from your injection will last a variable amount of time, depending on the severity of your lumbar spine problem.  Pain: Most people do not have any increase in pain after this injection. However, you might experience some soreness in your low back. If this happens, putting an ice pack over the sore area will help.  Bandage: You will have a small bandage covering the site of the injection. You may remove it once you get home.  Restrictions: Someone should drive you home after the injection. After that, you have no restrictions. You need to be careful while walking, as you may still have some numbness or weakness in your leg. You may resume your normal level of activity. You may take a shower or bath, and you may eat normally. You should continue your current exercises and/or therapy routine.   Medications: Continue your current medications as prescribed. If your pain decreases, you may reduce the amount of your pain medicines. If you stopped taking anticoagulants or blood-thinners before the injection, start them tomorrow. If you have diabetes, your blood sugar may be elevated for a few days. Call your primary doctor with any questions.   Call Dr. Bailey Chilel at [phone] if you experience:   Fever (101 degrees Fahrenheit or greater)   Nausea or vomiting   Headache unrelieved by your normal pain medicine   Redness or swelling at the injection site that lasts more than 1 day   New numbness, tingling, weakness, or pain that you didnt have before the injection    Follow-up appointment:   If still having pain in 1-2 weeks, call office at [phone] for a follow up appointment. DISCHARGE SUMMARY from Nurse    The following personal items collected during your admission are returned to you:   Dental Appliance: Dental Appliances: None  Vision: Visual Aid: Glasses  Hearing Aid:    Jewelry: Jewelry: None  Clothing: Clothing: With patient  Other Valuables: Other Valuables: Purse, With patient  Valuables sent to safe: If you were given prescriptions, please review the written information on prescribed medications. · You will receive a Post Operative Call from one of the Recovery Room Nurses on the day after your surgery to check on you. It is very important for us to know how you are recovering after your surgery. · You may receive an e-mail or letter in the mail from [employer] regarding your experience with us in the Ambulatory Surgery Unit. Your feedback is valuable to us and we appreciate your participation in the survey. If you have not had your influenza or pneumococcal vaccines, please follow up with your primary care physician. The discharge information has been reviewed with the patient. The patient verbalized understanding. Patient Education        Learning About Coronavirus [phone])  Coronavirus [phone]): Overview  What is coronavirus ([id number])? The coronavirus disease (COVID-19) is caused by a virus. It is an illness that was first found in December 2019. It has since spread worldwide. The virus can cause fever, cough, and trouble breathing. In severe cases, it can cause pneumonia and make it hard to breathe without help. It can cause death. This virus spreads person-to-person through droplets from coughing and sneezing. It can also spread when you are close to someone who is infected.  And it can spread when you touch something that has the virus on it, such as a doorknob or a tabletop. Coronaviruses are a large group of viruses. They cause the common cold. They also cause more serious illnesses like [place] East respiratory syndrome (MERS) and severe acute respiratory syndrome (SARS). COVID-19 is caused by a novel coronavirus. That means it's a new type that has not been seen in people before. How is COVID-19 treated? Mild illness can be treated at home, but more serious illness needs to be treated in the hospital. Treatment may include medicines to reduce symptoms, plus breathing support such as oxygen therapy or a ventilator. Other treatments, such as antiviral medicines, may help people who have COVID-19. What can you do to protect yourself from COVID-19? The best way to protect yourself from getting sick is to:  · Avoid areas where there is an outbreak. · Avoid contact with people who may be infected. · Avoid crowds and try to stay at least 6 feet away from other people. · Wash your hands often, especially after you cough or sneeze. Use soap and water, and scrub for at least 20 seconds. If soap and water aren't available, use an alcohol-based hand . · Avoid touching your mouth, nose, and eyes. What can you do to avoid spreading the virus to others? To help avoid spreading the virus to others:  · Wash your hands often with soap or alcohol-based hand sanitizers. · Cover your mouth with a tissue when you cough or sneeze. Then throw the tissue in the trash. · Use a disinfectant to clean things that you touch often. These include doorknobs, remote controls, phones, and handles on your refrigerator and microwave. And don't forget countertops, tabletops, bathrooms, and computer keyboards. · Wear a cloth face cover if you have to go to public areas. If you know or suspect that you have COVID-19:  · Stay home. Don't go to school, work, or public areas. And don't use public transportation, ride-shares, or taxis unless you have no choice.   · Leave your home only if you need to get medical care or testing. But call the doctor's office first so they know you're coming. And wear a face cover. · Limit contact with people in your home. If possible, stay in a separate bedroom and use a separate bathroom. · Wear a face cover whenever you're around other people. It can help stop the spread of the virus when you cough or sneeze. · Clean and disinfect your home every day. Use household  and disinfectant wipes or sprays. Take special care to clean things that you grab with your hands. · Self-isolate until it's safe to be around others again. ? If you have symptoms, it's safe when you haven't had a fever for 3 days and your symptoms have improved and it's been at least 10 days since your symptoms started. ? If you were exposed to the virus but don't have symptoms, it's safe to be around others 14 days after exposure. ? Talk to your doctor about whether you also need testing, especially if you have a weakened immune system. When to call for help  Call 911 anytime you think you may need emergency care. For example, call if:  · You have severe trouble breathing. (You can't talk at all.)  · You have constant chest pain or pressure. · You are severely dizzy or lightheaded. · You are confused or can't think clearly. · Your face and lips have a blue color. · You passed out (lost consciousness) or are very hard to wake up. Call your doctor now if you develop symptoms such as:  · Shortness of breath. · Fever. · Cough. If you need to get care, call ahead to the doctor's office for instructions before you go. Make sure you wear a face cover to prevent exposing other people to the virus. Where can you get the latest information? The following health organizations are tracking and studying this virus. Their websites contain the most up-to-date information. Hayley Richard also learn what to do if you think you may have been exposed to the virus. · [place]  Centers for Disease Control and Prevention (CDC): The CDC provides updated news about the disease and travel advice. The website also tells you how to prevent the spread of infection. www.cdc.gov  · World Health Organization [facility]): WHO offers information about the virus outbreaks. WHO also has travel advice. www.who.int  Current as of: July 10, 2020               Content Version: 12.6  © 0554-5644 [employer], [employer]. Care instructions adapted under license by [employer] (which disclaims liability or warranty for this information). If you have questions about a medical condition or this instruction, always ask your healthcare professional. [address] any warranty or liability for your use of this information.

## 2020-09-22 NOTE — PERIOP NOTES
Pt with strong bilateral dorsi and plantar flexion. Injection site without drainage. Ambulates to chair with good gait. No c/o pain.  Waiting for ride,  Report to [facility]

## 2020-09-22 NOTE — PERIOP NOTES
Permission received to review discharge instructions and discuss private health information with daughter in law and son Lori Ribera. Patient states that Lori Ribera will be with them for at least 24 hours following today's procedure.

## 2020-09-22 NOTE — PERIOP NOTES
Skin assessment:   WNL   Skin color: normal for ethnicity   Skin condition: intact   Skin integrity: intact   Turgor: normal for age    Neuro:  Push/Pull assessment:     LUE Response: strong   LLE Response: strong   RUE Response: strong   RLE Response: strong

## 2020-10-19 DIAGNOSIS — I10 ESSENTIAL HYPERTENSION: ICD-10-CM

## 2020-10-19 DIAGNOSIS — M79.7 FIBROMYALGIA: ICD-10-CM

## 2020-10-19 RX ORDER — LISINOPRIL 10 MG/1
TABLET ORAL
Qty: 90 TAB | Refills: 0 | Status: SHIPPED | OUTPATIENT
Start: 2020-10-19 | End: 2021-01-18 | Stop reason: SDUPTHER

## 2020-10-19 RX ORDER — TRAMADOL HYDROCHLORIDE 50 MG/1
TABLET ORAL
Qty: 30 TAB | Refills: 0 | Status: SHIPPED | OUTPATIENT
Start: 2020-10-19 | End: 2022-03-10

## 2021-03-14 RX ORDER — FUROSEMIDE 20 MG/1
TABLET ORAL
Qty: 90 TAB | Refills: 0 | Status: SHIPPED | OUTPATIENT
Start: 2021-03-14 | End: 2021-03-26

## 2021-03-23 ENCOUNTER — APPOINTMENT (OUTPATIENT)
Dept: NUCLEAR MEDICINE | Age: 76
DRG: 315 | End: 2021-03-23
Attending: STUDENT IN AN ORGANIZED HEALTH CARE EDUCATION/TRAINING PROGRAM
Payer: MEDICARE

## 2021-03-23 ENCOUNTER — APPOINTMENT (OUTPATIENT)
Dept: GENERAL RADIOLOGY | Age: 76
DRG: 315 | End: 2021-03-23
Attending: STUDENT IN AN ORGANIZED HEALTH CARE EDUCATION/TRAINING PROGRAM
Payer: MEDICARE

## 2021-03-23 ENCOUNTER — APPOINTMENT (OUTPATIENT)
Dept: CT IMAGING | Age: 76
DRG: 315 | End: 2021-03-23
Attending: STUDENT IN AN ORGANIZED HEALTH CARE EDUCATION/TRAINING PROGRAM
Payer: MEDICARE

## 2021-03-23 ENCOUNTER — HOSPITAL ENCOUNTER (INPATIENT)
Age: 76
LOS: 2 days | Discharge: HOME HEALTH | DRG: 315 | End: 2021-03-26
Attending: STUDENT IN AN ORGANIZED HEALTH CARE EDUCATION/TRAINING PROGRAM | Admitting: INTERNAL MEDICINE
Payer: MEDICARE

## 2021-03-23 DIAGNOSIS — N17.9 AKI (ACUTE KIDNEY INJURY) (HCC): ICD-10-CM

## 2021-03-23 DIAGNOSIS — R53.1 GENERALIZED WEAKNESS: ICD-10-CM

## 2021-03-23 DIAGNOSIS — R74.01 TRANSAMINITIS: ICD-10-CM

## 2021-03-23 DIAGNOSIS — R00.0 SINUS TACHYCARDIA: ICD-10-CM

## 2021-03-23 DIAGNOSIS — R79.89 ELEVATED D-DIMER: ICD-10-CM

## 2021-03-23 DIAGNOSIS — R79.89 ELEVATED BRAIN NATRIURETIC PEPTIDE (BNP) LEVEL: ICD-10-CM

## 2021-03-23 DIAGNOSIS — R06.09 DYSPNEA ON EXERTION: Primary | ICD-10-CM

## 2021-03-23 LAB
ALBUMIN SERPL-MCNC: 3.4 G/DL (ref 3.5–5)
ALBUMIN/GLOB SERPL: 1 {RATIO} (ref 1.1–2.2)
ALP SERPL-CCNC: 464 U/L (ref 45–117)
ALT SERPL-CCNC: 85 U/L (ref 12–78)
AMPHET UR QL SCN: NEGATIVE
ANION GAP SERPL CALC-SCNC: 9 MMOL/L (ref 5–15)
APPEARANCE UR: CLEAR
AST SERPL-CCNC: 61 U/L (ref 15–37)
BACTERIA URNS QL MICRO: NEGATIVE /HPF
BARBITURATES UR QL SCN: NEGATIVE
BASOPHILS # BLD: 0 K/UL (ref 0–0.1)
BASOPHILS NFR BLD: 1 % (ref 0–1)
BENZODIAZ UR QL: NEGATIVE
BILIRUB SERPL-MCNC: 0.6 MG/DL (ref 0.2–1)
BILIRUB UR QL: NEGATIVE
BNP SERPL-MCNC: 1598 PG/ML
BUN SERPL-MCNC: 71 MG/DL (ref 6–20)
BUN/CREAT SERPL: 30 (ref 12–20)
CALCIUM SERPL-MCNC: 8.9 MG/DL (ref 8.5–10.1)
CANNABINOIDS UR QL SCN: NEGATIVE
CHLORIDE SERPL-SCNC: 105 MMOL/L (ref 97–108)
CO2 SERPL-SCNC: 19 MMOL/L (ref 21–32)
COCAINE UR QL SCN: NEGATIVE
COLOR UR: ABNORMAL
COVID-19 RAPID TEST, COVR: NOT DETECTED
CREAT SERPL-MCNC: 2.38 MG/DL (ref 0.55–1.02)
D DIMER PPP FEU-MCNC: 7.89 MG/L FEU (ref 0–0.65)
DIFFERENTIAL METHOD BLD: ABNORMAL
DRUG SCRN COMMENT,DRGCM: NORMAL
EOSINOPHIL # BLD: 0.3 K/UL (ref 0–0.4)
EOSINOPHIL NFR BLD: 7 % (ref 0–7)
EPITH CASTS URNS QL MICRO: ABNORMAL /LPF
ERYTHROCYTE [DISTWIDTH] IN BLOOD BY AUTOMATED COUNT: 13.7 % (ref 11.5–14.5)
GLOBULIN SER CALC-MCNC: 3.5 G/DL (ref 2–4)
GLUCOSE SERPL-MCNC: 102 MG/DL (ref 65–100)
GLUCOSE UR STRIP.AUTO-MCNC: NEGATIVE MG/DL
HCT VFR BLD AUTO: 34.8 % (ref 35–47)
HGB BLD-MCNC: 11.2 G/DL (ref 11.5–16)
HGB UR QL STRIP: NEGATIVE
IMM GRANULOCYTES # BLD AUTO: 0 K/UL (ref 0–0.04)
IMM GRANULOCYTES NFR BLD AUTO: 1 % (ref 0–0.5)
KETONES UR QL STRIP.AUTO: NEGATIVE MG/DL
LEUKOCYTE ESTERASE UR QL STRIP.AUTO: ABNORMAL
LYMPHOCYTES # BLD: 0.7 K/UL (ref 0.8–3.5)
LYMPHOCYTES NFR BLD: 16 % (ref 12–49)
MCH RBC QN AUTO: 29.9 PG (ref 26–34)
MCHC RBC AUTO-ENTMCNC: 32.2 G/DL (ref 30–36.5)
MCV RBC AUTO: 92.8 FL (ref 80–99)
METHADONE UR QL: NEGATIVE
MONOCYTES # BLD: 0.5 K/UL (ref 0–1)
MONOCYTES NFR BLD: 10 % (ref 5–13)
NEUTS SEG # BLD: 3.1 K/UL (ref 1.8–8)
NEUTS SEG NFR BLD: 65 % (ref 32–75)
NITRITE UR QL STRIP.AUTO: NEGATIVE
NRBC # BLD: 0 K/UL (ref 0–0.01)
NRBC BLD-RTO: 0 PER 100 WBC
OPIATES UR QL: NEGATIVE
PCP UR QL: NEGATIVE
PH UR STRIP: 5 [PH] (ref 5–8)
PLATELET # BLD AUTO: 199 K/UL (ref 150–400)
PMV BLD AUTO: 9.9 FL (ref 8.9–12.9)
POTASSIUM SERPL-SCNC: 5.2 MMOL/L (ref 3.5–5.1)
PROT SERPL-MCNC: 6.9 G/DL (ref 6.4–8.2)
PROT UR STRIP-MCNC: NEGATIVE MG/DL
RBC # BLD AUTO: 3.75 M/UL (ref 3.8–5.2)
RBC #/AREA URNS HPF: ABNORMAL /HPF (ref 0–5)
RBC MORPH BLD: ABNORMAL
SODIUM SERPL-SCNC: 133 MMOL/L (ref 136–145)
SOURCE, COVRS: NORMAL
SP GR UR REFRACTOMETRY: 1.01 (ref 1–1.03)
TROPONIN I SERPL-MCNC: <0.05 NG/ML
TROPONIN I SERPL-MCNC: <0.05 NG/ML
UA: UC IF INDICATED,UAUC: ABNORMAL
UROBILINOGEN UR QL STRIP.AUTO: 0.2 EU/DL (ref 0.2–1)
WBC # BLD AUTO: 4.6 K/UL (ref 3.6–11)
WBC URNS QL MICRO: ABNORMAL /HPF (ref 0–4)

## 2021-03-23 PROCEDURE — 93005 ELECTROCARDIOGRAM TRACING: CPT

## 2021-03-23 PROCEDURE — 96361 HYDRATE IV INFUSION ADD-ON: CPT

## 2021-03-23 PROCEDURE — 80053 COMPREHEN METABOLIC PANEL: CPT

## 2021-03-23 PROCEDURE — 87635 SARS-COV-2 COVID-19 AMP PRB: CPT

## 2021-03-23 PROCEDURE — 94762 N-INVAS EAR/PLS OXIMTRY CONT: CPT

## 2021-03-23 PROCEDURE — 81001 URINALYSIS AUTO W/SCOPE: CPT

## 2021-03-23 PROCEDURE — 74011250637 HC RX REV CODE- 250/637: Performed by: STUDENT IN AN ORGANIZED HEALTH CARE EDUCATION/TRAINING PROGRAM

## 2021-03-23 PROCEDURE — 71045 X-RAY EXAM CHEST 1 VIEW: CPT

## 2021-03-23 PROCEDURE — 84484 ASSAY OF TROPONIN QUANT: CPT

## 2021-03-23 PROCEDURE — 78580 LUNG PERFUSION IMAGING: CPT

## 2021-03-23 PROCEDURE — 99285 EMERGENCY DEPT VISIT HI MDM: CPT

## 2021-03-23 PROCEDURE — 83880 ASSAY OF NATRIURETIC PEPTIDE: CPT

## 2021-03-23 PROCEDURE — 85025 COMPLETE CBC W/AUTO DIFF WBC: CPT

## 2021-03-23 PROCEDURE — 74011250636 HC RX REV CODE- 250/636: Performed by: STUDENT IN AN ORGANIZED HEALTH CARE EDUCATION/TRAINING PROGRAM

## 2021-03-23 PROCEDURE — 36415 COLL VENOUS BLD VENIPUNCTURE: CPT

## 2021-03-23 PROCEDURE — 85379 FIBRIN DEGRADATION QUANT: CPT

## 2021-03-23 PROCEDURE — 71250 CT THORAX DX C-: CPT

## 2021-03-23 PROCEDURE — 80307 DRUG TEST PRSMV CHEM ANLYZR: CPT

## 2021-03-23 PROCEDURE — 99218 HC RM OBSERVATION: CPT

## 2021-03-23 RX ORDER — ACETAMINOPHEN 325 MG/1
650 TABLET ORAL
Status: DISCONTINUED | OUTPATIENT
Start: 2021-03-23 | End: 2021-03-26 | Stop reason: HOSPADM

## 2021-03-23 RX ORDER — SODIUM CHLORIDE 0.9 % (FLUSH) 0.9 %
5-40 SYRINGE (ML) INJECTION AS NEEDED
Status: DISCONTINUED | OUTPATIENT
Start: 2021-03-23 | End: 2021-03-26 | Stop reason: HOSPADM

## 2021-03-23 RX ORDER — ASPIRIN 325 MG/1
200 TABLET, FILM COATED ORAL DAILY
Status: DISCONTINUED | OUTPATIENT
Start: 2021-03-23 | End: 2021-03-26 | Stop reason: HOSPADM

## 2021-03-23 RX ORDER — SODIUM CHLORIDE 0.9 % (FLUSH) 0.9 %
5-40 SYRINGE (ML) INJECTION EVERY 8 HOURS
Status: DISCONTINUED | OUTPATIENT
Start: 2021-03-23 | End: 2021-03-26 | Stop reason: HOSPADM

## 2021-03-23 RX ORDER — ACETAMINOPHEN 650 MG/1
650 SUPPOSITORY RECTAL
Status: DISCONTINUED | OUTPATIENT
Start: 2021-03-23 | End: 2021-03-26 | Stop reason: HOSPADM

## 2021-03-23 RX ORDER — ONDANSETRON 2 MG/ML
4 INJECTION INTRAMUSCULAR; INTRAVENOUS
Status: DISCONTINUED | OUTPATIENT
Start: 2021-03-23 | End: 2021-03-23 | Stop reason: SDUPTHER

## 2021-03-23 RX ORDER — ONDANSETRON 2 MG/ML
4 INJECTION INTRAMUSCULAR; INTRAVENOUS
Status: DISCONTINUED | OUTPATIENT
Start: 2021-03-23 | End: 2021-03-26 | Stop reason: HOSPADM

## 2021-03-23 RX ORDER — HEPARIN SODIUM 5000 [USP'U]/ML
7500 INJECTION, SOLUTION INTRAVENOUS; SUBCUTANEOUS EVERY 8 HOURS
Status: DISCONTINUED | OUTPATIENT
Start: 2021-03-24 | End: 2021-03-26 | Stop reason: HOSPADM

## 2021-03-23 RX ORDER — PREGABALIN 25 MG/1
25 CAPSULE ORAL 2 TIMES DAILY
Status: DISCONTINUED | OUTPATIENT
Start: 2021-03-23 | End: 2021-03-26 | Stop reason: HOSPADM

## 2021-03-23 RX ORDER — PROMETHAZINE HYDROCHLORIDE 25 MG/1
12.5 TABLET ORAL
Status: DISCONTINUED | OUTPATIENT
Start: 2021-03-23 | End: 2021-03-26 | Stop reason: HOSPADM

## 2021-03-23 RX ORDER — POLYETHYLENE GLYCOL 3350 17 G/17G
17 POWDER, FOR SOLUTION ORAL DAILY PRN
Status: DISCONTINUED | OUTPATIENT
Start: 2021-03-23 | End: 2021-03-26 | Stop reason: HOSPADM

## 2021-03-23 RX ORDER — ACETAMINOPHEN 325 MG/1
650 TABLET ORAL
Status: DISCONTINUED | OUTPATIENT
Start: 2021-03-23 | End: 2021-03-23 | Stop reason: SDUPTHER

## 2021-03-23 RX ORDER — SODIUM CHLORIDE, SODIUM LACTATE, POTASSIUM CHLORIDE, CALCIUM CHLORIDE 600; 310; 30; 20 MG/100ML; MG/100ML; MG/100ML; MG/100ML
75 INJECTION, SOLUTION INTRAVENOUS CONTINUOUS
Status: DISCONTINUED | OUTPATIENT
Start: 2021-03-23 | End: 2021-03-24

## 2021-03-23 RX ADMIN — Medication 200 MG: at 23:21

## 2021-03-23 RX ADMIN — PREGABALIN 25 MG: 25 CAPSULE ORAL at 23:21

## 2021-03-23 RX ADMIN — SODIUM CHLORIDE, POTASSIUM CHLORIDE, SODIUM LACTATE AND CALCIUM CHLORIDE 75 ML/HR: 600; 310; 30; 20 INJECTION, SOLUTION INTRAVENOUS at 23:22

## 2021-03-23 NOTE — ED PROVIDER NOTES
EMERGENCY DEPARTMENT HISTORY AND PHYSICAL EXAM      Date: 3/23/2021  Patient Name: Jo-Ann Bunch    History of Presenting Illness     Chief Complaint   Patient presents with    Fatigue     She fell 2 weeks ago and ever since then has been very fatigued and doesn't feel like doing anything. She has fibromyalgia and her feet have been very painful.  Fall    Foot Pain     No pain at this time. History Provided By: Patient    HPI: Jo-Ann Bunch, 70 y.o. female with past medical history of hypertension, fibromyalgia, morbid obesity, CKD, chronic low back pain with bilateral lower extremity radiculopathy, presents to the ED with cc of generalized fatigue for several weeks as well as shortness of breath. Patient states that she has been having increased fatigue over the past several weeks and has not had energy to do anything. Patient reports decreased exercise tolerance, with severe dyspnea on exertion. States that she has had bilateral foot swelling over the past 3 days. Denies unexpected weight gain. Denies orthopnea. Denies previous diagnosis of CHF. In addition, states that she has been having on and off bilateral lower extremity pain, which she describes as \"pins-and-needles\" which is similar to her previous radicular pain. Reports that she has had difficulty with her mobility, and that she has been more prone to falls. Reports a fall approximately 2 weeks ago. Denies any injury from this fall. She has no chest pain at this time. Patient reports that she has been trying to follow-up with her primary care physician regarding her ongoing and progressive symptoms, however, she was advised that they would not be able to schedule her in until May of this year. She denies fevers or chills. No cough, URI symptoms, abdominal pain, nausea, vomiting, diarrhea, or urinary complaints. PCP: Franky Roberto MD    No current facility-administered medications on file prior to encounter.       Current Outpatient Medications on File Prior to Encounter   Medication Sig Dispense Refill    furosemide (LASIX) 20 mg tablet TAKE 1 TABLET BY MOUTH ONCE DAILY AS NEEDED FOR  SWELLING 90 Tab 0    lisinopriL (PRINIVIL, ZESTRIL) 10 mg tablet Take 1 Tab by mouth daily. 90 Tab 3    traMADoL (ULTRAM) 50 mg tablet TAKE 1 TABLET BY MOUTH EVERY 6 HOURS AS NEEDED FOR PAIN UP TO  30 DAYS . DO NOT EXCEED 4 PER 24 HOURS 30 Tab 0    naproxen (NAPROSYN) 500 mg tablet Take 500 mg by mouth two (2) times daily (with meals).  pregabalin (Lyrica) 25 mg capsule Take 25 mg by mouth as needed.  cetirizine (ZyrTEC) 10 mg tablet Take 5 mg by mouth two (2) times a day.  fluticasone propionate (Flonase Allergy Relief) 50 mcg/actuation nasal spray 2 Sprays by Both Nostrils route daily.  LORazepam (ATIVAN) 1 mg tablet Take one pill 1.5 hours prior to injection      acetaminophen (TYLENOL) 325 mg tablet Take  by mouth every four (4) hours as needed for Pain. 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    Fibromyalgia     Hypertension        Past Surgical History:  Past Surgical History:   Procedure Laterality Date    HX COLONOSCOPY      HX DILATION AND CURETTAGE      HX TONSILLECTOMY      as a child       Family History:  Family History   Problem Relation Age of Onset    Diabetes Mother     Heart Disease Mother     Hypertension Mother     Hypertension Father     Heart Disease Father        Social History:  Social History     Tobacco Use    Smoking status: Never Smoker    Smokeless tobacco: Never Used   Substance Use Topics    Alcohol use: No    Drug use: Never       Allergies:  No Known Allergies      Review of Systems   Review of Systems   Constitutional: Positive for fatigue.  Negative for chills, fever and unexpected weight change. HENT: Negative for congestion and rhinorrhea. Eyes: Negative for visual disturbance. Respiratory: Positive for shortness of breath. Negative for cough, chest tightness and wheezing. Cardiovascular: Positive for leg swelling. Negative for chest pain and palpitations. Gastrointestinal: Negative for abdominal pain, diarrhea, nausea and vomiting. Genitourinary: Negative for dysuria, flank pain and hematuria. Musculoskeletal: Negative for back pain and neck pain. Radiculopathy pain in bilateral lower extremities   Skin: Negative for rash. Allergic/Immunologic: Negative for immunocompromised state. Neurological: Negative for dizziness, seizures, syncope, speech difficulty, weakness, numbness and headaches. Hematological: Negative for adenopathy. Psychiatric/Behavioral: Negative for dysphoric mood and suicidal ideas. Physical Exam   Physical Exam  Vitals signs and nursing note reviewed. Constitutional:       General: She is not in acute distress. Appearance: She is obese. She is not ill-appearing or toxic-appearing. HENT:      Head: Normocephalic and atraumatic. Nose: Nose normal.      Mouth/Throat:      Mouth: Mucous membranes are moist.   Eyes:      Extraocular Movements: Extraocular movements intact. Pupils: Pupils are equal, round, and reactive to light. Neck:      Musculoskeletal: Normal range of motion and neck supple. Cardiovascular:      Rate and Rhythm: Regular rhythm. Tachycardia present. Pulses: Normal pulses. Pulmonary:      Effort: Pulmonary effort is normal.      Breath sounds: No stridor. No wheezing or rhonchi. Abdominal:      General: Abdomen is flat. There is no distension. Tenderness: There is no abdominal tenderness. Musculoskeletal: Normal range of motion. Right lower leg: No edema. Left lower leg: No edema. Skin:     General: Skin is warm and dry. Neurological:      General: No focal deficit present. Mental Status: She is alert and oriented to person, place, and time. Psychiatric:         Judgment: Judgment normal.         Diagnostic Study Results     Labs -     Recent Results (from the past 24 hour(s))   EKG, 12 LEAD, INITIAL    Collection Time: 03/23/21  3:51 PM   Result Value Ref Range    Ventricular Rate 91 BPM    Atrial Rate 91 BPM    P-R Interval 228 ms    QRS Duration 104 ms    Q-T Interval 344 ms    QTC Calculation (Bezet) 423 ms    Calculated P Axis 34 degrees    Calculated R Axis -65 degrees    Calculated T Axis 43 degrees    Diagnosis       Sinus rhythm with 1st degree AV block  Left axis deviation  Inferior infarct , age undetermined  Anterolateral infarct (cited on or before 16-MAY-2010)  When compared with ECG of 16-MAY-2010 08:38,  Significant changes have occurred     CBC WITH AUTOMATED DIFF    Collection Time: 03/23/21  3:59 PM   Result Value Ref Range    WBC 4.6 3.6 - 11.0 K/uL    RBC 3.75 (L) 3.80 - 5.20 M/uL    HGB 11.2 (L) 11.5 - 16.0 g/dL    HCT 34.8 (L) 35.0 - 47.0 %    MCV 92.8 80.0 - 99.0 FL    MCH 29.9 26.0 - 34.0 PG    MCHC 32.2 30.0 - 36.5 g/dL    RDW 13.7 11.5 - 14.5 %    PLATELET 733 638 - 534 K/uL    MPV 9.9 8.9 - 12.9 FL    NRBC 0.0 0  WBC    ABSOLUTE NRBC 0.00 0.00 - 0.01 K/uL    NEUTROPHILS 65 32 - 75 %    LYMPHOCYTES 16 12 - 49 %    MONOCYTES 10 5 - 13 %    EOSINOPHILS 7 0 - 7 %    BASOPHILS 1 0 - 1 %    IMMATURE GRANULOCYTES 1 (H) 0.0 - 0.5 %    ABS. NEUTROPHILS 3.1 1.8 - 8.0 K/UL    ABS. LYMPHOCYTES 0.7 (L) 0.8 - 3.5 K/UL    ABS. MONOCYTES 0.5 0.0 - 1.0 K/UL    ABS. EOSINOPHILS 0.3 0.0 - 0.4 K/UL    ABS. BASOPHILS 0.0 0.0 - 0.1 K/UL    ABS. IMM.  GRANS. 0.0 0.00 - 0.04 K/UL    DF SMEAR SCANNED      RBC COMMENTS NORMOCYTIC, NORMOCHROMIC     METABOLIC PANEL, COMPREHENSIVE    Collection Time: 03/23/21  3:59 PM   Result Value Ref Range    Sodium 133 (L) 136 - 145 mmol/L    Potassium 5.2 (H) 3.5 - 5.1 mmol/L    Chloride 105 97 - 108 mmol/L    CO2 19 (L) 21 - 32 mmol/L Anion gap 9 5 - 15 mmol/L    Glucose 102 (H) 65 - 100 mg/dL    BUN 71 (H) 6 - 20 MG/DL    Creatinine 2.38 (H) 0.55 - 1.02 MG/DL    BUN/Creatinine ratio 30 (H) 12 - 20      GFR est AA 24 (L) >60 ml/min/1.73m2    GFR est non-AA 20 (L) >60 ml/min/1.73m2    Calcium 8.9 8.5 - 10.1 MG/DL    Bilirubin, total 0.6 0.2 - 1.0 MG/DL    ALT (SGPT) 85 (H) 12 - 78 U/L    AST (SGOT) 61 (H) 15 - 37 U/L    Alk. phosphatase 464 (H) 45 - 117 U/L    Protein, total 6.9 6.4 - 8.2 g/dL    Albumin 3.4 (L) 3.5 - 5.0 g/dL    Globulin 3.5 2.0 - 4.0 g/dL    A-G Ratio 1.0 (L) 1.1 - 2.2     TROPONIN I    Collection Time: 03/23/21  3:59 PM   Result Value Ref Range    Troponin-I, Qt. <0.05 <0.05 ng/mL   NT-PRO BNP    Collection Time: 03/23/21  3:59 PM   Result Value Ref Range    NT pro-BNP 1,598 (H) <125 PG/ML   D DIMER    Collection Time: 03/23/21  3:59 PM   Result Value Ref Range    D-dimer 7.89 (H) 0.00 - 0.65 mg/L FEU   URINALYSIS W/ REFLEX CULTURE    Collection Time: 03/23/21  5:07 PM    Specimen: Urine   Result Value Ref Range    Color YELLOW/STRAW      Appearance CLEAR CLEAR      Specific gravity 1.008 1.003 - 1.030      pH (UA) 5.0 5.0 - 8.0      Protein Negative NEG mg/dL    Glucose Negative NEG mg/dL    Ketone Negative NEG mg/dL    Bilirubin Negative NEG      Blood Negative NEG      Urobilinogen 0.2 0.2 - 1.0 EU/dL    Nitrites Negative NEG      Leukocyte Esterase SMALL (A) NEG      WBC 0-4 0 - 4 /hpf    RBC 0-5 0 - 5 /hpf    Epithelial cells FEW FEW /lpf    Bacteria Negative NEG /hpf    UA:UC IF INDICATED CULTURE NOT INDICATED BY UA RESULT CNI     TROPONIN I    Collection Time: 03/23/21  6:36 PM   Result Value Ref Range    Troponin-I, Qt. <0.05 <0.05 ng/mL   COVID-19 RAPID TEST    Collection Time: 03/23/21  8:30 PM   Result Value Ref Range    Specimen source Nasopharyngeal      COVID-19 rapid test Not detected NOTD         Radiologic Studies -   CT CHEST WO CONT   Final Result   No acute cardiopulmonary process.  Coronary atherosclerotic disease. NM LUNG SCAN PERF         XR CHEST PORT   Final Result   No evidence of acute cardiopulmonary process. US ABD COMP    (Results Pending)     CT Results  (Last 48 hours)               03/23/21 2100  CT CHEST WO CONT Final result    Impression:  No acute cardiopulmonary process. Coronary atherosclerotic disease. Narrative:  INDICATION: Shortness of breath       COMPARISON: No comparisons       CONTRAST: None. TECHNIQUE:  5 mm axial images were obtained through the chest CTs of. Coronal   and sagittal reformats were generated. CT dose reduction was achieved through   use of a standardized protocol tailored for this examination and automatic   exposure control for dose modulation. The absence of intravenous contrast reduces the sensitivity for evaluation of   the mediastinum, chaparrita, vasculature, and upper abdominal organs. FINDINGS:       CHEST WALL: No mass or axillary lymphadenopathy. THYROID: No nodule. MEDIASTINUM: No mass or lymphadenopathy. CHAPARRITA: No mass or lymphadenopathy. THORACIC AORTA: No aneurysm. MAIN PULMONARY ARTERY: Normal in caliber. TRACHEA/BRONCHI: Patent. ESOPHAGUS: No wall thickening or dilatation. HEART: Coronary atherosclerotic disease. Normal heart size. PLEURA: No effusion or pneumothorax. LUNGS: No nodule, mass, or airspace disease. INCIDENTALLY IMAGED UPPER ABDOMEN: No significant abnormality in the   incidentally imaged upper abdomen. BONES: Multilevel degenerative changes in the thoracic spine. CXR Results  (Last 48 hours)               03/23/21 1609  XR CHEST PORT Final result    Impression:  No evidence of acute cardiopulmonary process. Narrative:  INDICATION:  sob        FINDINGS: Single AP portable view of the chest obtained at 1554 demonstrates a   normal cardiomediastinal silhouette. The lungs are clear bilaterally. No osseous   abnormalities are seen.                  Medical Decision Making   I am the first provider for this patient. I reviewed the vital signs, available nursing notes, past medical history, past surgical history, family history and social history. Vital Signs-Reviewed the patient's vital signs. Patient Vitals for the past 24 hrs:   Temp Pulse Resp BP SpO2   03/23/21 1334 97.7 °F (36.5 °C) (!) 112 20 (!) 106/50 99 %       EKG interpretation: (Preliminary)  Sinus rhythm, first-degree AV block, rate of 91. Nonspecific ST changes. EKG appears unchanged compared to her previous from 2010. EKG interpretation by Graham Marie MD    Records Reviewed: Nursing Notes and Old Medical Records    Provider Notes (Medical Decision Making):   79-year-old female presenting for evaluation of multiple complaints including generalized malaise and fatigue, decreased exercise tolerance, increased dyspnea on exertion that has been progressive over recent weeks, bilateral lower extremity pins-and-needles pain. In the ED, patient is tachycardic, breathing comfortably and satting 100% on room air, with low normal blood pressures. She is alert and oriented x3, nontoxic in appearance, in no acute distress. She has no significant lower extremity edema. Neurovascularly intact. No unilateral calf tenderness or swelling. Breath sounds are clear and present bilaterally. Patient's work-up in the ED is notable for mild hyponatremia 133, mild hyperkalemia of 5.2. Bicarb is mildly decreased at 19. She has clinically significant MARIELENA on CKD with creatinine of 2.38, BUN of 71. Her baseline creatinine is between 1.1 to 1.2. Patient states that she has been told by her nephrologist in the past to avoid NSAIDs, and that she has largely avoided taking NSAIDs and mostly been using Tylenol. She in addition does not feel that she is dehydrated and endorses drinking \"at least 3 large bottles of water a day\", which patient reports is unchanged.   Labs also demonstrate new elevations in LFTs with ALT of 85, AST of 61, and alkaline phosphatase of 464. Unclear significance. Patient currently not having right upper quadrant abdominal pain, denies all GI symptoms including nausea, vomiting, diarrhea. Patient thinks that her transaminitis is secondary to excessive Tylenol use. States that she has been using lots of Tylenol essentially daily for her radicular pain in the legs/feet. Her EKG is without evidence of acute ischemia. Troponin is negative. proBNP is elevated at 1598. D-dimer is significantly elevated at 7.89. Due to patient's severe MARIELENA, CTA chest was not able to be obtained. VQ scan was subsequently ordered, which revealed low probability for PE. Her chest x-ray is negative for acute findings. CT chest without contrast again without acute cardiopulmonary process. UA is negative for infection. COVID-19 rapid test is negative. Despite patient satting 100% on room air at rest, RN reported patient did desaturate to 89% on room air after ambulating from her room to the bathroom. In setting of elevated proBNP, patient's symptoms could possibly be secondary to undiagnosed/new onset CHF? She will require further work-up for this with echocardiogram.  Due to possibility of CHF, patient was not given IV fluids for MARIELENA in the ED. Patient was discussed with the hospitalist, who has accepted her for admission in stable condition at this time for further evaluation and treatment. Liv Vuong MD      Disposition:  Admit      Diagnosis     Clinical Impression:   1. Dyspnea on exertion    2. MARIELENA (acute kidney injury) ([facility] [address]    3. Transaminitis    4. Elevated d-dimer    5. Generalized weakness    6. Sinus tachycardia    7. Elevated brain natriuretic peptide (BNP) level        Attestations:    Liv Vuong MD    Please note that this dictation was completed with [employer] the [employer] voice recognition software.   Quite often unanticipated grammatical, syntax, homophones, and other interpretive errors are inadvertently transcribed by the computer software. Please disregard these errors. Please excuse any errors that have escaped final proofreading. Thank you.

## 2021-03-24 ENCOUNTER — APPOINTMENT (OUTPATIENT)
Dept: ULTRASOUND IMAGING | Age: 76
DRG: 315 | End: 2021-03-24
Attending: STUDENT IN AN ORGANIZED HEALTH CARE EDUCATION/TRAINING PROGRAM
Payer: MEDICARE

## 2021-03-24 ENCOUNTER — APPOINTMENT (OUTPATIENT)
Dept: NON INVASIVE DIAGNOSTICS | Age: 76
DRG: 315 | End: 2021-03-24
Attending: STUDENT IN AN ORGANIZED HEALTH CARE EDUCATION/TRAINING PROGRAM
Payer: MEDICARE

## 2021-03-24 PROBLEM — I95.9 HYPOTENSION (ARTERIAL): Status: ACTIVE | Noted: 2021-03-24

## 2021-03-24 LAB
ALBUMIN SERPL-MCNC: 3.4 G/DL (ref 3.5–5)
ALBUMIN/GLOB SERPL: 1.2 {RATIO} (ref 1.1–2.2)
ALP SERPL-CCNC: 345 U/L (ref 45–117)
ALT SERPL-CCNC: 62 U/L (ref 12–78)
ANION GAP SERPL CALC-SCNC: 11 MMOL/L (ref 5–15)
AST SERPL-CCNC: 34 U/L (ref 15–37)
ATRIAL RATE: 91 BPM
BASOPHILS # BLD: 0.1 K/UL (ref 0–0.1)
BASOPHILS NFR BLD: 1 % (ref 0–1)
BILIRUB SERPL-MCNC: 0.5 MG/DL (ref 0.2–1)
BUN SERPL-MCNC: 69 MG/DL (ref 6–20)
BUN/CREAT SERPL: 30 (ref 12–20)
CALCIUM SERPL-MCNC: 8.9 MG/DL (ref 8.5–10.1)
CALCULATED P AXIS, ECG09: 34 DEGREES
CALCULATED R AXIS, ECG10: -65 DEGREES
CALCULATED T AXIS, ECG11: 43 DEGREES
CHLORIDE SERPL-SCNC: 105 MMOL/L (ref 97–108)
CO2 SERPL-SCNC: 17 MMOL/L (ref 21–32)
CORTIS SERPL-MCNC: 6.9 UG/DL
CREAT SERPL-MCNC: 2.27 MG/DL (ref 0.55–1.02)
CRP SERPL-MCNC: 1.45 MG/DL (ref 0–0.6)
DIAGNOSIS, 93000: NORMAL
DIFFERENTIAL METHOD BLD: ABNORMAL
EOSINOPHIL # BLD: 0.4 K/UL (ref 0–0.4)
EOSINOPHIL NFR BLD: 8 % (ref 0–7)
ERYTHROCYTE [DISTWIDTH] IN BLOOD BY AUTOMATED COUNT: 13.6 % (ref 11.5–14.5)
ERYTHROCYTE [SEDIMENTATION RATE] IN BLOOD: 34 MM/HR (ref 0–30)
GLOBULIN SER CALC-MCNC: 2.8 G/DL (ref 2–4)
GLUCOSE SERPL-MCNC: 85 MG/DL (ref 65–100)
HCT VFR BLD AUTO: 28.7 % (ref 35–47)
HGB BLD-MCNC: 9.2 G/DL (ref 11.5–16)
IMM GRANULOCYTES # BLD AUTO: 0 K/UL (ref 0–0.04)
IMM GRANULOCYTES NFR BLD AUTO: 1 % (ref 0–0.5)
LACTATE SERPL-SCNC: 1 MMOL/L (ref 0.4–2)
LYMPHOCYTES # BLD: 1.3 K/UL (ref 0.8–3.5)
LYMPHOCYTES NFR BLD: 24 % (ref 12–49)
MAGNESIUM SERPL-MCNC: 2.2 MG/DL (ref 1.6–2.4)
MCH RBC QN AUTO: 30.3 PG (ref 26–34)
MCHC RBC AUTO-ENTMCNC: 32.1 G/DL (ref 30–36.5)
MCV RBC AUTO: 94.4 FL (ref 80–99)
MONOCYTES # BLD: 0.7 K/UL (ref 0–1)
MONOCYTES NFR BLD: 13 % (ref 5–13)
NEUTS SEG # BLD: 2.8 K/UL (ref 1.8–8)
NEUTS SEG NFR BLD: 53 % (ref 32–75)
NRBC # BLD: 0 K/UL (ref 0–0.01)
NRBC BLD-RTO: 0 PER 100 WBC
P-R INTERVAL, ECG05: 228 MS
PLATELET # BLD AUTO: 200 K/UL (ref 150–400)
PMV BLD AUTO: 10 FL (ref 8.9–12.9)
POTASSIUM SERPL-SCNC: 4.6 MMOL/L (ref 3.5–5.1)
PROCALCITONIN SERPL-MCNC: 0.55 NG/ML
PROT SERPL-MCNC: 6.2 G/DL (ref 6.4–8.2)
Q-T INTERVAL, ECG07: 344 MS
QRS DURATION, ECG06: 104 MS
QTC CALCULATION (BEZET), ECG08: 423 MS
RBC # BLD AUTO: 3.04 M/UL (ref 3.8–5.2)
SODIUM SERPL-SCNC: 133 MMOL/L (ref 136–145)
T4 FREE SERPL-MCNC: 1.1 NG/DL (ref 0.8–1.5)
TSH SERPL DL<=0.05 MIU/L-ACNC: 1.35 UIU/ML (ref 0.36–3.74)
VENTRICULAR RATE, ECG03: 91 BPM
WBC # BLD AUTO: 5.3 K/UL (ref 3.6–11)

## 2021-03-24 PROCEDURE — 85025 COMPLETE CBC W/AUTO DIFF WBC: CPT

## 2021-03-24 PROCEDURE — 96372 THER/PROPH/DIAG INJ SC/IM: CPT

## 2021-03-24 PROCEDURE — 74011250636 HC RX REV CODE- 250/636: Performed by: INTERNAL MEDICINE

## 2021-03-24 PROCEDURE — 87040 BLOOD CULTURE FOR BACTERIA: CPT

## 2021-03-24 PROCEDURE — 99218 HC RM OBSERVATION: CPT

## 2021-03-24 PROCEDURE — 84145 PROCALCITONIN (PCT): CPT

## 2021-03-24 PROCEDURE — 96361 HYDRATE IV INFUSION ADD-ON: CPT

## 2021-03-24 PROCEDURE — 74011250636 HC RX REV CODE- 250/636: Performed by: STUDENT IN AN ORGANIZED HEALTH CARE EDUCATION/TRAINING PROGRAM

## 2021-03-24 PROCEDURE — 36415 COLL VENOUS BLD VENIPUNCTURE: CPT

## 2021-03-24 PROCEDURE — 74011250636 HC RX REV CODE- 250/636: Performed by: NURSE PRACTITIONER

## 2021-03-24 PROCEDURE — 74011250637 HC RX REV CODE- 250/637: Performed by: STUDENT IN AN ORGANIZED HEALTH CARE EDUCATION/TRAINING PROGRAM

## 2021-03-24 PROCEDURE — 80053 COMPREHEN METABOLIC PANEL: CPT

## 2021-03-24 PROCEDURE — 85652 RBC SED RATE AUTOMATED: CPT

## 2021-03-24 PROCEDURE — 82533 TOTAL CORTISOL: CPT

## 2021-03-24 PROCEDURE — 65660000000 HC RM CCU STEPDOWN

## 2021-03-24 PROCEDURE — 76700 US EXAM ABDOM COMPLETE: CPT

## 2021-03-24 PROCEDURE — 96367 TX/PROPH/DG ADDL SEQ IV INF: CPT

## 2021-03-24 PROCEDURE — 93970 EXTREMITY STUDY: CPT

## 2021-03-24 PROCEDURE — 93306 TTE W/DOPPLER COMPLETE: CPT

## 2021-03-24 PROCEDURE — P9047 ALBUMIN (HUMAN), 25%, 50ML: HCPCS | Performed by: NURSE PRACTITIONER

## 2021-03-24 PROCEDURE — 83735 ASSAY OF MAGNESIUM: CPT

## 2021-03-24 PROCEDURE — 96365 THER/PROPH/DIAG IV INF INIT: CPT

## 2021-03-24 PROCEDURE — 96374 THER/PROPH/DIAG INJ IV PUSH: CPT

## 2021-03-24 PROCEDURE — 83605 ASSAY OF LACTIC ACID: CPT

## 2021-03-24 PROCEDURE — 84443 ASSAY THYROID STIM HORMONE: CPT

## 2021-03-24 PROCEDURE — 86140 C-REACTIVE PROTEIN: CPT

## 2021-03-24 PROCEDURE — 84439 ASSAY OF FREE THYROXINE: CPT

## 2021-03-24 PROCEDURE — 96366 THER/PROPH/DIAG IV INF ADDON: CPT

## 2021-03-24 RX ORDER — PREDNISONE 5 MG/1
TABLET ORAL
Qty: 147 TAB | Refills: 0 | Status: CANCELLED | OUTPATIENT
Start: 2021-03-24 | End: 2021-04-28

## 2021-03-24 RX ORDER — ALBUMIN HUMAN 250 G/1000ML
25 SOLUTION INTRAVENOUS ONCE
Status: COMPLETED | OUTPATIENT
Start: 2021-03-24 | End: 2021-03-24

## 2021-03-24 RX ORDER — SODIUM CHLORIDE 9 MG/ML
75 INJECTION, SOLUTION INTRAVENOUS CONTINUOUS
Status: DISCONTINUED | OUTPATIENT
Start: 2021-03-24 | End: 2021-03-26

## 2021-03-24 RX ADMIN — HEPARIN SODIUM 7500 UNITS: 5000 INJECTION INTRAVENOUS; SUBCUTANEOUS at 14:41

## 2021-03-24 RX ADMIN — HEPARIN SODIUM 7500 UNITS: 5000 INJECTION INTRAVENOUS; SUBCUTANEOUS at 06:41

## 2021-03-24 RX ADMIN — SODIUM CHLORIDE 75 ML/HR: 9 INJECTION, SOLUTION INTRAVENOUS at 12:29

## 2021-03-24 RX ADMIN — PHENYLEPHRINE HYDROCHLORIDE 30 MCG/MIN: 10 INJECTION INTRAVENOUS at 03:56

## 2021-03-24 RX ADMIN — Medication 10 ML: at 02:06

## 2021-03-24 RX ADMIN — Medication 10 ML: at 14:41

## 2021-03-24 RX ADMIN — SODIUM CHLORIDE, POTASSIUM CHLORIDE, SODIUM LACTATE AND CALCIUM CHLORIDE 250 ML: 600; 310; 30; 20 INJECTION, SOLUTION INTRAVENOUS at 01:02

## 2021-03-24 RX ADMIN — HEPARIN SODIUM 7500 UNITS: 5000 INJECTION INTRAVENOUS; SUBCUTANEOUS at 22:24

## 2021-03-24 RX ADMIN — ALBUMIN (HUMAN) 25 G: 0.25 INJECTION, SOLUTION INTRAVENOUS at 01:07

## 2021-03-24 RX ADMIN — PHENYLEPHRINE HYDROCHLORIDE 25 MCG/MIN: 10 INJECTION INTRAVENOUS at 05:56

## 2021-03-24 RX ADMIN — PREGABALIN 25 MG: 25 CAPSULE ORAL at 22:26

## 2021-03-24 RX ADMIN — PREGABALIN 25 MG: 25 CAPSULE ORAL at 09:57

## 2021-03-24 NOTE — PROGRESS NOTES
Received message from patient's nurse Salima Sy stating :    pt pressure was better, coming back down now, current pressure 95/35 map 44           Discussion / orders:    · EKG shows first-degree heart block was not present on prior EKG  · Start Jonathan-Synephrine drip  · Cardiology consult  · Upgrade status to stepdown         Please note that this note was dictated using Dragon computer voice recognition software. Quite often unanticipated grammatical, syntax, homophones, and other interpretive errors are inadvertently transcribed by the computer software. Please disregard these errors. Please excuse any errors that have escaped final proofreading.

## 2021-03-24 NOTE — H&P
.                  Hospitalist Admission Note    NAME: Nanette Car   :  1945   MRN:  [id number]     Date/Time:  3/23/2021 10:23 PM    Patient PCP: Mahendra Pollard MD  ______________________________________________________________________  Given the patient's current clinical presentation, I have a high level of concern for decompensation if discharged from the emergency department. Complex decision making was performed, which includes reviewing the patient's available past medical records, laboratory results, and x-ray films. My assessment of this patient's clinical condition and my plan of care is as follows. Assessment / Plan:    Shortness of breath  Sinus tachycardia  Elevated D-dimer  Bilateral foot pain  Patient work-up in the ED included 2 sets of troponins were which were negative, VQ scan which was low probability, nonenhanced CT of the chest which did not show any acute process, ECG showing poor R wave progression  We will admit for observation  We will check thyroid profile in the morning  We will perform echocardiogram  We will perform Doppler ultrasound of bilateral lower limbs  will check ESR and CRP in the a.m. Elevated transaminases  Acute kidney injury of chronic kidney disease stage III induced by NSAIDs  We will hold off lisinopril  We will start on gentle IV hydration  Repeat CMP in the morning  We will check ultrasound of the abdomen, with focus on liver and portal circulation, kidneys    Hypertension  Blood pressure at this point is borderline low, continue to observe    Fibromyalgia  Continue Lyrica    Morbid obesity BMI 44    Code Status: Full code  Surrogate Decision Maker: Son information on record    DVT Prophylaxis: Subcu heparin  GI Prophylaxis: not indicated    Baseline: Independent      Subjective:   CHIEF COMPLAINT: shortness of breath    HISTORY OF PRESENT ILLNESS:     Rosalina Johnson is a 70 y.o.    female who presents with complaints shortness of breath on excretion that had worsened over the past 4 weeks. No orthopnea, no PND. No heart palpiations. No chest pain. Foot pain that she describes like a million needles and has worsened in the last few weeks but had been going on for 15 years. Chronic back pains that she gets ESIs for   She had been getting naproxen to control her back and foot pains    She also described having difficulty raising hands and doing her hair. Has not worked since march and believes she had lost a lot of muscle power. Has been on and off Lyrica as she feels \"doupy\" when she takes it. And working with her primary provider to decrease the dose until she reached the 25 mg     She had a fall 2 weeks ago, feel straight on her knees after rushing out through her door way as her granddaughter opened it for her. Has history of HTN, fibromyalgia and CKD stage III secondary to NSAID use     In the ED her evaluation included VQ scan of the chest which was reported as low probability, her creatinine was more than 2 with EGFR less than 30, AST and ALT were mildly elevated alkaline phosphatase was 464, troponin was negative, proBNP was almost 1600. We were asked to admit for work up and evaluation of the above problems.      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 type    Fibromyalgia     Hypertension         Past Surgical History:   Procedure Laterality Date    HX COLONOSCOPY      HX DILATION AND CURETTAGE      HX TONSILLECTOMY      as a child       Social History     Tobacco Use    Smoking status: Never Smoker    Smokeless tobacco: Never Used   Substance Use Topics    Alcohol use: No        Family History   Problem Relation Age of Onset    Diabetes Mother     Heart Disease Mother     Hypertension Mother     Hypertension Father     Heart Disease Father No Known Allergies     Prior to Admission medications    Medication Sig Start Date End Date Taking? Authorizing Provider   furosemide (LASIX) 20 mg tablet TAKE 1 TABLET BY MOUTH ONCE DAILY AS NEEDED FOR  SWELLING 3/14/21   Geni Urbina MD   lisinopriL (PRINIVIL, ZESTRIL) 10 mg tablet Take 1 Tab by mouth daily. 1/18/21   Geni Urbina MD   traMADoL (ULTRAM) 50 mg tablet TAKE 1 TABLET BY MOUTH EVERY 6 HOURS AS NEEDED FOR PAIN UP TO  30 DAYS . DO NOT EXCEED 4 PER 24 HOURS 10/19/20 11/18/20  Geni Urbina MD   naproxen (NAPROSYN) 500 mg tablet Take 500 mg by mouth two (2) times daily (with meals). Provider, Historical   pregabalin (Lyrica) 25 mg capsule Take 25 mg by mouth as needed. Provider, Historical   cetirizine (ZyrTEC) 10 mg tablet Take 5 mg by mouth two (2) times a day. Provider, Historical   fluticasone propionate (Flonase Allergy Relief) 50 mcg/actuation nasal spray 2 Sprays by Both Nostrils route daily. Provider, Historical   LORazepam (ATIVAN) 1 mg tablet Take one pill 1.5 hours prior to injection 1/28/20   Provider, Historical   acetaminophen (TYLENOL) 325 mg tablet Take  by mouth every four (4) hours as needed for Pain. Provider, Historical       REVIEW OF SYSTEMS:     I am not able to complete the review of systems because:    The patient is intubated and sedated    The patient has altered mental status due to his acute medical problems    The patient has baseline aphasia from prior stroke(s)    The patient has baseline dementia and is not reliable historian    The patient is in acute medical distress and unable to provide information           Total of 12 systems reviewed as follows:       POSITIVE= underlined text  Negative = text not underlined  General:  fever, chills, sweats, generalized weakness, weight loss/gain,      loss of appetite   Eyes:    blurred vision, eye pain, loss of vision, double vision  ENT:    rhinorrhea, pharyngitis   Respiratory:   cough, sputum production, SOB, GATICA, wheezing, pleuritic pain   Cardiology:   chest pain, palpitations, orthopnea, PND, edema, syncope   Gastrointestinal:  abdominal pain , N/V, diarrhea, dysphagia, constipation, bleeding   Genitourinary:  frequency, urgency, dysuria, hematuria, incontinence   Muskuloskeletal :  arthralgia, myalgia, back pain  Hematology:  easy bruising, nose or gum bleeding, lymphadenopathy   Dermatological: rash, ulceration, pruritis, color change / jaundice  Endocrine:   hot flashes or polydipsia   Neurological:  headache, dizziness, confusion, focal weakness, paresthesia,     Speech difficulties, memory loss, gait difficulty  Psychological: Feelings of anxiety, depression, agitation    Objective:   VITALS:    Visit Vitals  BP (!) 132/92   Pulse (!) 110   Temp 97.7 °F (36.5 °C)   Resp 21   Ht 5' (1.524 m)   Wt 102.5 kg (225 lb 15.5 oz)   SpO2 100%   BMI 44.13 kg/m²       PHYSICAL EXAM:    General:    Alert, cooperative, obese,  no distress, appears stated age. HEENT: Atraumatic, anicteric sclerae, pink conjunctivae     No oral ulcers, mucosa moist, throat clear, dentition fair  Neck:  Supple, symmetrical,  thyroid: non tender  Lungs:   Clear to auscultation bilaterally. No Wheezing or Rhonchi. No rales. Chest wall:  No tenderness  No Accessory muscle use. Heart:   Mildly tachycardic, regular rhythm,  No  murmur   No edema  Abdomen:   Soft, non-tender. Not distended. Bowel sounds normal  Extremities: No cyanosis. No clubbing,      Skin turgor normal, Capillary refill normal, Radial dial pulse 2+  Skin:     Not pale. Not Jaundiced  No rashes   Psych:  Good insight. Not depressed. Not anxious or agitated. Neurologic: EOMs intact. No facial asymmetry. No aphasia or slurred speech. Symmetrical strength, Sensation grossly intact.  Alert and oriented X 4.     _______________________________________________________________________  Care Plan discussed with:    Comments   Patient x    Family      RN Care Manager                    Consultant:      _______________________________________________________________________  Expected  Disposition:   Home with Family X   HH/PT/OT/RN    SNF/LTC    JAYNA    ________________________________________________________________________  TOTAL TIME:  62 Minutes    Critical Care Provided     Minutes non procedure based      Comments    X Reviewed previous records   >50% of visit spent in counseling and coordination of care  Discussion with patient and/or family and questions answered       ________________________________________________________________________  Signed: Aurelia Collier MD    Procedures: see electronic medical records for all procedures/Xrays and details which were not copied into this note but were reviewed prior to creation of Plan.     LAB DATA REVIEWED:    Recent Results (from the past 24 hour(s))   EKG, 12 LEAD, INITIAL    Collection Time: 03/23/21  3:51 PM   Result Value Ref Range    Ventricular Rate 91 BPM    Atrial Rate 91 BPM    P-R Interval 228 ms    QRS Duration 104 ms    Q-T Interval 344 ms    QTC Calculation (Bezet) 423 ms    Calculated P Axis 34 degrees    Calculated R Axis -65 degrees    Calculated T Axis 43 degrees    Diagnosis       Sinus rhythm with 1st degree AV block  Left axis deviation  Inferior infarct , age undetermined  Anterolateral infarct (cited on or before 16-MAY-2010)  When compared with ECG of 16-MAY-2010 08:38,  Significant changes have occurred     CBC WITH AUTOMATED DIFF    Collection Time: 03/23/21  3:59 PM   Result Value Ref Range    WBC 4.6 3.6 - 11.0 K/uL    RBC 3.75 (L) 3.80 - 5.20 M/uL    HGB 11.2 (L) 11.5 - 16.0 g/dL    HCT 34.8 (L) 35.0 - 47.0 %    MCV 92.8 80.0 - 99.0 FL    MCH 29.9 26.0 - 34.0 PG    MCHC 32.2 30.0 - 36.5 g/dL    RDW 13.7 11.5 - 14.5 %    PLATELET 201 721 - 003 K/uL    MPV 9.9 8.9 - 12.9 FL    NRBC 0.0 0  WBC    ABSOLUTE NRBC 0.00 0.00 - 0.01 K/uL    NEUTROPHILS 65 32 - 75 %    LYMPHOCYTES 16 12 - 49 %    MONOCYTES 10 5 - 13 %    EOSINOPHILS 7 0 - 7 %    BASOPHILS 1 0 - 1 %    IMMATURE GRANULOCYTES 1 (H) 0.0 - 0.5 %    ABS. NEUTROPHILS 3.1 1.8 - 8.0 K/UL    ABS. LYMPHOCYTES 0.7 (L) 0.8 - 3.5 K/UL    ABS. MONOCYTES 0.5 0.0 - 1.0 K/UL    ABS. EOSINOPHILS 0.3 0.0 - 0.4 K/UL    ABS. BASOPHILS 0.0 0.0 - 0.1 K/UL    ABS. IMM. GRANS. 0.0 0.00 - 0.04 K/UL    DF SMEAR SCANNED      RBC COMMENTS NORMOCYTIC, NORMOCHROMIC     METABOLIC PANEL, COMPREHENSIVE    Collection Time: 03/23/21  3:59 PM   Result Value Ref Range    Sodium 133 (L) 136 - 145 mmol/L    Potassium 5.2 (H) 3.5 - 5.1 mmol/L    Chloride 105 97 - 108 mmol/L    CO2 19 (L) 21 - 32 mmol/L    Anion gap 9 5 - 15 mmol/L    Glucose 102 (H) 65 - 100 mg/dL    BUN 71 (H) 6 - 20 MG/DL    Creatinine 2.38 (H) 0.55 - 1.02 MG/DL    BUN/Creatinine ratio 30 (H) 12 - 20      GFR est AA 24 (L) >60 ml/min/1.73m2    GFR est non-AA 20 (L) >60 ml/min/1.73m2    Calcium 8.9 8.5 - 10.1 MG/DL    Bilirubin, total 0.6 0.2 - 1.0 MG/DL    ALT (SGPT) 85 (H) 12 - 78 U/L    AST (SGOT) 61 (H) 15 - 37 U/L    Alk.  phosphatase 464 (H) 45 - 117 U/L    Protein, total 6.9 6.4 - 8.2 g/dL    Albumin 3.4 (L) 3.5 - 5.0 g/dL    Globulin 3.5 2.0 - 4.0 g/dL    A-G Ratio 1.0 (L) 1.1 - 2.2     TROPONIN I    Collection Time: 03/23/21  3:59 PM   Result Value Ref Range    Troponin-I, Qt. <0.05 <0.05 ng/mL   NT-PRO BNP    Collection Time: 03/23/21  3:59 PM   Result Value Ref Range    NT pro-BNP 1,598 (H) <125 PG/ML   D DIMER    Collection Time: 03/23/21  3:59 PM   Result Value Ref Range    D-dimer 7.89 (H) 0.00 - 0.65 mg/L FEU   URINALYSIS W/ REFLEX CULTURE    Collection Time: 03/23/21  5:07 PM    Specimen: Urine   Result Value Ref Range    Color YELLOW/STRAW      Appearance CLEAR CLEAR      Specific gravity 1.008 1.003 - 1.030      pH (UA) 5.0 5.0 - 8.0      Protein Negative NEG mg/dL    Glucose Negative NEG mg/dL    Ketone Negative NEG mg/dL    Bilirubin Negative NEG      Blood Negative NEG Urobilinogen 0.2 0.2 - 1.0 EU/dL    Nitrites Negative NEG      Leukocyte Esterase SMALL (A) NEG      WBC 0-4 0 - 4 /hpf    RBC 0-5 0 - 5 /hpf    Epithelial cells FEW FEW /lpf    Bacteria Negative NEG /hpf    UA:UC IF INDICATED CULTURE NOT INDICATED BY UA RESULT CNI     TROPONIN I    Collection Time: 03/23/21  6:36 PM   Result Value Ref Range    Troponin-I, Qt. <0.05 <0.05 ng/mL   COVID-19 RAPID TEST    Collection Time: 03/23/21  8:30 PM   Result Value Ref Range    Specimen source Nasopharyngeal      COVID-19 rapid test Not detected NOTD

## 2021-03-24 NOTE — PROGRESS NOTES
End of Shift Note    Bedside shift change report given to Brent Zimmervish Moy Bradley (oncoming nurse) by Tk Mayo RN (offgoing nurse). Report included the following information SBAR, Kardex, Intake/Output, MAR and Recent Results    Shift worked:  7a-7p     Shift summary and any significant changes:    Patient on and off jonathan gtt today with fluctuating blood pressures. Jonathan currently running at 20mcg/min. Abdominal ultrasound and echo completed. Blood cultures and additional labs sent today. Nephro and cardiology following. Concerns for physician to address: BP     Zone phone for oncoming shift:         Activity:  Activity Level: Up with Assistance  Number times ambulated in hallways past shift: 0  Number of times OOB to chair past shift: 0    Cardiac:   Cardiac Monitoring: Yes      Cardiac Rhythm: Normal sinus rhythm    Access:   Current line(s): PIV     Genitourinary:   Urinary status: voiding    Respiratory:   O2 Device: Room air  Chronic home O2 use?: NO  Incentive spirometer at bedside: NO     GI:  Last Bowel Movement Date: 03/23/21  Current diet:  DIET CARDIAC Regular  Passing flatus: YES  Tolerating current diet: YES  % Diet Eaten: 75 %    Pain Management:   Patient states pain is manageable on current regimen: YES    Skin:  Duke Score: 19  Interventions: float heels    Patient Safety:  Fall Score:  Total Score: 4  Interventions: pt to call before getting OOB  High Fall Risk: Yes    Length of Stay:  Expected LOS: - - -  Actual LOS: 0      Tk Mayo RN

## 2021-03-24 NOTE — PROGRESS NOTES
Hospitalist Progress Note    NAME: Clint Eye   :  1945   MRN:  [id number]     Admit date: 3/23/2021    Today's date: 21    PCP: MD Jcarlos Perez M.D. Cell [phone]      Assessment / Plan:    Arterial hypotension 71/36 overnight, required low dose neosynephrine 10 mcg  Baseline essential HTN POA   Bilateral foot pain ? Neuropathy POA  BP dropped overnight, etiology unclear  No MI  No evidence of VTE        NM V/Q scan normal        LE dopplers negative for DVT bilaterally  No clear sepsis        UA with 0-4 WBC, 0 to 5 RBC, no bacteria        CXR No ASD or edema        CT chest with        Increased alk phos                US with mildly nodular liver, no mass                              Normal GB without stones, CBD 3.7 mm                              Normal spleen size                Check fractionated alk phos  Check blood cultures, lactate, procalcitonin   Check echo  Serial HgBs, no clear bleeding  Hold BP meds  Cardiology following  Ambulate     Acute kidney injury POA admit BUN/creat 71 and 2.38  Chronic kidney disease stage III POA baseline creat 1.15, GFR 48  - Stop lisinopril  Gentle IV hydration, creatinine improving   Renal US without hydronephrosis  - Dr Denzel Martin following     Fibromyalgia  Continue Lyrica     Morbid obesity POA Body mass index is 44.13 kg/m². Code Status: Full code  Surrogate Decision Maker: Son information on record     DVT Prophylaxis: Subcu heparin  GI Prophylaxis: not indicated     Baseline: Independent       Subjective:     Chief Complaint / Reason for Physician Visit  \"I feel weak all over\". Discussed with RN events overnight.    Presented with SOB and generalized weakness  Bilateral distal leg pain  Hypotensive overnight, required low dose neosynephrine gtt, just weaned off    Review of Systems:  Symptom Y/N Comments  Symptom Y/N Comments   Fever/Chills n   Chest Pain n    Poor Appetite    Edema     Cough n   Abdominal Pain n    Sputum    Joint Pain     SOB/GATICA n   Headache n    Nausea/vomit n   Tolerating PT/OT     Diarrhea n   Tolerating Diet y    Constipation    Other       Could NOT obtain due to:      Objective:     VITALS:   Last 24hrs VS reviewed since prior progress note.  Most recent are:  Patient Vitals for the past 24 hrs:   Temp Pulse Resp BP SpO2   03/24/21 1549  84  (!) 106/54 99 %   03/24/21 1547  61  (!) 115/54 100 %   03/24/21 1545  (!) 52  (!) 127/56 100 %   03/24/21 1515  61  (!) 131/59 100 %   03/24/21 1500  (!) 51  (!) 116/57 100 %   03/24/21 1445  63  (!) 106/51 100 %   03/24/21 1430 97.9 °F (36.6 °C) (!) 58 18 (!) 106/45 100 %   03/24/21 1330  (!) 53  104/70 100 %   03/24/21 1315  (!) 52  (!) 106/49 100 %   03/24/21 1300  (!) 54  (!) 97/51 100 %   03/24/21 1245  61  (!) 109/50 100 %   03/24/21 1234  64  (!) 102/34    03/24/21 1230  69  (!) 102/34 100 %   03/24/21 1215  65  (!) 102/52 100 %   03/24/21 1200  63  96/62 99 %   03/24/21 1148    (!) 118/51    03/24/21 1130 97.6 °F (36.4 °C) (!) 52 18 (!) 130/51 100 %   03/24/21 1115  (!) 58  (!) 128/46 100 %   03/24/21 1100  (!) 55  115/77 100 %   03/24/21 1045  (!) 58  138/67 100 %   03/24/21 1030  (!) 51  (!) 126/50 100 %   03/24/21 1015  (!) 49  (!) 136/56 100 %   03/24/21 1000  (!) 58  (!) 114/94 99 %   03/24/21 0949  (!) 56  122/65 100 %   03/24/21 0945  (!) 59  (!) 75/64 99 %   03/24/21 0930  64  126/87 100 %   03/24/21 0915  (!) 56  (!) 135/53 100 %   03/24/21 0906  (!) 57  132/62 98 %   03/24/21 0900  60  (!) 98/36 100 %   03/24/21 0830  60  (!) 106/40 100 %   03/24/21 0816  (!) 56  (!) 139/47    03/24/21 0809  (!) 101  110/67 (!) 83 %   03/24/21 0800  (!) 58      03/24/21 0745  (!) 55  100/67 100 %   03/24/21 0730 98 °F (36.7 °C) (!) 53 16 (!) 113/47 100 %   03/24/21 0555 97.9 °F (36.6 °C) (!) 53 15 138/71 100 %   03/24/21 0515  (!) 49 13 (!) 134/52 98 %   03/24/21 0500  (!) 46 14 (!) 122/48 98 %   03/24/21 0445  (!) 48 14 (!) 126/51 98 %   03/24/21 0430  (!) 51 13 (!) 121/48 99 %   03/24/21 0415  (!) 59 16 (!) 112/51 100 %   03/24/21 0400  64 15 (!) 96/45 98 %   03/24/21 0356  66  (!) 91/42    03/24/21 0345  66 14 (!) 91/42 98 %   03/24/21 0315  74 13 (!) 105/43 99 %   03/24/21 0300  63 15 (!) 89/39 97 %   03/24/21 0245  65 14 (!) 95/35 98 %   03/24/21 0230  65 15 (!) 93/42 99 %   03/24/21 0215  66 14 (!) 95/40 99 %   03/24/21 0207     100 %   03/24/21 0200 98.1 °F (36.7 °C) 64 18 (!) 94/46 100 %   03/24/21 0145  64 15 115/60 100 %   03/24/21 0130  73 17 (!) 116/43 99 %   03/24/21 0115  65 17 (!) 71/36 100 %   03/24/21 0100  67 15 (!) 82/40 97 %   03/24/21 0045  68 15 (!) 81/43 99 %   03/24/21 0044  72 12 (!) 83/55 98 %   03/24/21 0015  76 17 (!) 79/29 97 %   03/23/21 2315  (!) 118 20 (!) 122/96 98 %   03/23/21 2300  (!) 120 18 110/75 100 %   03/23/21 2245  (!) 114 21 90/65 99 %   03/23/21 2000  (!) 110 21 (!) 132/92 100 %   03/23/21 1955  (!) 107 18 (!) 85/67 99 %   03/23/21 1830  (!) 101 26 91/65 97 %   03/23/21 1645  73 18 (!) 102/57 100 %   03/23/21 1630  98 26 (!) 98/59 100 %       Intake/Output Summary (Last 24 hours) at 3/24/2021 1615  Last data filed at 3/24/2021 1430  Gross per 24 hour   Intake 540 ml   Output    Net 540 ml        Wt Readings from Last 12 Encounters:   03/23/21 102.5 kg (225 lb 15.5 oz)   09/22/20 105.7 kg (233 lb)   03/10/20 103 kg (227 lb)   01/29/20 106.5 kg (234 lb 12.8 oz)   12/16/19 107.2 kg (236 lb 6.4 oz)   11/12/19 106.1 kg (234 lb)   08/19/19 107.5 kg (237 lb 1.6 oz)   08/13/19 107.5 kg (237 lb)   05/06/19 106.1 kg (234 lb)   04/11/19 108.4 kg (239 lb)   04/08/19 107.9 kg (237 lb 12.8 oz)   10/18/18 106.1 kg (234 lb)       PHYSICAL EXAM:    I had a face to face encounter and independently examined this patient on 03/24/21 as outlined below:    General: WD, WN. Alert, cooperative, no acute distress    EENT:  PERRL. Anicteric sclerae. MMM  Neck:  No meningismus, no thyromegaly  Resp:  CTA bilaterally, no wheezing or rales. No accessory muscle use  CV:  Regular  rhythm,  No edema  GI:  Soft, Non distended, Non tender. +Bowel sounds, no rebound  LN:  No cervical or inguinal MARISSA  Neurologic:  Alert and oriented X 3, normal speech, non focal motor exam, no pronator drift  Psych:   Good insight. Not anxious nor agitated  Skin:  No rashes. No jaundice    Reviewed most current lab test results and cultures  YES  Reviewed most current radiology test results   YES  Review and summation of old records today    NO  Reviewed patient's current orders and MAR    YES  PMH/ reviewed - no change compared to H&P  ________________________________________________________________________  Care Plan discussed with:    Comments   Patient x    Family      RN x    Care Manager     Consultant                        Multidiciplinary team rounds were held today with , nursing, pharmacist and clinical coordinator. Patient's plan of care was discussed; medications were reviewed and discharge planning was addressed. ________________________________________________________________________      Comments   >50% of visit spent in counseling and coordination of care     ________________________________________________________________________  Shikha Ghotra MD     Procedures: see electronic medical records for all procedures/Xrays and details which were not copied into this note but were reviewed prior to creation of Plan. LABS:  I reviewed today's most current labs and imaging studies.   Pertinent labs include:  Recent Labs     03/24/21  0411 03/23/21  1559   WBC 5.3 4.6   HGB 9.2* 11.2*   HCT 28.7* 34.8*    199     Recent Labs     03/24/21  0411 03/23/21  1559   * 133*   K 4.6 5.2*    105   CO2 17* 19*   GLU 85 102*   BUN 69* 71*   CREA 2.27* 2.38*   CA 8.9 8.9   MG 2.2  --    ALB 3.4* 3.4*   TBILI 0.5 0.6 ALT 62 85*

## 2021-03-24 NOTE — PROGRESS NOTES
Problem: Falls - Risk of  Goal: *Absence of Falls  Description: Document Hali Cuevas Fall Risk and appropriate interventions in the flowsheet.   Outcome: Progressing Towards Goal  Note: Fall Risk Interventions:  Mobility Interventions: Bed/chair exit alarm, Patient to call before getting OOB         Medication Interventions: Bed/chair exit alarm, Patient to call before getting OOB, Teach patient to arise slowly    Elimination Interventions: Call light in reach, Patient to call for help with toileting needs, Toileting schedule/hourly rounds    History of Falls Interventions: Bed/chair exit alarm, Door open when patient unattended, Consult care management for discharge planning

## 2021-03-24 NOTE — PROGRESS NOTES
0600- TRANSFER - IN REPORT:    Verbal report received from Burna, [facility] (name) on [facility]  being received from ED(unit) for routine progression of care      Report consisted of patients Situation, Background, Assessment and   Recommendations(SBAR). Information from the following report(s) SBAR, Kardex, ED Summary, Intake/Output, MAR, Recent Results, Med Rec Status and Cardiac Rhythm Sinus ernestina was reviewed with the receiving nurse. Opportunity for questions and clarification was provided. Assessment completed upon patients arrival to unit and care assumed. 0700- End of Shift Note    Bedside shift change report given to Cordelia Roberts RN (oncoming nurse) by CHUCK Westbrook (offgoing nurse). Report included the following information SBAR, Kardex, ED Summary, Intake/Output, MAR, Recent Results, Med Rec Status and Cardiac Rhythm Sinus ernestina    Shift worked:  7p-7a     Shift summary and any significant changes:     Pt arrived to unit 0600. Pt on amalia gtt, titrated down to 10 mcg/min, BP systolic >097. No complaints     Concerns for physician to address:       Zone phone for oncoming shift:          Activity:  Activity Level: Up ad casper  Number times ambulated in hallways past shift: 0  Number of times OOB to chair past shift: 0    Cardiac:   Cardiac Monitoring: Yes      Cardiac Rhythm: Sinus bradycardia    Access:   Current line(s): PIV     Genitourinary:   Urinary status: voiding    Respiratory:   O2 Device: Room air  Chronic home O2 use?: NO  Incentive spirometer at bedside: NO     GI:  Last Bowel Movement Date: 03/23/21  Current diet:  DIET CARDIAC Regular  Passing flatus: YES  Tolerating current diet: YES       Pain Management:   Patient states pain is manageable on current regimen: YES    Skin:  Duke Score: 23  Interventions: speciality bed, increase time out of bed and PT/OT consult    Patient Safety:  Fall Score:  Total Score: 1  Interventions: bed/chair alarm and pt to call before getting OOB       Length of Stay:  Expected LOS: - - -  Actual LOS: [address]

## 2021-03-24 NOTE — CONSULTS
IP Cardiology Consult       Date of consult:  03/24/21  Date of admission: 3/23/2021  Physician Requesting consult: Dr Zahraa Sanchez:    1. Dizziness, weakness, possible dehydration AND hypovolemic (not eating well, takes lasix and lisinopril)    2. Dyspnea   3. MARIELENA over CKD   4. Hypotension, requiring Phenylphrine   5. HTN  6. Anemia  7. 1st degree AV block and IVCD, no significant arrhythmia     8. CAD by calcification of coronary artery on CT          Recommendations:    1. Feeling better after IV fluid, BP also appear to be improving, on IV hydration   2. Monitor BP off Phenylephrine, check orthostatic   3. Hold Lisinopril   4. Hold lasix   5. Monitor on tele   6. Check 2d echo   7. No indication of infection     Thank you for allowing me participate in patient care, will cont to follow . [x]        High complexity decision making was performed      CC / Reason for consult: Feeling weak and dizzy     History of the presenting illness:  Ophelia Swanson is a 70 y.o. female with h/o HTN, Fibromyalgia, bradycardia, CKD, anemia presented for feeling weak for past few days. She was also feeling dizzy mainly with standing and walking , sometimes changing position in bed. Had mild dyspnea. No CP. NO syncope. She had lost job at [employer] last year. He worked to get down and lost 30-40 lbs. She is eating less nothing until dinner. She does report drinking enough water. She has pain from fibromyalgia and was taking some over the counter pain meds and was wondering if she is not feeling well because of that. She was given  IV fluid. She was also hypotensive. She feels better now. She did require Phenylphrine for low BP. Today her BP is better. She had HR of 50s at night / at rest which is sinus rhythm with 1st degree AV block,  Ambulation leads to increase in HR appropriately.  HAD sinus tachycardia when she arrived.        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 Authorizing Provider   furosemide (LASIX) 20 mg tablet TAKE 1 TABLET BY MOUTH ONCE DAILY AS NEEDED FOR  SWELLING 3/14/21   Sonam Deleon MD   lisinopriL (PRINIVIL, ZESTRIL) 10 mg tablet Take 1 Tab by mouth daily. 1/18/21   Sonam Deleon MD   traMADoL (ULTRAM) 50 mg tablet TAKE 1 TABLET BY MOUTH EVERY 6 HOURS AS NEEDED FOR PAIN UP TO  30 DAYS . DO NOT EXCEED 4 PER 24 HOURS 10/19/20 11/18/20  Sonam Deleon MD   naproxen (NAPROSYN) 500 mg tablet Take 500 mg by mouth two (2) times daily (with meals). Provider, Historical   pregabalin (Lyrica) 25 mg capsule Take 25 mg by mouth as needed. Provider, Historical   cetirizine (ZyrTEC) 10 mg tablet Take 5 mg by mouth two (2) times a day. Provider, Historical   fluticasone propionate (Flonase Allergy Relief) 50 mcg/actuation nasal spray 2 Sprays by Both Nostrils route daily. Provider, Historical   LORazepam (ATIVAN) 1 mg tablet Take one pill 1.5 hours prior to injection 1/28/20   Provider, Historical   acetaminophen (TYLENOL) 325 mg tablet Take  by mouth every four (4) hours as needed for Pain.     Provider, Historical       Family History   Problem Relation Age of Onset    Diabetes Mother     Heart Disease Mother     Hypertension Mother     Hypertension Father     Heart Disease Father         Social History     Socioeconomic History    Marital status: SINGLE     Spouse name: Not on file    Number of children: Not on file    Years of education: Not on file    Highest education level: Not on file   Occupational History    Not on file   Social Needs    Financial resource strain: Not on file    Food insecurity     Worry: Not on file     Inability: Not on file   Eyefreight needs Medical: Not on file     Non-medical: Not on file   Tobacco Use    Smoking status: Never Smoker    Smokeless tobacco: Never Used   Substance and Sexual Activity    Alcohol use: No    Drug use: Never    Sexual activity: Never   Lifestyle    Physical activity     Days per week: Not on file     Minutes per session: Not on file    Stress: Not on file   Relationships    Social connections     Talks on phone: Not on file     Gets together: Not on file     Attends Cheondoism service: Not on file     Active member of club or organization: Not on file     Attends meetings of clubs or organizations: Not on file     Relationship status: Not on file    Intimate partner violence     Fear of current or ex partner: Not on file     Emotionally abused: Not on file     Physically abused: Not on file     Forced sexual activity: Not on file   Other Topics Concern    Not on file   Social History Narrative    Not on file         ROS      Total of 12 systems reviewed, all systems review was negative except Pertinent Positives included in HPI     Visit Vitals  BP (!) 131/59   Pulse 61   Temp 97.9 °F (36.6 °C)   Resp 18   Ht 5' (1.524 m)   Wt 102.5 kg (225 lb 15.5 oz)   SpO2 100%   BMI 44.13 kg/m²       Physical Exam  Examination:     General: Alert + Oriented x3, no acute distress   HEENT: Normocephalic aromatic, MMM   Neck: Supple, JVP- not well appreciated   RS: Non labored, clear   CVS: Regular rate and rhythm, S1S2, SM  Abd: Soft, non tender, non distended   Lower extremity: Warm to touch, Edema- trace  Skin: Warm and dry, No significant bruises or rash   CNS: Oriented x3, no focal neuro deficit     Lab review:  BMP:   Lab Results   Component Value Date/Time     (L) 03/24/2021 04:11 AM    K 4.6 03/24/2021 04:11 AM     03/24/2021 04:11 AM    CO2 17 (L) 03/24/2021 04:11 AM    AGAP 11 03/24/2021 04:11 AM    GLU 85 03/24/2021 04:11 AM    BUN 69 (H) 03/24/2021 04:11 AM    CREA 2.27 (H) 03/24/2021 04:11 AM    GFRAA 26 (L) 03/24/2021 04:11 AM    GFRNA 21 (L) 03/24/2021 04:11 AM        CBC:  Lab Results   Component Value Date/Time    WBC 5.3 03/24/2021 04:11 AM    HGB 9.2 (L) 03/24/2021 04:11 AM    HCT 28.7 (L) 03/24/2021 04:11 AM    PLATELET 023 66/09/9487 04:11 AM    MCV 94.4 03/24/2021 04:11 AM       All Cardiac Markers in the last 24 hours:    Lab Results   Component Value Date/Time    TROIQ <0.05 03/23/2021 06:36 PM    TROIQ <0.05 03/23/2021 03:59 PM    BNPNT 1,598 (H) 03/23/2021 03:59 PM       Data review:    Tele: NSR, Hung with HR 50s, occasional PAC     EKG tracing personally reviewed: NSR, 1st degree AV block, IVCD     Echocardiogram: NA    Other cardiac testing:    Other imaging:  CTIMPRESSION  No acute cardiopulmonary process. Coronary atherosclerotic disease. US   IMPRESSION  1. Heterogeneous nodular liver concerning for cirrhosis.   2. Small kidneys.       Signed:  Annamarie Byrd MD  Interventional Cardiology  3/24/2021

## 2021-03-24 NOTE — PROGRESS NOTES
Pharmacy - Heparin Dose Adjustment  Indication: VTE prophylaxis  Current Dose: Heparin 5000 units subcutaneously every 8 hours    Weight Ht Readings from Last 1 Encounters:   03/23/21 152.4 cm (60\")      Height Wt Readings from Last 1 Encounters:   03/23/21 102.5 kg (225 lb 15.5 oz)      BMI Body mass index is 44.13 kg/m².      Impression/Plan:   Change to  Heparin 7500 units q8hr per protocol for BMI >40     Thanks,  Tor Avalos, CAMDEND

## 2021-03-24 NOTE — CONSULTS
Consultation Note    NAME: Nanette aCr   :  1945   MRN:  [id number]     Date/Time:  3/24/2021 11:45 AM    I have been asked to see this patient by Dr. Migdalia Jaime  for advice/opinion re: CKD-3a/MARIELENA. Assessment :    Plan:  CKD-3- Dr. Deshawn Reyes - baseline creatinine 1.1. MARIELENA  Dyspnea  Anemia  HTN  Fibromyalgia  hyperkalemia Her creatinine is well above baseline. Her UA shows no blood and no protein. Us pending. I would continue with IVF - although I will switch it to NS as her potassium has been elevated. Her creatinine is a little better overnight with IVF. Hopefully that will continue. Will need to watch volume status. Agree with holding lisinopril. I will check iron status. Subjective:   CHIEF COMPLAINT:  \"I've bene weak for a month. \"    HISTORY OF PRESENT ILLNESS:     Driss May is a 70 y.o.   female who has a history of CKD-3a followed by Dr. Deshawn Reyes last see in  with a creatinine of 1.1 admitted with MARIELENA. She says that she has been feeling weak for the past month or more. She says that she lost her job at [employer] last March when the pandemic hit. She says that she has worked to get her weight down and has lost 30 pounds. She continues to work to keep it down. She says that she has frosted flakes in the AM and then eats nothing until dinner when she has eggs and toast.  She drinks 3 bottles of water a day. She has pain from her fibro and occasionally uses NSAID's. She says that she has had dyspnea - worse with exertion over the past several weeks. She also has had foot pain she attributes to her fibro. Admitting creatinine was 2.38 and down a little today to 2.27.       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, removed, pt not sure what type    Fibromyalgia     Hypertension       Past Surgical History:   Procedure Laterality Date    HX COLONOSCOPY      HX DILATION AND CURETTAGE      HX TONSILLECTOMY      as a child     Social History     Tobacco Use    Smoking status: Never Smoker    Smokeless tobacco: Never Used   Substance Use Topics    Alcohol use: No      Family History   Problem Relation Age of Onset    Diabetes Mother     Heart Disease Mother     Hypertension Mother     Hypertension Father     Heart Disease Father       No Known Allergies   Prior to Admission medications    Medication Sig Start Date End Date Taking? Authorizing Provider   furosemide (LASIX) 20 mg tablet TAKE 1 TABLET BY MOUTH ONCE DAILY AS NEEDED FOR  SWELLING 3/14/21   Valarie Garcia MD   lisinopriL (PRINIVIL, ZESTRIL) 10 mg tablet Take 1 Tab by mouth daily. 1/18/21   Valarie Garcia MD   traMADoL (ULTRAM) 50 mg tablet TAKE 1 TABLET BY MOUTH EVERY 6 HOURS AS NEEDED FOR PAIN UP TO  30 DAYS . DO NOT EXCEED 4 PER 24 HOURS 10/19/20 11/18/20  Valarie Garcia MD   naproxen (NAPROSYN) 500 mg tablet Take 500 mg by mouth two (2) times daily (with meals). Provider, Historical   pregabalin (Lyrica) 25 mg capsule Take 25 mg by mouth as needed. Provider, Historical   cetirizine (ZyrTEC) 10 mg tablet Take 5 mg by mouth two (2) times a day. Provider, Historical   fluticasone propionate (Flonase Allergy Relief) 50 mcg/actuation nasal spray 2 Sprays by Both Nostrils route daily. Provider, Historical   LORazepam (ATIVAN) 1 mg tablet Take one pill 1.5 hours prior to injection 1/28/20   Provider, Historical   acetaminophen (TYLENOL) 325 mg tablet Take  by mouth every four (4) hours as needed for Pain.     Provider, Historical     REVIEW OF SYSTEMS:     []  Unable to obtain reliable ROS due to  [] mental status  [] sedated   [] intubated   [x] Total of 12 systems reviewed as follows:  Constitutional: negative fever, negative chills, negative weight loss; + weak  Eyes:   negative visual changes  ENT:   negative sore throat, tongue or lip swelling  Respiratory: negative cough, negative dyspnea  Cards:  negative for chest pain, palpitations, lower extremity edema  GI:   negative for nausea, vomiting, diarrhea, and abdominal pain  :  negative for frequency, dysuria  Integument:  negative for rash and pruritus  Heme:  negative for easy bruising and gum/nose bleeding  Musculoskel: negative for myalgias,  back pain and muscle weakness  Neuro:  negative for headaches, dizziness, vertigo  Psych:  negative for feelings of anxiety, depression   Travel?: none    Objective:   VITALS:    Visit Vitals  BP (!) 130/51 (BP 1 Location: Left upper arm, BP Patient Position: At rest)   Pulse (!) 52   Temp 98 °F (36.7 °C)   Resp 18   Ht 5' (1.524 m)   Wt 102.5 kg (225 lb 15.5 oz)   SpO2 100%   BMI 44.13 kg/m²     PHYSICAL EXAM:  Gen:  []  WD []  WN  [] cachectic []  thin []  obese []  disheveled             []  ill apearing  []   Critical  [x]   Chronic    [x]  No acute distress    HEENT:   [x] NC/AT/PERRL    [x] pink conjunctivae      [] pale conjunctivae                  PERRL  [] yes  [] no      [] moist mucosa    [] dry mucosa    hearing intact to voice [] yes  [] No                 NECK:   supple [x] yes  [] no        masses [] yes  [x] No               thyroid  []  non tender  []  tender    RESP:   [x] CTA bilaterally/no wheezing/rhonchi/rales/crackles    [] rhonchi bilaterally - no dullness  [] wheezing   [] rhonchi   [] crackles     use of accessory muscles [] yes [] no    CARD:   [x]  regular rate and rhythm/No murmurs/rubs/gallops    murmur  [] yes ()  [] no      Rubs  [] yes  [] no       Gallops [] yes  [] no    Rate []  regular  []  irregular        carotid bruits  [] Right  []  Left                 LE edema [] yes  [x] no           JVP  []  yes   []  no    ABD:    [x] soft/non distended/non tender/+bowel sounds/no HSM    []  Rigid    tenderness [] yes [] no   Liver enlargement  []  yes []  no                Spleen enlargement  []  yes []  no     distended []  yes [] no bowel sound  [] hypoactive   [] hyperactive    LYMPH:    Neck []  yes [x]  no       Axillae []  yes [x]  no    SKIN:   Rashes []  yes   [x]  no    Ulcers []  yes   [x]  no               [] tight to palpitation    skin turgor []  good  [] poor  [] decreased               Cyanosis/clubbing []  yes []  no    NEUR:   [x] cranial nerves II-XII grossly intact       [] Cranial nerves deficit                 []  facial droop    []  slurred speech   [] aphasic     [] Strength normal     []  weakness  []  LUE  []   RUE/ []  LLE  []   RLE    follows commands  [x]  yes []  no           PSYCH:   insight [] poor [x] good   Alert and Oriented to  [x] person  [x] place  [x]  time                    [] depressed [] anxious [] agitated  [] lethargic [] stuporous  [] sedated     LAB DATA REVIEWED:    Recent Labs     03/24/21 0411 03/23/21  1559   WBC 5.3 4.6   HGB 9.2* 11.2*   HCT 28.7* 34.8*    199     Recent Labs     03/24/21  0411 03/23/21  1559   * 133*   K 4.6 5.2*    105   CO2 17* 19*   BUN 69* 71*   CREA 2.27* 2.38*   GLU 85 102*   CA 8.9 8.9   MG 2.2  --      Recent Labs     03/24/21 0411 03/23/21  1559   ALT 62 85*   * 464*   TBILI 0.5 0.6   ALB 3.4* 3.4*   GLOB 2.8 3.5     No results for input(s): INR, PTP, APTT, INREXT in the last 72 hours. No results for input(s): FE, TIBC, PSAT, FERR in the last 72 hours. No results for input(s): PH, PCO2, PO2 in the last 72 hours. No results for input(s): CPK, CKMB in the last 72 hours.     No lab exists for component: TROPONINI  No results found for: GLUCPOC    Procedures: see electronic medical records for all procedures/Xrays and details which were not copied into this note but were reviewed prior to creation of Plan.    ________________________________________________________________________       ___________________________________________________  Consulting Physician: Violet Nuñez MD

## 2021-03-24 NOTE — PROGRESS NOTES
Transition of Care Plan:    RUR: 10%    Disposition: Home with family follow up    Follow up appointments: To be made prior to discharge. DME needed: None    Transportation at Discharge: Family to transport. Keys or means to access home:  Son has her keys. IM Medicare letter: To be given prior to discharge . Caregiver Contact:  Son    Discharge Caregiver contacted prior to discharge? Caregiver to be contacted prior to discharge. Reason for Admission:  Patient came to ed for complaint of fatigue and sob. PMHX significant for htn, fibromyalgia, obesity, ckd and low back pain. RUR Score:  10%                   Plan for utilizing home health:   She has not used home health services in the past and does not think she will need it this admission. PCP: First and Last name: Patric Barrientos MD     Name of Practice: [facility]. Are you a current patient: Yes/No:  Yes     Approximate date of last visit: February 2021.      Can you participate in a virtual visit with your PCP:  Yes                      Current Advanced Directive/Advance Care Plan: Full Code  [address] (ACP) Conversation      Date of Conversation: 3/23/2021  Conducted with: Patient with [address]:     Click here to complete [email or web address] including selection of the [email or web address] Relationship (ie \"Primary\")      Content/Action Overview:   DECLINED ACP conversation - will revisit periodically   Reviewed DNR/DNI and patient elects Full Code (Attempt Resuscitation)        Length of Voluntary ACP Conversation in minutes:  16 minutes    Lynette Harp RN               Healthcare Decision Maker:   Click here to complete [email or web address] including selection of the Healthcare Decision Maker Relationship (ie \"Primary\")                     Patient lives in one bedroom apartment on the first floor. She lives alone and is independent. She drives. She denies using home oxygen or cpap. She does not use dme for mobiity. She states her son and his wife have bought a house and she will be moving in with them. She states the move is in April of this year. Amada BECERRAN [employer]        [phone]

## 2021-03-24 NOTE — ED NOTES
After pt was ambulatory to restroom pt O2 saturation dropped to 89% on room air.  Pt recovered within 2 min o@ went back up to 95% on room air

## 2021-03-24 NOTE — ED NOTES
Bedside shift change report given to Russell Del Valle and Chetna RN  (oncoming nurse) by Doug Smallwood and Lexi Coyne RN (offgoing nurse). Report included the following information SBAR, ED Summary, MAR and Recent Results.

## 2021-03-24 NOTE — PROGRESS NOTES
Received message from patient's nurse Rodriguez Fournier stating :    pt map trending down, multiple BP's with maps in 40's       Discussion / orders:    Patient Vitals for the past 4 hrs:   Pulse Resp BP SpO2   03/24/21 0044 72 12 (!) 83/55 98 %   03/23/21 2315 (!) 118 20 (!) 122/96 98 %   03/23/21 2300 (!) 120 18 110/75 100 %   03/23/21 2245 (!) 114 21 90/65 99 %       Pro-BNP  1598    · Lactated Ringer 250 mL bolus x1  · Albumin bolus x1           Please note that this note was dictated using Dragon computer voice recognition software. Quite often unanticipated grammatical, syntax, homophones, and other interpretive errors are inadvertently transcribed by the computer software. Please disregard these errors. Please excuse any errors that have escaped final proofreading.

## 2021-03-25 LAB
ALBUMIN SERPL-MCNC: 3.2 G/DL (ref 3.5–5)
ALBUMIN/GLOB SERPL: 1.1 {RATIO} (ref 1.1–2.2)
ALP SERPL-CCNC: 365 U/L (ref 45–117)
ALT SERPL-CCNC: 57 U/L (ref 12–78)
ANION GAP SERPL CALC-SCNC: 8 MMOL/L (ref 5–15)
AST SERPL-CCNC: 28 U/L (ref 15–37)
BASOPHILS # BLD: 0.1 K/UL (ref 0–0.1)
BASOPHILS NFR BLD: 1 % (ref 0–1)
BILIRUB SERPL-MCNC: 0.5 MG/DL (ref 0.2–1)
BUN SERPL-MCNC: 59 MG/DL (ref 6–20)
BUN/CREAT SERPL: 39 (ref 12–20)
CALCIUM SERPL-MCNC: 8.7 MG/DL (ref 8.5–10.1)
CHLORIDE SERPL-SCNC: 111 MMOL/L (ref 97–108)
CO2 SERPL-SCNC: 17 MMOL/L (ref 21–32)
CREAT SERPL-MCNC: 1.52 MG/DL (ref 0.55–1.02)
DIFFERENTIAL METHOD BLD: ABNORMAL
ECHO AO ROOT DIAM: 3.15 CM
ECHO AV AREA PEAK VELOCITY: 1.82 CM2
ECHO AV AREA/BSA PEAK VELOCITY: 0.9 CM2/M2
ECHO AV PEAK GRADIENT: 6.61 MMHG
ECHO AV PEAK VELOCITY: 128.51 CM/S
ECHO EST RA PRESSURE: 10 MMHG
ECHO LA MAJOR AXIS: 3.14 CM
ECHO LA MINOR AXIS: 1.6 CM
ECHO LV E' LATERAL VELOCITY: 5.23 CM/S
ECHO LV E' SEPTAL VELOCITY: 3.83 CM/S
ECHO LV INTERNAL DIMENSION DIASTOLIC: 4.66 CM (ref 3.9–5.3)
ECHO LV INTERNAL DIMENSION SYSTOLIC: 3.54 CM
ECHO LV IVSD: 1.34 CM (ref 0.6–0.9)
ECHO LV IVSS: 1.8 CM
ECHO LV MASS 2D: 214.2 G (ref 67–162)
ECHO LV MASS INDEX 2D: 108.9 G/M2 (ref 43–95)
ECHO LV POSTERIOR WALL DIASTOLIC: 1.1 CM (ref 0.6–0.9)
ECHO LV POSTERIOR WALL SYSTOLIC: 1.83 CM
ECHO LVOT DIAM: 2.06 CM
ECHO LVOT PEAK GRADIENT: 1.98 MMHG
ECHO LVOT PEAK VELOCITY: 70.27 CM/S
ECHO MV A VELOCITY: 95.96 CM/S
ECHO MV E DECELERATION TIME (DT): 276.92 MS
ECHO MV E VELOCITY: 53.66 CM/S
ECHO MV E/A RATIO: 0.56
ECHO MV E/E' LATERAL: 10.26
ECHO MV E/E' RATIO (AVERAGED): 12.14
ECHO MV E/E' SEPTAL: 14.01
ECHO RIGHT VENTRICULAR SYSTOLIC PRESSURE (RVSP): 23.99 MMHG
ECHO TV REGURGITANT MAX VELOCITY: 186.99 CM/S
ECHO TV REGURGITANT PEAK GRADIENT: 13.99 MMHG
EOSINOPHIL # BLD: 0.4 K/UL (ref 0–0.4)
EOSINOPHIL NFR BLD: 8 % (ref 0–7)
ERYTHROCYTE [DISTWIDTH] IN BLOOD BY AUTOMATED COUNT: 13.7 % (ref 11.5–14.5)
FERRITIN SERPL-MCNC: 400 NG/ML (ref 26–388)
GLOBULIN SER CALC-MCNC: 3 G/DL (ref 2–4)
GLUCOSE SERPL-MCNC: 85 MG/DL (ref 65–100)
HCT VFR BLD AUTO: 30.8 % (ref 35–47)
HGB BLD-MCNC: 9.7 G/DL (ref 11.5–16)
IMM GRANULOCYTES # BLD AUTO: 0 K/UL (ref 0–0.04)
IMM GRANULOCYTES NFR BLD AUTO: 1 % (ref 0–0.5)
IRON SATN MFR SERPL: 27 % (ref 20–50)
IRON SERPL-MCNC: 64 UG/DL (ref 35–150)
LYMPHOCYTES # BLD: 1.3 K/UL (ref 0.8–3.5)
LYMPHOCYTES NFR BLD: 27 % (ref 12–49)
MAGNESIUM SERPL-MCNC: 2.2 MG/DL (ref 1.6–2.4)
MCH RBC QN AUTO: 29.9 PG (ref 26–34)
MCHC RBC AUTO-ENTMCNC: 31.5 G/DL (ref 30–36.5)
MCV RBC AUTO: 95.1 FL (ref 80–99)
MONOCYTES # BLD: 0.5 K/UL (ref 0–1)
MONOCYTES NFR BLD: 11 % (ref 5–13)
NEUTS SEG # BLD: 2.6 K/UL (ref 1.8–8)
NEUTS SEG NFR BLD: 52 % (ref 32–75)
NRBC # BLD: 0 K/UL (ref 0–0.01)
NRBC BLD-RTO: 0 PER 100 WBC
PLATELET # BLD AUTO: 214 K/UL (ref 150–400)
PMV BLD AUTO: 9.8 FL (ref 8.9–12.9)
POTASSIUM SERPL-SCNC: 5 MMOL/L (ref 3.5–5.1)
PROT SERPL-MCNC: 6.2 G/DL (ref 6.4–8.2)
RBC # BLD AUTO: 3.24 M/UL (ref 3.8–5.2)
SODIUM SERPL-SCNC: 136 MMOL/L (ref 136–145)
TIBC SERPL-MCNC: 234 UG/DL (ref 250–450)
TROPONIN I SERPL-MCNC: <0.05 NG/ML
WBC # BLD AUTO: 5 K/UL (ref 3.6–11)

## 2021-03-25 PROCEDURE — 84484 ASSAY OF TROPONIN QUANT: CPT

## 2021-03-25 PROCEDURE — 74011250637 HC RX REV CODE- 250/637: Performed by: STUDENT IN AN ORGANIZED HEALTH CARE EDUCATION/TRAINING PROGRAM

## 2021-03-25 PROCEDURE — 74011250636 HC RX REV CODE- 250/636: Performed by: INTERNAL MEDICINE

## 2021-03-25 PROCEDURE — 74011250637 HC RX REV CODE- 250/637: Performed by: INTERNAL MEDICINE

## 2021-03-25 PROCEDURE — 82533 TOTAL CORTISOL: CPT

## 2021-03-25 PROCEDURE — 97116 GAIT TRAINING THERAPY: CPT

## 2021-03-25 PROCEDURE — 74011000250 HC RX REV CODE- 250: Performed by: INTERNAL MEDICINE

## 2021-03-25 PROCEDURE — 36415 COLL VENOUS BLD VENIPUNCTURE: CPT

## 2021-03-25 PROCEDURE — 83540 ASSAY OF IRON: CPT

## 2021-03-25 PROCEDURE — 65660000000 HC RM CCU STEPDOWN

## 2021-03-25 PROCEDURE — 82728 ASSAY OF FERRITIN: CPT

## 2021-03-25 PROCEDURE — 74011250636 HC RX REV CODE- 250/636: Performed by: STUDENT IN AN ORGANIZED HEALTH CARE EDUCATION/TRAINING PROGRAM

## 2021-03-25 PROCEDURE — 74011000250 HC RX REV CODE- 250: Performed by: NURSE PRACTITIONER

## 2021-03-25 PROCEDURE — 74011250636 HC RX REV CODE- 250/636: Performed by: NURSE PRACTITIONER

## 2021-03-25 PROCEDURE — 80053 COMPREHEN METABOLIC PANEL: CPT

## 2021-03-25 PROCEDURE — 97163 PT EVAL HIGH COMPLEX 45 MIN: CPT

## 2021-03-25 PROCEDURE — 97530 THERAPEUTIC ACTIVITIES: CPT

## 2021-03-25 PROCEDURE — 85025 COMPLETE CBC W/AUTO DIFF WBC: CPT

## 2021-03-25 PROCEDURE — 84080 ASSAY ALKALINE PHOSPHATASES: CPT

## 2021-03-25 PROCEDURE — 83735 ASSAY OF MAGNESIUM: CPT

## 2021-03-25 RX ORDER — MAG HYDROX/ALUMINUM HYD/SIMETH 200-200-20
30 SUSPENSION, ORAL (FINAL DOSE FORM) ORAL
Status: DISCONTINUED | OUTPATIENT
Start: 2021-03-25 | End: 2021-03-26 | Stop reason: HOSPADM

## 2021-03-25 RX ORDER — MIDODRINE HYDROCHLORIDE 5 MG/1
5 TABLET ORAL
Status: DISCONTINUED | OUTPATIENT
Start: 2021-03-25 | End: 2021-03-26 | Stop reason: HOSPADM

## 2021-03-25 RX ORDER — DEXAMETHASONE SODIUM PHOSPHATE 10 MG/ML
4 INJECTION INTRAMUSCULAR; INTRAVENOUS EVERY 6 HOURS
Status: DISCONTINUED | OUTPATIENT
Start: 2021-03-25 | End: 2021-03-25

## 2021-03-25 RX ADMIN — Medication 200 MG: at 09:17

## 2021-03-25 RX ADMIN — MIDODRINE HYDROCHLORIDE 5 MG: 5 TABLET ORAL at 13:55

## 2021-03-25 RX ADMIN — PHENYLEPHRINE HYDROCHLORIDE 15 MCG/MIN: 10 INJECTION INTRAVENOUS at 10:26

## 2021-03-25 RX ADMIN — Medication 10 ML: at 13:57

## 2021-03-25 RX ADMIN — PHENYLEPHRINE HYDROCHLORIDE 20 MCG/MIN: 10 INJECTION INTRAVENOUS at 10:53

## 2021-03-25 RX ADMIN — COSYNTROPIN 0.25 MG: 0.25 INJECTION, POWDER, LYOPHILIZED, FOR SOLUTION INTRAMUSCULAR; INTRAVENOUS at 20:40

## 2021-03-25 RX ADMIN — COSYNTROPIN 0.25 MG: 0.25 INJECTION, POWDER, LYOPHILIZED, FOR SOLUTION INTRAMUSCULAR; INTRAVENOUS at 18:48

## 2021-03-25 RX ADMIN — Medication 10 ML: at 20:41

## 2021-03-25 RX ADMIN — PHENYLEPHRINE HYDROCHLORIDE 10 MCG/MIN: 10 INJECTION INTRAVENOUS at 15:39

## 2021-03-25 RX ADMIN — SODIUM CHLORIDE 75 ML/HR: 9 INJECTION, SOLUTION INTRAVENOUS at 03:16

## 2021-03-25 RX ADMIN — MIDODRINE HYDROCHLORIDE 5 MG: 5 TABLET ORAL at 17:36

## 2021-03-25 RX ADMIN — SODIUM CHLORIDE 75 ML/HR: 9 INJECTION, SOLUTION INTRAVENOUS at 20:33

## 2021-03-25 RX ADMIN — HEPARIN SODIUM 7500 UNITS: 5000 INJECTION INTRAVENOUS; SUBCUTANEOUS at 13:55

## 2021-03-25 RX ADMIN — SODIUM CHLORIDE 500 ML: 9 INJECTION, SOLUTION INTRAVENOUS at 18:50

## 2021-03-25 RX ADMIN — HEPARIN SODIUM 7500 UNITS: 5000 INJECTION INTRAVENOUS; SUBCUTANEOUS at 21:25

## 2021-03-25 RX ADMIN — HEPARIN SODIUM 7500 UNITS: 5000 INJECTION INTRAVENOUS; SUBCUTANEOUS at 06:44

## 2021-03-25 RX ADMIN — PREGABALIN 25 MG: 25 CAPSULE ORAL at 09:17

## 2021-03-25 RX ADMIN — PREGABALIN 25 MG: 25 CAPSULE ORAL at 20:40

## 2021-03-25 NOTE — PROGRESS NOTES
1900- Bedside shift change report given to KHLOE Thompson (oncoming nurse) by Mena Santiago RN (offgoing nurse). Report included the following information SBAR, Kardex, ED Summary, Intake/Output, MAR, Recent Results, Med Rec Status and Cardiac Rhythm Sinus ernestina. 0700- End of Shift Note    Bedside shift change report given to  (oncoming nurse) by CHUCK Kolb (offgoing nurse). Report included the following information SBAR, Kardex, ED Summary, Intake/Output, MAR, Recent Results, Med Rec Status and Cardiac Rhythm Sinus ernestina    Shift worked:  7p-7a     Shift summary and any significant changes:     Pt still on amalia 15mcg/min. No other changes. Concerns for physician to address:       Zone phone for oncoming shift:          Activity:  Activity Level: Up with Assistance  Number times ambulated in hallways past shift: 0  Number of times OOB to chair past shift: 0    Cardiac:   Cardiac Monitoring: Yes      Cardiac Rhythm: Sinus bradycardia    Access:   Current line(s): PIV     Genitourinary:   Urinary status: voiding    Respiratory:   O2 Device: Room air  Chronic home O2 use?: NO  Incentive spirometer at bedside: NO     GI:  Last Bowel Movement Date: 03/24/21  Current diet:  DIET CARDIAC Regular  Passing flatus: YES  Tolerating current diet: YES  % Diet Eaten: 75 %    Pain Management:   Patient states pain is manageable on current regimen: YES    Skin:  Duke Score: 19  Interventions: turn team, speciality bed, increase time out of bed, PT/OT consult and nutritional support     Patient Safety:  Fall Score:  Total Score: 4  Interventions: bed/chair alarm, gripper socks and pt to call before getting OOB  High Fall Risk: Yes    Length of Stay:  Expected LOS: - - -  Actual LOS: [address] [facility] [address]UCK [address]

## 2021-03-25 NOTE — INTERDISCIPLINARY ROUNDS
Interdisciplinary team rounds were held 3/25/2021 with the following team members:Care Management, Nursing, Nutrition, Pharmacy, Physician and Clinical Coordinator. Plan of care discussed. Goal: see MD orders and progress notes for further interventions and desired outcomes. Jonathan drip remains infusing, may need PICC line for continued infusion.

## 2021-03-25 NOTE — PROGRESS NOTES
NAME: Willard Phipps        :  1945        MRN:  [id number]                  Assessment   :                                               Plan:  [facility]- Dr. Aniya Sullivan - baseline creatinine 1.1. MARIELENA  Dyspnea  Anemia  HTN  Fibromyalgia  hyperkalemia Her UA shows no blood and no protein. US shows no hydro. Her creatinine is better and approaching baseline. Will need to watch volume status. She c/o puffy hands. Continue to hold lisinopril.     Iron status OK. BP remains low on low dose pressor.                 Subjective:     Chief Complaint:  \" I feel much better. \"  No N/V. No dyspnea. No pain. Review of Systems:    Symptom Y/N Comments  Symptom Y/N Comments   Fever/Chills    Chest Pain     Poor Appetite    Edema     Cough    Abdominal Pain     Sputum    Joint Pain     SOB/GATICA    Pruritis/Rash     Nausea/vomit    Tolerating PT/OT     Diarrhea    Tolerating Diet     Constipation    Other       Could not obtain due to:      Objective:     VITALS:   Last 24hrs VS reviewed since prior progress note.  Most recent are:  Visit Vitals  BP (!) 80/44   Pulse 60   Temp 97.9 °F (36.6 °C)   Resp 16   Ht 5' (1.524 m)   Wt 102.5 kg (225 lb 15.5 oz)   SpO2 100%   BMI 44.13 kg/m²       Intake/Output Summary (Last 24 hours) at 3/25/2021 1036  Last data filed at 3/25/2021 0850  Gross per 24 hour   Intake 700 ml   Output 650 ml   Net 50 ml      Telemetry Reviewed:     PHYSICAL EXAM:  General: NAD      Lab Data Reviewed: (see below)    Medications Reviewed: (see below)    PMH/SH reviewed - no change compared to H&P  ________________________________________________________________________  Care Plan discussed with:  Patient     Family      RN     Care Manager                    Consultant:          Comments   >50% of visit spent in counseling and coordination of care ________________________________________________________________________  Mayela Curtis MD     Procedures: see electronic medical records for all procedures/Xrays and details which  were not copied into this note but were reviewed prior to creation of Plan. LABS:  Recent Labs     03/25/21 0244 03/24/21 0411   WBC 5.0 5.3   HGB 9.7* 9.2*   HCT 30.8* 28.7*    200     Recent Labs     03/25/21 0244 03/24/21 0411 03/23/21  1559    133* 133*   K 5.0 4.6 5.2*   * 105 105   CO2 17* 17* 19*   BUN 59* 69* 71*   CREA 1.52* 2.27* 2.38*   GLU 85 85 102*   CA 8.7 8.9 8.9   MG 2.2 2.2  --      Recent Labs     03/25/21 0244 03/24/21 0411 03/23/21  1559   * 345* 464*   TP 6.2* 6.2* 6.9   ALB 3.2* 3.4* 3.4*   GLOB 3.0 2.8 3.5     No results for input(s): INR, PTP, APTT, INREXT in the last 72 hours. Recent Labs     03/25/21 0244   TIBC 234*   PSAT 27   FERR 400*      No results found for: FOL, RBCF   No results for input(s): PH, PCO2, PO2 in the last 72 hours. No results for input(s): CPK, CKMB in the last 72 hours.     No lab exists for component: TROPONINI  No components found for: [place]  Lab Results   Component Value Date/Time    Color YELLOW/STRAW 03/23/2021 05:07 PM    Appearance CLEAR 03/23/2021 05:07 PM    Specific gravity 1.008 03/23/2021 05:07 PM    pH (UA) 5.0 03/23/2021 05:07 PM    Protein Negative 03/23/2021 05:07 PM    Glucose Negative 03/23/2021 05:07 PM    Ketone Negative 03/23/2021 05:07 PM    Bilirubin Negative 03/23/2021 05:07 PM    Urobilinogen 0.2 03/23/2021 05:07 PM    Nitrites Negative 03/23/2021 05:07 PM    Leukocyte Esterase SMALL (A) 03/23/2021 05:07 PM    Epithelial cells FEW 03/23/2021 05:07 PM    Bacteria Negative 03/23/2021 05:07 PM    WBC 0-4 03/23/2021 05:07 PM    RBC 0-5 03/23/2021 05:07 PM       MEDICATIONS:  Current Facility-Administered Medications   Medication Dose Route Frequency    PHENYLephrine (NEIL-SYNEPHRINE) 30 mg in 0.9% sodium chloride 250 mL infusion   mcg/min IntraVENous TITRATE    0.9% sodium chloride infusion  75 mL/hr IntraVENous CONTINUOUS    sodium chloride (NS) flush 5-40 mL  5-40 mL IntraVENous Q8H    sodium chloride (NS) flush 5-40 mL  5-40 mL IntraVENous PRN    acetaminophen (TYLENOL) tablet 650 mg  650 mg Oral Q6H PRN    Or    acetaminophen (TYLENOL) suppository 650 mg  650 mg Rectal Q6H PRN    polyethylene glycol (MIRALAX) packet 17 g  17 g Oral DAILY PRN    promethazine (PHENERGAN) tablet 12.5 mg  12.5 mg Oral Q6H PRN    Or    ondansetron (ZOFRAN) injection 4 mg  4 mg IntraVENous Q6H PRN    heparin (porcine) injection 7,500 Units  7,500 Units SubCUTAneous Q8H    pregabalin (LYRICA) capsule 25 mg  25 mg Oral BID    thiamine mononitrate (B-1) tablet 200 mg  200 mg Oral DAILY

## 2021-03-25 NOTE — PROGRESS NOTES
Progress Note      3/25/2021 4:30 PM  NAME: Dany Ormond   MRN:  [id number]   Admit Diagnosis: MARIELENA (acute kidney injury) ([facility] [address] [N17.9]  Hypotension (arterial) [I95.9]    Date of admission: 3/23/2021  Physician Requesting consult: Dr Sebas Lock         Assessment:     1. Dizziness, weakness, possible dehydration AND hypovolemic (not eating well, takes lasix and lisinopril)    2. Dyspnea   3. MARIELENA over CKD   4. Hypotension, requiring Phenylphrine   5. HTN  6. Anemia  7. 1st degree AV block and IVCD   8. Sinus bradycardia, had present for a long time   9. CAD by calcification of coronary artery on CT             Recommendations:     1. Feeling better after IV fluid,   2. Blood pressure fluctuation, required low dose phenylephrine intermittently, started on midodrine   3. Renal function improved with IV fluid, renal following    4. Hold Lisinopril   5. Hold lasix   6. Monitor on tele      Thank you for allowing me participate in patient care, will cont to follow .           [x]? High complexity decision making was performed         Subjective:     HPI:  No CP or sob. Had low BP AND started on amalia   HR stable occasional bradycardia with HR 50s    ROS: no abd pain, n/v    Objective:      Physical Exam:    Last 24hrs VS reviewed since prior progress note.  Most recent are:    Visit Vitals  BP (!) 131/52   Pulse (!) 50   Temp 98.3 °F (36.8 °C)   Resp 16   Ht 5' (1.524 m)   Wt 102.5 kg (225 lb 15.5 oz)   SpO2 100%   BMI 44.13 kg/m²       Intake/Output Summary (Last 24 hours) at 3/25/2021 1630  Last data filed at 3/25/2021 0850  Gross per 24 hour   Intake 580 ml   Output 650 ml   Net -70 ml          General: Alert and oriented x3, no acute distress   Neck: Supple   Respiratory: No respiratory distress, clear lung sound   Cardiovascular: Regular rate rhythm, S1S2, SM  Abdomen: soft, non tender, non distended   Neuro: moves all extremities, oriented x3   Skin: warm and dry   Extremity: trace  edema, warm to touch        Data Review    Telemetry: normal sinus rhythm, Sinus ernestina        Lab Data Personally Reviewed:    Recent Labs     03/25/21 0244 03/24/21 0411   WBC 5.0 5.3   HGB 9.7* 9.2*   HCT 30.8* 28.7*    200     No results for input(s): INR, PTP, APTT, INREXT in the last 72 hours. Recent Labs     03/25/21  0244 03/24/21 0411 03/23/21  1559    133* 133*   K 5.0 4.6 5.2*   * 105 105   CO2 17* 17* 19*   BUN 59* 69* 71*   CREA 1.52* 2.27* 2.38*   GLU 85 85 102*   CA 8.7 8.9 8.9   MG 2.2 2.2  --      Recent Labs     03/25/21  0244 03/23/21  1836 03/23/21  1559   TROIQ <0.05 <0.05 <0.05     Lab Results   Component Value Date/Time    Cholesterol, total 161 05/18/2017 11:18 AM    HDL Cholesterol 51 05/18/2017 11:18 AM    LDL, calculated 90 05/18/2017 11:18 AM    Triglyceride 100 05/18/2017 11:18 AM       Recent Labs     03/25/21  0244 03/24/21  0411 03/23/21  1559   * 345* 464*   TP 6.2* 6.2* 6.9   ALB 3.2* 3.4* 3.4*   GLOB 3.0 2.8 3.5     No results for input(s): PH, PCO2, PO2 in the last 72 hours.     Medications Personally Reviewed:    Current Facility-Administered Medications   Medication Dose Route Frequency    alum-mag hydroxide-simeth (MYLANTA) oral suspension 30 mL  30 mL Oral Q4H PRN    midodrine (PROAMATINE) tablet 5 mg  5 mg Oral TID WITH MEALS    cosyntropin (CORTROSYN) 0.25 mg in 0.9% sodium chloride TEST injection  0.25 mg IntraVENous ONCE    sodium chloride 0.9 % bolus infusion 1,000 mL  1,000 mL IntraVENous ONCE    PHENYLephrine (NEIL-SYNEPHRINE) 30 mg in 0.9% sodium chloride 250 mL infusion   mcg/min IntraVENous TITRATE    0.9% sodium chloride infusion  75 mL/hr IntraVENous CONTINUOUS    sodium chloride (NS) flush 5-40 mL  5-40 mL IntraVENous Q8H    sodium chloride (NS) flush 5-40 mL  5-40 mL IntraVENous PRN    acetaminophen (TYLENOL) tablet 650 mg  650 mg Oral Q6H PRN    Or    acetaminophen (TYLENOL) suppository 650 mg  650 mg Rectal Q6H PRN    polyethylene glycol (MIRALAX) packet 17 g  17 g Oral DAILY PRN    promethazine (PHENERGAN) tablet 12.5 mg  12.5 mg Oral Q6H PRN    Or    ondansetron (ZOFRAN) injection 4 mg  4 mg IntraVENous Q6H PRN    heparin (porcine) injection 7,500 Units  7,500 Units SubCUTAneous Q8H    pregabalin (LYRICA) capsule 25 mg  25 mg Oral BID    thiamine mononitrate (B-1) tablet 200 mg  200 mg Oral DAILY              Payton Mullins MD

## 2021-03-25 NOTE — PROGRESS NOTES
.End of Shift Note    Bedside shift change report given to Manoj (oncoming nurse) by Kirk Katz (offgoing nurse). Report included the following information SBAR, Kardex and MAR    Shift worked:  0700 - 1900     Shift summary and any significant changes:    Worked with PT today   Jonathan titrated and stopped   Inform Cardiology Dr. Rossana Bustillo patient ernestina down to 40's asymptomatic, per cardiology  in systematic and not sustaining 40's not concern at this time  500 ml bolus given (Per MD)  Cosyntropin ACTH stimulation test started, test reordered and repeated 2nd lab draw missed at shift change                    Concerns for physician to address:  above      Zone phone for oncoming shift:   [phone]       Activity:  Activity Level: Up with Assistance  Number times ambulated in hallways past shift: 0  Number of times OOB to chair past shift: 1    Cardiac:   Cardiac Monitoring: Yes      Cardiac Rhythm: Normal sinus rhythm    Access:   Current line(s): PIV     Genitourinary:   Urinary status: voiding    Respiratory:   O2 Device: Room air  Chronic home O2 use?: NO  Incentive spirometer at bedside: NO     GI:  Last Bowel Movement Date: 03/24/21  Current diet:  DIET CARDIAC Regular  Passing flatus: YES  Tolerating current diet: YES  % Diet Eaten: 75 %    Pain Management:   Patient states pain is manageable on current regimen: YES    Skin:  Duke Score: 19  Interventions: increase time out of bed and PT/OT consult    Patient Safety:  Fall Score:  Total Score: 4  Interventions: pt to call before getting OOB  High Fall Risk: Yes    Length of Stay:  Expected LOS: - - -  Actual LOS: 1      Kirk Katz

## 2021-03-25 NOTE — PROGRESS NOTES
Hospitalist Progress Note    NAME: Tom Galeana   :  1945   MRN:  [id number]     Admit date: 3/23/2021    Today's date: 21    PCP: MD Kulwinder Parker M.D. Cell [phone]    Assessment / Plan:    Arterial hypotension 71/36 overnight, remains on neosynephrine 10 mcg  Baseline essential HTN POA   Bilateral foot pain ? Neuropathy POA  Remains hypotensive, still requiring low dose pressors  No MI  No evidence of VTE        NM V/Q scan normal        LE dopplers negative for DVT bilaterally  No clear sepsis        UA with 0-4 WBC, 0 to 5 RBC, no bacteria        CXR No ASD or edema        CT chest with        Increased alk phos                US with mildly nodular liver, no mass                              Normal GB without stones, CBD 3.7 mm                              Normal spleen size                Check fractionated alk phos        Normal lactate 1.0        Blood culture remains negative        Procalcitonin 0.55  Echo LVEF 45-50%, normal RV size and function, no AS, no MR  Serial HgBs stable, no clear bleeding  Off BP meds  Cardiology following  Cortisol random 6.9 with hypotension, check cosyntropin stim test  Bolus IVF and add midodrine, see if neosyn can be weaned     Acute kidney injury POA admit BUN/creat 71 and 2.38  Chronic kidney disease stage III POA baseline creat 1.15, GFR 48  - Stop lisinopril  Gentle IV hydration, creatinine improving to 1.52   Renal US without hydronephrosis  - Dr Margie Monson following     Fibromyalgia  Continue Lyrica     Morbid obesity POA Body mass index is 44.13 kg/m². Code Status: Full code  Surrogate Decision Maker: Son information on record     DVT Prophylaxis: Subcu heparin  GI Prophylaxis: not indicated     Baseline: Independent       Subjective:     Chief Complaint / Reason for Physician Visit f/u hypotension  \"my legs feel swollen\". Discussed with RN events overnight.    Legs feel swollen  Still hypotensive, remains on low dose neosynephrine  Bilateral distal leg pain  Mild HA, comes and goes    Review of Systems:  Symptom Y/N Comments  Symptom Y/N Comments   Fever/Chills n   Chest Pain n    Poor Appetite    Edema     Cough n   Abdominal Pain n    Sputum    Joint Pain     SOB/GATICA n   Headache n    Nausea/vomit n   Tolerating PT/OT     Diarrhea n   Tolerating Diet y    Constipation    Other       Could NOT obtain due to:      Objective:     VITALS:   Last 24hrs VS reviewed since prior progress note.  Most recent are:  Patient Vitals for the past 24 hrs:   Temp Pulse Resp BP SpO2   03/25/21 1600  (!) 50  (!) 131/52 100 %   03/25/21 1541 98.3 °F (36.8 °C) 60  112/65 100 %   03/25/21 1415  (!) 54  (!) 100/46 100 %   03/25/21 1400  68  108/72 95 %   03/25/21 1212  69  (!) 122/55 100 %   03/25/21 1159  63  110/77 100 %   03/25/21 1109 97.8 °F (36.6 °C) (!) 57 16 91/71 100 %   03/25/21 1048  71  (!) 87/47 95 %   03/25/21 1030  62  (!) 86/47 100 %   03/25/21 1015  60  (!) 80/44 100 %   03/25/21 1006  68  (!) 85/41 99 %   03/25/21 0900  90  (!) 103/53 99 %   03/25/21 0739 97.9 °F (36.6 °C) 63 16 137/61 100 %   03/25/21 0715 97.9 °F (36.6 °C) (!) 55  133/60 100 %   03/25/21 0330 98.2 °F (36.8 °C) 60 16 (!) 120/48 100 %   03/24/21 2315 98 °F (36.7 °C) (!) 58 18 114/60 100 %   03/24/21 2000 97.5 °F (36.4 °C) (!) 56 17 104/63 100 %   03/24/21 1901  80  (!) 98/45 100 %       Intake/Output Summary (Last 24 hours) at 3/25/2021 1837  Last data filed at 3/25/2021 0850  Gross per 24 hour   Intake 80 ml   Output 650 ml   Net -570 ml        Wt Readings from Last 12 Encounters:   03/23/21 102.5 kg (225 lb 15.5 oz)   09/22/20 105.7 kg (233 lb)   03/10/20 103 kg (227 lb)   01/29/20 106.5 kg (234 lb 12.8 oz)   12/16/19 107.2 kg (236 lb 6.4 oz)   11/12/19 106.1 kg (234 lb)   08/19/19 107.5 kg (237 lb 1.6 oz)   08/13/19 107.5 kg (237 lb)   05/06/19 106.1 kg (234 lb)   04/11/19 108.4 kg (239 lb)   04/08/19 107.9 kg (237 lb 12.8 oz)   10/18/18 106.1 kg (234 lb)       PHYSICAL EXAM:    I had a face to face encounter and independently examined this patient on 03/25/21 as outlined below:    General: WD, WN. Alert, cooperative, no acute distress    EENT:  PERRL. Anicteric sclerae. MMM  Neck:  No meningismus, no thyromegaly  Resp:  CTA bilaterally, no wheezing or rales. No accessory muscle use  CV:  Regular  rhythm,  No edema  GI:  Soft, Non distended, Non tender. +Bowel sounds, no rebound  LN:  No cervical or inguinal MARISSA  Neurologic:  Alert and oriented X 3, normal speech, non focal motor exam, no pronator drift  Psych:   Good insight. Not anxious nor agitated  Skin:  No rashes. No jaundice    Reviewed most current lab test results and cultures  YES  Reviewed most current radiology test results   YES  Review and summation of old records today    NO  Reviewed patient's current orders and MAR    YES  PMH/ reviewed - no change compared to H&P  ________________________________________________________________________  Care Plan discussed with:    Comments   Patient x    Family      RN x    Care Manager     Consultant                        Multidiciplinary team rounds were held today with , nursing, pharmacist and clinical coordinator. Patient's plan of care was discussed; medications were reviewed and discharge planning was addressed. ________________________________________________________________________      Comments   >50% of visit spent in counseling and coordination of care     ________________________________________________________________________  Sebas Weber MD     Procedures: see electronic medical records for all procedures/Xrays and details which were not copied into this note but were reviewed prior to creation of Plan. LABS:  I reviewed today's most current labs and imaging studies.   Pertinent labs include:  Recent Labs     03/25/21  0244 03/24/21  0411 03/23/21  1559   WBC 5.0 5.3 4.6   HGB 9. 7* 9.2* 11.2*   HCT 30.8* 28.7* 34.8*    200 199     Recent Labs     03/25/21  0244 03/24/21  0411 03/23/21  1559    133* 133*   K 5.0 4.6 5.2*   * 105 105   CO2 17* 17* 19*   GLU 85 85 102*   BUN 59* 69* 71*   CREA 1.52* 2.27* 2.38*   CA 8.7 8.9 8.9   MG 2.2 2.2  --    ALB 3.2* 3.4* 3.4*   TBILI 0.5 0.5 0.6   ALT 57 62 85*

## 2021-03-25 NOTE — PROGRESS NOTES
Problem: Mobility Impaired (Adult and Pediatric)  Goal: *Acute Goals and Plan of Care (Insert Text)  Description: FUNCTIONAL STATUS PRIOR TO ADMISSION: Patient was independent and active without use of DME.     HOME SUPPORT PRIOR TO ADMISSION: The patient lived alone with family nearby. She will be moving into a home with her son in mid April. Currently lives in a ground floor apartment. Physical Therapy Goals  Initiated 3/25/2021  1. Patient will move from supine to sit and sit to supine , scoot up and down, and roll side to side in bed with independence within 7 day(s). 2.  Patient will transfer from bed to chair and chair to bed with independence using the least restrictive device within 7 day(s). 3.  Patient will perform sit to stand with independence within 7 day(s). 4.  Patient will ambulate with modified independence for 200 feet with the least restrictive device within 7 day(s). Outcome: Progressing Towards Goal   PHYSICAL THERAPY EVALUATION  Patient: Carrie Hdz (56 y.o. female)  Date: 3/25/2021  Primary Diagnosis: MARIELENA (acute kidney injury) ([facility] [address] [N17.9]  Hypotension (arterial) [I95.9]        Precautions:  Fall(hypotension)    ASSESSMENT  Based on the objective data described below, the patient presents with good strength, mildly decreased standing balance and decreased mobility skills following admission for hypotension and MARIELENA on 3/23/21. Her BP improved with activity and the patient denied feeling lightheaded or dizzy. Activity was limited to room due to uncertainty regarding the stability of her BP. Will likely be able to return home to apartment once medical status is stabilized.     Vitals:    03/25/21 1048 03/25/21 1109 03/25/21 1159 03/25/21 1212   BP: (!) 87/47 91/71 110/77 (!) 122/55   BP 1 Location:  Left upper arm Left upper arm Left upper arm   BP Patient Position:  Supine  Comment: hob at 50 degrees Standing  Comment: marching in place Sitting  Comment: post walking and using bathroom toilet   Pulse: 71 (!) 57 63 69   Resp:  16     Temp:  97.8 °F (36.6 °C)     SpO2: 95% % % % RA   Weight:       Height:          Current Level of Function Impacting Discharge (mobility/balance): independent supine to sitting, sba sit to stand and bed to chair transfers. Cg assist ambulation 35 feet. Functional Outcome Measure: The patient scored 60/100 on the Barthel outcome measure which is indicative of 40% functional impairment with need for assistance with ADLs and mobility. Other factors to consider for discharge: lives alone, but son lives in same apartment complex; she will be moving in with him into new home mid April. Patient will benefit from skilled therapy intervention to address the above noted impairments. PLAN :  Recommendations and Planned Interventions: bed mobility training, transfer training, gait training, therapeutic exercises, neuromuscular re-education, edema management/control, patient and family training/education, and therapeutic activities      Frequency/Duration: Patient will be followed by physical therapy:  3 times a week to address goals. Recommendation for discharge: (in order for the patient to meet his/her long term goals)  Physical therapy at least 2 days/week in the home     This discharge recommendation:  Has not yet been discussed the attending provider and/or case management    IF patient discharges home will need the following DME: none       SUBJECTIVE:   Patient stated I took a nutritional product about a month ago and it started to make me feel bad. I stopped it 2 weeks ago but still feel the after effects. 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, removed, pt not sure what type    Fibromyalgia     Hypertension      Past Surgical History:   Procedure Laterality Date    HX COLONOSCOPY      HX DILATION AND CURETTAGE      HX TONSILLECTOMY      as a child       Personal factors and/or comorbidities impacting plan of care: fibromyalgia; djd/ddd of lumbar spine    Home Situation  Home Environment: Apartment  # Steps to Enter: (ground floor apt)  Living Alone: Yes(plans to move into son's next month)  Support Systems: Family member(s), Child(tl)  Patient Expects to be Discharged to[de-identified] Apartment  Current DME Used/Available at Home: Grab bars  Tub or Shower Type: Tub/Shower combination    EXAMINATION/PRESENTATION/DECISION MAKING:   Critical Behavior:  Neurologic State: Alert  Orientation Level: Oriented X4  Cognition: Appropriate decision making, Appropriate for age attention/concentration, Appropriate safety awareness, Follows commands  Safety/Judgement: Awareness of environment, Good awareness of safety precautions, Fall prevention  Hearing: Auditory  Auditory Impairment: None  Skin:  no findings  Edema: moderate in feet  Range Of Motion:  AROM: Within functional limits           PROM: Within functional limits           Strength:    Strength: Within functional limits                    Tone & Sensation:   Tone: Normal              Sensation: Impaired               Coordination:  Coordination: Within functional limits  Vision:   Acuity: Within Defined Limits  Functional Mobility:  Bed Mobility:  Rolling: Independent  Supine to Sit: Independent     Scooting: Independent  Transfers:  Sit to Stand: Stand-by assistance  Stand to Sit: Stand-by assistance        Bed to Chair: Stand-by assistance              Balance:   Sitting: Intact  Standing: Impaired  Standing - Static: Good  Standing - Dynamic : Fair;Occasional  Ambulation/Gait Training:  Distance (ft): 35 Feet (ft)  Assistive Device: Gait belt  Ambulation - Level of Assistance: Contact guard assistance     Gait Description (WDL): Exceptions to WDL  Gait Abnormalities: Trunk sway increased; Path deviations(no lob)  Right Side Weight Bearing: Full  Left Side Weight Bearing: Full  Base of Support: Widened     Speed/Joyce: Pace decreased (<100 feet/min)  Step Length: Left shortened;Right shortened        Interventions: Safety awareness training           Functional Measure:  Barthel Index:    Bathin  Bladder: 10  Bowels: 10  Groomin  Dressing: 10  Feeding: 10  Mobility: 0  Stairs: 0  Toilet Use: 5  Transfer (Bed to Chair and Back): 10  Total: 60/100       The Barthel ADL Index: Guidelines  1. The index should be used as a record of what a patient does, not as a record of what a patient could do. 2. The main aim is to establish degree of independence from any help, physical or verbal, however minor and for whatever reason. 3. The need for supervision renders the patient not independent. 4. A patient's performance should be established using the best available evidence. Asking the patient, friends/relatives and nurses are the usual sources, but direct observation and common sense are also important. However direct testing is not needed. 5. Usually the patient's performance over the preceding 24-48 hours is important, but occasionally longer periods will be relevant. 6. Middle categories imply that the patient supplies over 50 per cent of the effort. 7. Use of aids to be independent is allowed. Sharron Boo., Barthel, DAmparoW. (0663). Functional evaluation: the Barthel Index. [facility] (14)2. DAE Hanley, Luisito De La Cruz., Val Mcrae, [place], [address] [place] [address] (). Measuring the change indisability after inpatient rehabilitation; comparison of the responsiveness of the Barthel Index and Functional [place] Measure. Journal of Neurology, Neurosurgery, and Psychiatry, 66(4), 761-494. Adriana Platt, N.J.A, MORGAN Carballo, & Salma Marcelo, MAmparoA. (2004.) Assessment of post-stroke quality of life in cost-effectiveness studies: The usefulness of the Barthel Index and the EuroQoL-5D.  [facility], 13, 716-66 Physical Therapy Evaluation Charge Determination   History Examination Presentation Decision-Making   HIGH Complexity :3+ comorbidities / personal factors will impact the outcome/ POC  MEDIUM Complexity : 3 Standardized tests and measures addressing body structure, function, activity limitation and / or participation in recreation  MEDIUM Complexity : Evolving with changing characteristics  Other outcome measures Barthel  MEDIUM      Based on the above components, the patient evaluation is determined to be of the following complexity level: MEDIUM    Pain Rating:  None reported    Activity Tolerance:   Fair, SpO2 stable on RA, and requires rest breaks    After treatment patient left in no apparent distress:   Sitting in chair, Call bell within reach, and tray table in front    COMMUNICATION/EDUCATION:   The patients plan of care was discussed with: Registered nurse and Rehabilitation technician. Fall prevention education was provided and the patient/caregiver indicated understanding., Patient/family have participated as able in goal setting and plan of care. , and Patient/family agree to work toward stated goals and plan of care.     Thank you for this referral.  Henry Otoole, PT   Time Calculation: 30 mins

## 2021-03-26 ENCOUNTER — HOME HEALTH ADMISSION (OUTPATIENT)
Dept: HOME HEALTH SERVICES | Facility: HOME HEALTH | Age: 76
End: 2021-03-26

## 2021-03-26 LAB
ALBUMIN SERPL-MCNC: 3 G/DL (ref 3.5–5)
ALBUMIN/GLOB SERPL: 1.1 {RATIO} (ref 1.1–2.2)
ALP BONE SERPL-MCNC: 47 UG/L
ALP SERPL-CCNC: 363 U/L (ref 45–117)
ALT SERPL-CCNC: 51 U/L (ref 12–78)
ANION GAP SERPL CALC-SCNC: 6 MMOL/L (ref 5–15)
AST SERPL-CCNC: 25 U/L (ref 15–37)
BASOPHILS # BLD: 0 K/UL (ref 0–0.1)
BASOPHILS NFR BLD: 1 % (ref 0–1)
BILIRUB SERPL-MCNC: 0.4 MG/DL (ref 0.2–1)
BUN SERPL-MCNC: 41 MG/DL (ref 6–20)
BUN/CREAT SERPL: 38 (ref 12–20)
CALCIUM SERPL-MCNC: 8.6 MG/DL (ref 8.5–10.1)
CHLORIDE SERPL-SCNC: 114 MMOL/L (ref 97–108)
CO2 SERPL-SCNC: 17 MMOL/L (ref 21–32)
CORTIS 1H P CHAL SERPL-MCNC: 32.4 UG/DL
CORTIS 30M P CHAL SERPL-MCNC: 23.2 UG/DL
CORTIS BS SERPL-MCNC: 24.7 UG/DL
CREAT SERPL-MCNC: 1.09 MG/DL (ref 0.55–1.02)
DIFFERENTIAL METHOD BLD: ABNORMAL
EOSINOPHIL # BLD: 0.2 K/UL (ref 0–0.4)
EOSINOPHIL NFR BLD: 4 % (ref 0–7)
ERYTHROCYTE [DISTWIDTH] IN BLOOD BY AUTOMATED COUNT: 13.9 % (ref 11.5–14.5)
GLOBULIN SER CALC-MCNC: 2.8 G/DL (ref 2–4)
GLUCOSE SERPL-MCNC: 109 MG/DL (ref 65–100)
HCT VFR BLD AUTO: 28.9 % (ref 35–47)
HGB BLD-MCNC: 9 G/DL (ref 11.5–16)
IMM GRANULOCYTES # BLD AUTO: 0 K/UL (ref 0–0.04)
IMM GRANULOCYTES NFR BLD AUTO: 1 % (ref 0–0.5)
LYMPHOCYTES # BLD: 0.9 K/UL (ref 0.8–3.5)
LYMPHOCYTES NFR BLD: 21 % (ref 12–49)
MCH RBC QN AUTO: 29.8 PG (ref 26–34)
MCHC RBC AUTO-ENTMCNC: 31.1 G/DL (ref 30–36.5)
MCV RBC AUTO: 95.7 FL (ref 80–99)
MONOCYTES # BLD: 0.3 K/UL (ref 0–1)
MONOCYTES NFR BLD: 7 % (ref 5–13)
NEUTS SEG # BLD: 2.9 K/UL (ref 1.8–8)
NEUTS SEG NFR BLD: 66 % (ref 32–75)
NRBC # BLD: 0 K/UL (ref 0–0.01)
NRBC BLD-RTO: 0 PER 100 WBC
PLATELET # BLD AUTO: 235 K/UL (ref 150–400)
PMV BLD AUTO: 9.7 FL (ref 8.9–12.9)
POTASSIUM SERPL-SCNC: 5.1 MMOL/L (ref 3.5–5.1)
PROT SERPL-MCNC: 5.8 G/DL (ref 6.4–8.2)
RBC # BLD AUTO: 3.02 M/UL (ref 3.8–5.2)
SODIUM SERPL-SCNC: 137 MMOL/L (ref 136–145)
WBC # BLD AUTO: 4.3 K/UL (ref 3.6–11)

## 2021-03-26 PROCEDURE — 97116 GAIT TRAINING THERAPY: CPT

## 2021-03-26 PROCEDURE — 74011250636 HC RX REV CODE- 250/636: Performed by: INTERNAL MEDICINE

## 2021-03-26 PROCEDURE — 85025 COMPLETE CBC W/AUTO DIFF WBC: CPT

## 2021-03-26 PROCEDURE — 80053 COMPREHEN METABOLIC PANEL: CPT

## 2021-03-26 PROCEDURE — 74011250637 HC RX REV CODE- 250/637: Performed by: INTERNAL MEDICINE

## 2021-03-26 PROCEDURE — 36415 COLL VENOUS BLD VENIPUNCTURE: CPT

## 2021-03-26 PROCEDURE — 74011250636 HC RX REV CODE- 250/636: Performed by: STUDENT IN AN ORGANIZED HEALTH CARE EDUCATION/TRAINING PROGRAM

## 2021-03-26 PROCEDURE — 74011250637 HC RX REV CODE- 250/637: Performed by: STUDENT IN AN ORGANIZED HEALTH CARE EDUCATION/TRAINING PROGRAM

## 2021-03-26 RX ORDER — PREDNISONE 20 MG/1
20 TABLET ORAL
Qty: 5 TAB | Refills: 0 | Status: SHIPPED | OUTPATIENT
Start: 2021-03-26 | End: 2021-05-25

## 2021-03-26 RX ADMIN — HEPARIN SODIUM 7500 UNITS: 5000 INJECTION INTRAVENOUS; SUBCUTANEOUS at 06:33

## 2021-03-26 RX ADMIN — HEPARIN SODIUM 7500 UNITS: 5000 INJECTION INTRAVENOUS; SUBCUTANEOUS at 14:08

## 2021-03-26 RX ADMIN — PREGABALIN 25 MG: 25 CAPSULE ORAL at 08:52

## 2021-03-26 RX ADMIN — MIDODRINE HYDROCHLORIDE 5 MG: 5 TABLET ORAL at 08:54

## 2021-03-26 RX ADMIN — Medication 10 ML: at 14:09

## 2021-03-26 RX ADMIN — MIDODRINE HYDROCHLORIDE 5 MG: 5 TABLET ORAL at 11:19

## 2021-03-26 RX ADMIN — SODIUM CHLORIDE 75 ML/HR: 9 INJECTION, SOLUTION INTRAVENOUS at 06:20

## 2021-03-26 RX ADMIN — Medication 200 MG: at 08:52

## 2021-03-26 RX ADMIN — Medication 10 ML: at 06:20

## 2021-03-26 NOTE — PROGRESS NOTES
1945: Bedside and Verbal shift change report given to Bertrand Parks RN (oncoming nurse) by Ana Maria Robert RN (offgoing nurse). Report included the following information SBAR, Kardex, Intake/Output, MAR, Recent Results, Med Rec Status and Cardiac Rhythm NSR w/ 1AVB. Christy in LAB called about past due cortisol lab. Will notify NP to see if lab needs to be redrawn or restart cortisol test.     1947: Hospitalist was paged. SCOOTER Francis on board for tonight     2011: NP said needs to be restarted again. Orders placed. 2024: Baseline ACTH done and sent to lab    2040: IV Cosyntropin 0.25mg scanned and administered. 2115: Second [facility] lab done and sent. 2215: Third ACTH lab done and sent. 0147 03/26/2021: On-call Cardiology paged because HR sustained between 35-39. Patient is asymptomatic. [address] on-call. 0153Dormervin Trevino stated to just monitor. 3069: Paged on-call because patient's HR is sustaining between 32-37. Usually when patient is sleeping, HR is between the 40s-50s. 0518: Dominik said to continue to monitor. 0730: End of Shift Note    Bedside shift change report given to Ana Maria Robert RN (oncoming nurse) by Bertrand Parks (offgoing nurse). Report included the following information SBAR, Kardex, Intake/Output, MAR, Recent Results, Med Rec Status and Cardiac Rhythm SB w/ AVB 1st degree    Shift worked:  [id number]     Shift summary and any significant changes:     Paged cards twice this shift in regards to [address] sustaining in 35s. [address] on-call. Stated continue to monitor     Concerns for physician to address:  SB? Notes from cards stated patient has ICD? Zone phone for oncoming shift:   1517     Activity:  Activity Level:  Up with Assistance  Number times ambulated in hallways past shift: 0  Number of times OOB to chair past shift: 3    Cardiac:   Cardiac Monitoring: Yes      Cardiac Rhythm: Sinus bradycardia    Access:   Current line(s): PIV     Genitourinary:   Urinary status: voiding    Respiratory: O2 Device: Room air  Chronic home O2 use?: NO  Incentive spirometer at bedside: YES     GI:  Last Bowel Movement Date: 03/24/21  Current diet:  DIET CARDIAC Regular  Passing flatus: YES  Tolerating current diet: YES  % Diet Eaten: 80 %    Pain Management:   Patient states pain is manageable on current regimen: YES    Skin:  Duke Score: 21  Interventions: speciality bed, float heels, increase time out of bed and nutritional support     Patient Safety:  Fall Score:  Total Score: 4  Interventions: gripper socks, pt to call before getting OOB and stay with me (per policy)  High Fall Risk: Yes    Length of Stay:  Expected LOS: - - -  Actual LOS: [place] [address]

## 2021-03-26 NOTE — PROGRESS NOTES
Transition of Care Plan:     RUR: 10%     Disposition: Home with family follow up and home health services     Follow up appointments: PCP appointment made on 3/26/21.     DME needed: None     [facility]portation at [facility] to transport.      Keys or means to access home:  Son has her keys.         IM Medicare letter: Second medicare im letter discussed with patient on 3/26/21. [facility] has accepted patient and patient in agreement. FOC obtained and placed with medical records. [facility] has orders and patient may be discharged today or tomorrow per md. Second medicare im letter discussed with patient and she agrees. Signed copy placed with medical records. Amada Mckinney RN BSN CRM        [phone]

## 2021-03-26 NOTE — DISCHARGE INSTRUCTIONS
Patient Discharge Instructions    Dodie Chapman / [id number] : 1945    Admitted 3/23/2021 Discharged: 3/26/2021         DISCHARGE DIAGNOSIS:   Active Problems:    MARIELENA (acute kidney injury) ([facility] [address] (3/23/2021)      Hypotension (arterial) (3/24/2021)      What to do at Home    1. Recommended diet: Cardiac Diet    2. Recommended activity: Activity as tolerated    3. If you experience any of the following symptoms then please call your primary care physician or return to the emergency room if you cannot get hold of your doctor:   Fevers > 100.5, chills   Nausea or vomiting, persistent diarrhea > 24 hours   Blood in stool or black stools   Chest pain or SOB      Follow-up Information     Follow up With Specialties Details Why Aneta Clinton MD Internal Medicine On 2021 For [facility] follow up appointment at 3:45PM. The office will call you if they have an earlier date. [place] [id number] [address] [facility] [address]  In 1 week Take your script for the labs that ordered by your md and have it done at [facility] in one week. The results are to be sent to your pcp office. Jamari Hagen  In 2 weeks Call the office to make a follow up appointment in one week. [address] [facility], [address]  [place], [place]  [phone]        Prednisone x 5 days to help with the ankle pain    Use tylenol as needed for pain, avoid naprosyn or NSAIDS till Dr Bijan Fraire rechecks your labs in 1 week        Information obtained by :  I understand that if any problems occur once I am at home I am to contact my physician. I understand and acknowledge receipt of the instructions indicated above.                                                                                                                                            Physician's or R.N.'s Signature                                                                  Date/Time Patient or Representative Signature                                                          Date/Time    [employer] Activation    Thank you for requesting access to [facility]. Please follow the instructions below to securely access and download your online medical record. [facility] allows you to send messages to your doctor, view your test results, renew your prescriptions, schedule appointments, and more. How Do I Sign Up? 1. In your internet browser, go to [email or web address]  2. Click on the First Time User? Click Here link in the Sign In box. You will be redirect to the New Member Sign Up page. 3. Enter your [facility] Access Code exactly as it appears below. You will not need to use this code after youve completed the sign-up process. If you do not sign up before the expiration date, you must request a new code. [facility] Access Code: [email or web address]  Expires: 2021  1:36 PM (This is the date your [facility] [employer] access code will )    4. Enter the last four digits of your Social Security Number (xxxx) and Date of Birth (mm/dd/yyyy) as indicated and click Submit. You will be taken to the next sign-up page. 5. Create a [facility] ID. This will be your [facility] login ID and cannot be changed, so think of one that is secure and easy to remember. 6. Create a [facility] [employer] password. You can change your password at any time. 7. Enter your Password Reset Question and Answer. This can be used at a later time if you forget your password. 8. Enter your e-mail address. You will receive e-mail notification when new information is available in [address] 9. Click Sign Up. You can now view and download portions of your medical record. 10. Click the Download Summary menu link to download a portable copy of your medical information.     Additional Information    If you have questions, please visit the Frequently Asked Questions section of the [employer] website at [email or web address] Remember, [facility] is NOT to be used for urgent needs. For medical emergencies, dial 911.

## 2021-03-26 NOTE — PROGRESS NOTES
0700 .Bedside and Verbal shift change report given to Kenneth Rojas (oncoming nurse) by Chris Najera (offgoing nurse). Report included the following information SBAR, Kardex and MAR.    0800 Spoke with Cardiologist Dr. Tee Liu. Discussed patient status overnight HR drop to 30's 40's. Dr. Tee Liu stated not intervention at this time, patient is asymptomatic and pay need a pace maker in the future not at this time. See Shanika Spear note for details . Paged hospitalist due to patient having a question about a medication not ordered for discharge. Discharged patient home, PIV removed. Discharge Summary reviewed. Patient picked up by daughter in law and taking down for pickup by PCA.

## 2021-03-26 NOTE — PROGRESS NOTES
Problem: Falls - Risk of  Goal: *Absence of Falls  Description: Document Clydene Bone Fall Risk and appropriate interventions in the flowsheet.   Outcome: Progressing Towards Goal  Note: Fall Risk Interventions:  Mobility Interventions: Assess mobility with egress test, Bed/chair exit alarm, Communicate number of staff needed for ambulation/transfer, Patient to call before getting OOB         Medication Interventions: Assess postural VS orthostatic hypotension, Evaluate medications/consider consulting pharmacy, Patient to call before getting OOB, Teach patient to arise slowly    Elimination Interventions: Bed/chair exit alarm, Call light in reach, Patient to call for help with toileting needs, Stay With Me (per policy)    History of Falls Interventions: Bed/chair exit alarm, Door open when patient unattended, Investigate reason for fall, Room close to nurse's station, Evaluate medications/consider consulting pharmacy         Problem: Patient Education: Go to Patient Education Activity  Goal: Patient/Family Education  Outcome: Progressing Towards Goal     Problem: Hypotension  Goal: *Blood pressure within specified parameters  Outcome: Progressing Towards Goal  Goal: *Fluid volume balance  Outcome: Progressing Towards Goal  Goal: *Labs within defined limits  Outcome: Progressing Towards Goal     Problem: Patient Education: Go to Patient Education Activity  Goal: Patient/Family Education  Outcome: Progressing Towards Goal

## 2021-03-26 NOTE — PROGRESS NOTES
NAME: Briana Perez        :  1945        MRN:  [id number]                  Assessment   :                                               Plan:  Whitney Mas- Dr. Ronald Peres - baseline creatinine 1.1. MARIELENA  Dyspnea  Anemia  HTN  Fibromyalgia  hyperkalemia Her UA shows no blood and no protein. US shows no hydro. Her creatinine is much better and at baseline.     Iron status OK. BP remains low off of pressor. I will sign off. Please call if any questions.                 Subjective:     Chief Complaint:  \" I feel better. Can I go home? \"  No N/V. No dyspnea. No pain. Review of Systems:    Symptom Y/N Comments  Symptom Y/N Comments   Fever/Chills    Chest Pain     Poor Appetite    Edema     Cough    Abdominal Pain     Sputum    Joint Pain     SOB/GATICA    Pruritis/Rash     Nausea/vomit    Tolerating PT/OT     Diarrhea    Tolerating Diet     Constipation    Other       Could not obtain due to:      Objective:     VITALS:   Last 24hrs VS reviewed since prior progress note.  Most recent are:  Visit Vitals  /61 (BP 1 Location: Left upper arm)   Pulse 96   Temp 97 °F (36.1 °C)   Resp 18   Ht 5' (1.524 m)   Wt 102.5 kg (225 lb 15.5 oz)   SpO2 100%   BMI 44.13 kg/m²       Intake/Output Summary (Last 24 hours) at 3/26/2021 1054  Last data filed at 3/26/2021 0400  Gross per 24 hour   Intake 1058.75 ml   Output    Net 1058.75 ml      Telemetry Reviewed:     PHYSICAL EXAM:  General: NAD      Lab Data Reviewed: (see below)    Medications Reviewed: (see below)    PMH/SH reviewed - no change compared to H&P  ________________________________________________________________________  Care Plan discussed with:  Patient     Family      RN     Care Manager                    Consultant:          Comments   >50% of visit spent in counseling and coordination of care ________________________________________________________________________  Jayne Ho MD     Procedures: see electronic medical records for all procedures/Xrays and details which  were not copied into this note but were reviewed prior to creation of Plan. LABS:  Recent Labs     03/26/21 0333 03/25/21 0244   WBC 4.3 5.0   HGB 9.0* 9.7*   HCT 28.9* 30.8*    214     Recent Labs     03/26/21 0333 03/25/21 0244 03/24/21 0411    136 133*   K 5.1 5.0 4.6   * 111* 105   CO2 17* 17* 17*   BUN 41* 59* 69*   CREA 1.09* 1.52* 2.27*   * 85 85   CA 8.6 8.7 8.9   MG  --  2.2 2.2     Recent Labs     03/26/21 0333 03/25/21 0244 03/24/21 0411   * 365* 345*   TP 5.8* 6.2* 6.2*   ALB 3.0* 3.2* 3.4*   GLOB 2.8 3.0 2.8     No results for input(s): INR, PTP, APTT, INREXT, INREXT in the last 72 hours. Recent Labs     03/25/21 0244   TIBC 234*   PSAT 27   FERR 400*      No results found for: FOL, RBCF   No results for input(s): PH, PCO2, PO2 in the last 72 hours. No results for input(s): CPK, CKMB in the last 72 hours.     No lab exists for component: TROPONINI  No components found for: [place]  Lab Results   Component Value Date/Time    Color YELLOW/STRAW 03/23/2021 05:07 PM    Appearance CLEAR 03/23/2021 05:07 PM    Specific gravity 1.008 03/23/2021 05:07 PM    pH (UA) 5.0 03/23/2021 05:07 PM    Protein Negative 03/23/2021 05:07 PM    Glucose Negative 03/23/2021 05:07 PM    Ketone Negative 03/23/2021 05:07 PM    Bilirubin Negative 03/23/2021 05:07 PM    Urobilinogen 0.2 03/23/2021 05:07 PM    Nitrites Negative 03/23/2021 05:07 PM    Leukocyte Esterase SMALL (A) 03/23/2021 05:07 PM    Epithelial cells FEW 03/23/2021 05:07 PM    Bacteria Negative 03/23/2021 05:07 PM    WBC 0-4 03/23/2021 05:07 PM    RBC 0-5 03/23/2021 05:07 PM       MEDICATIONS:  Current Facility-Administered Medications   Medication Dose Route Frequency    alum-mag hydroxide-simeth (MYLANTA) oral suspension 30 mL  30 mL Oral Q4H PRN    midodrine (PROAMATINE) tablet 5 mg  5 mg Oral TID WITH MEALS    PHENYLephrine (NEIL-SYNEPHRINE) 30 mg in 0.9% sodium chloride 250 mL infusion   mcg/min IntraVENous TITRATE    0.9% sodium chloride infusion  75 mL/hr IntraVENous CONTINUOUS    sodium chloride (NS) flush 5-40 mL  5-40 mL IntraVENous Q8H    sodium chloride (NS) flush 5-40 mL  5-40 mL IntraVENous PRN    acetaminophen (TYLENOL) tablet 650 mg  650 mg Oral Q6H PRN    Or    acetaminophen (TYLENOL) suppository 650 mg  650 mg Rectal Q6H PRN    polyethylene glycol (MIRALAX) packet 17 g  17 g Oral DAILY PRN    promethazine (PHENERGAN) tablet 12.5 mg  12.5 mg Oral Q6H PRN    Or    ondansetron (ZOFRAN) injection 4 mg  4 mg IntraVENous Q6H PRN    heparin (porcine) injection 7,500 Units  7,500 Units SubCUTAneous Q8H    pregabalin (LYRICA) capsule 25 mg  25 mg Oral BID    thiamine mononitrate (B-1) tablet 200 mg  200 mg Oral DAILY

## 2021-03-26 NOTE — PROGRESS NOTES
Progress Note      3/26/2021 4:30 PM  NAME: Estevan Miranda   MRN:  [id number]   Admit Diagnosis: MARIELENA (acute kidney injury) ([facility] [address] [N17.9]  Hypotension (arterial) [I95.9]    Date of admission: 3/23/2021  Physician Requesting consult: Dr Arun Ratliff         Assessment:     1. Dizziness, weakness, possible dehydration AND hypovolemic (not eating well, takes lasix and lisinopril)    2. Dyspnea   3. MARIELENA over CKD   4. 1st degree AV block and IVCD   5. Sinus bradycardia, had present for a long time, at night HR dropped to 30-40s, HR 50-60s when awake and on ambulation goes up to 90-100s. 6. Hypotension, requiring Phenylphrine   7. HTN  8. Anemia  9. CAD by calcification of coronary artery on CT             Recommendations:     1. Feeling better after IV fluid, Blood pressure fluctuation, required low dose phenylephrine intermittently, started on midodrine   2. Had HR of 35s when sleeping, HR 50-60s when awake and goes up to 90s with ambulation, she felt dizzy and weak before arrival but symptoms had been improved/resolved with IV hydration > no indication for PM for now, may need it in future, will reassess HR upon discharge with 48 hr Holter monitor   3. Renal function improved with IV fluid, renal following    4. Hold Lisinopril   5. Hold lasix   6. Monitor on tele   7. Had been tested negative for sleep apnea in past       Thank you for allowing me participate in patient care, will cont to follow .            [x]? High complexity decision making was performed         Subjective:     HPI:  No CP or sob. Feels better says symptoms are resolved and improved     ROS: no abd pain, n/v    Objective:      Physical Exam:    Last 24hrs VS reviewed since prior progress note.  Most recent are:    Visit Vitals  /60 (BP 1 Location: Left upper arm)   Pulse (!) 57   Temp 98 °F (36.7 °C)   Resp 16   Ht 5' (1.524 m)   Wt 102.5 kg (225 lb 15.5 oz)   SpO2 100%   BMI 44.13 kg/m²       Intake/Output Summary (Last 24 hours) at 3/26/2021 5916  Last data filed at 3/26/2021 0400  Gross per 24 hour   Intake 1378.75 ml   Output    Net 1378.75 ml          General: Alert and oriented x3, no acute distress   Neck: Supple   Respiratory: No respiratory distress, clear lung sound   Cardiovascular: Regular rate rhythm, S1S2, SM  Abdomen: soft, non tender, non distended   Neuro: moves all extremities, oriented x3   Skin: warm and dry   Extremity: trace  edema, warm to touch        Data Review    Telemetry: normal sinus rhythm, Sinus ernestina        Lab Data Personally Reviewed:    Recent Labs     03/26/21 0333 03/25/21 0244   WBC 4.3 5.0   HGB 9.0* 9.7*   HCT 28.9* 30.8*    214     No results for input(s): INR, PTP, APTT, INREXT, INREXT in the last 72 hours. Recent Labs     03/26/21 0333 03/25/21  0244 03/24/21  0411    136 133*   K 5.1 5.0 4.6   * 111* 105   CO2 17* 17* 17*   BUN 41* 59* 69*   CREA 1.09* 1.52* 2.27*   * 85 85   CA 8.6 8.7 8.9   MG  --  2.2 2.2     Recent Labs     03/25/21  0244 03/23/21  1836 03/23/21  1559   TROIQ <0.05 <0.05 <0.05     Lab Results   Component Value Date/Time    Cholesterol, total 161 05/18/2017 11:18 AM    HDL Cholesterol 51 05/18/2017 11:18 AM    LDL, calculated 90 05/18/2017 11:18 AM    Triglyceride 100 05/18/2017 11:18 AM       Recent Labs     03/26/21  0333 03/25/21  0244 03/24/21  0411   * 365* 345*   TP 5.8* 6.2* 6.2*   ALB 3.0* 3.2* 3.4*   GLOB 2.8 3.0 2.8     No results for input(s): PH, PCO2, PO2 in the last 72 hours.     Medications Personally Reviewed:    Current Facility-Administered Medications   Medication Dose Route Frequency    alum-mag hydroxide-simeth (MYLANTA) oral suspension 30 mL  30 mL Oral Q4H PRN    midodrine (PROAMATINE) tablet 5 mg  5 mg Oral TID WITH MEALS    PHENYLephrine (NEIL-SYNEPHRINE) 30 mg in 0.9% sodium chloride 250 mL infusion   mcg/min IntraVENous TITRATE    0.9% sodium chloride infusion  75 mL/hr IntraVENous CONTINUOUS    sodium chloride (NS) flush 5-40 mL  5-40 mL IntraVENous Q8H    sodium chloride (NS) flush 5-40 mL  5-40 mL IntraVENous PRN    acetaminophen (TYLENOL) tablet 650 mg  650 mg Oral Q6H PRN    Or    acetaminophen (TYLENOL) suppository 650 mg  650 mg Rectal Q6H PRN    polyethylene glycol (MIRALAX) packet 17 g  17 g Oral DAILY PRN    promethazine (PHENERGAN) tablet 12.5 mg  12.5 mg Oral Q6H PRN    Or    ondansetron (ZOFRAN) injection 4 mg  4 mg IntraVENous Q6H PRN    heparin (porcine) injection 7,500 Units  7,500 Units SubCUTAneous Q8H    pregabalin (LYRICA) capsule 25 mg  25 mg Oral BID    thiamine mononitrate (B-1) tablet 200 mg  200 mg Oral DAILY              Merlinda Moh, MD

## 2021-03-26 NOTE — PROGRESS NOTES
Transition of Care Plan:     RUR: 10%     Disposition: Home with family follow up and home health services     Follow up appointments: To be made prior to discharge. DME needed: None     Transportation at Discharge: Family to transport. Keys or means to access home:  Son has her keys. IM Medicare letter: To be given prior to discharge . Discussed dcp with patient and she is open to home health services. Choice given and she is open. Patient is ACO and referral sent to [facility] and will await their response. Amada Mckinney  RN BSN CRM        [phone]

## 2021-03-26 NOTE — INTERDISCIPLINARY ROUNDS
Interdisciplinary team rounds were held 3/26/2021 with the following team members:Care Management, Nursing, Nutrition, Pharmacy, Physician and Charge RN. Plan of care discussed. See clinical pathway and/or care plan for interventions and desired outcomes. Discharge home health with family today or tomorrow.

## 2021-03-26 NOTE — ROUTINE PROCESS
PCP TOMMIE apt scheduled with Dr. Bhakti Holman on 4/14/21(currently earliest avail apt). Practice will contact patient with earliest avail apt.

## 2021-03-26 NOTE — PROGRESS NOTES
Hospitalist Discharge Note    NAME: Orien Aase   :  1945   MRN:  [id number]     Admit date: 3/23/2021    Discharge date: 21    PCP: Reanna Ozuna MD    Discharge Diagnoses:    Arterial hypotension 71/36 POA required neosynephrine    Suspected hypovolemia POA with hypotension and MARIELENA    Baseline essential HTN POA     Bilateral foot pain ? Neuropathy or gout POA on 5 days of prednisone     Acute kidney injury POA admit BUN/creat 71 and 2.38    Chronic kidney disease stage III POA baseline creat 1.15, GFR 48     Fibromyalgia     Morbid obesity POA Body mass index is 44.13 kg/m². Code Status: Full code    Discharge Medications:  Current Discharge Medication List      START taking these medications    Details   predniSONE (DELTASONE) 20 mg tablet Take 20 mg by mouth daily (with breakfast). Qty: 5 Tab, Refills: 0         CONTINUE these medications which have NOT CHANGED    Details   traMADoL (ULTRAM) 50 mg tablet TAKE 1 TABLET BY MOUTH EVERY 6 HOURS AS NEEDED FOR PAIN UP TO  30 DAYS . DO NOT EXCEED 4 PER 24 HOURS  Qty: 30 Tab, Refills: 0    Associated Diagnoses: Fibromyalgia      pregabalin (Lyrica) 25 mg capsule Take 25 mg by mouth as needed. cetirizine (ZyrTEC) 10 mg tablet Take 5 mg by mouth two (2) times a day. fluticasone propionate (Flonase Allergy Relief) 50 mcg/actuation nasal spray 2 Sprays by Both Nostrils route daily. acetaminophen (TYLENOL) 325 mg tablet Take  by mouth every four (4) hours as needed for Pain. STOP taking these medications       furosemide (LASIX) 20 mg tablet Comments:   Reason for Stopping:         lisinopriL (PRINIVIL, ZESTRIL) 10 mg tablet Comments:   Reason for Stopping:         naproxen (NAPROSYN) 500 mg tablet Comments:   Reason for Stopping:         LORazepam (ATIVAN) 1 mg tablet Comments:   Reason for Stopping:                Follow-up Information     Follow up With Specialties Details Why [address], Eneida Moura MD Internal Medicine On 4/14/2021 For [facility] hospital follow up appointment at 3:45PM  [address] [place] [address]  [phone]            Time spent on discharge:   I spent greater than 30 minutes on discharge, seeing and examining the patient, reconciling home meds and new meds, coordinating care with case management, doing the discharge papers and the D/C summary    Discharge disposition: home, refused home health    Discharge Condition: Stable    Summary of admission H+P(copied from Dr Blakely's Note):     CHIEF COMPLAINT: shortness of breath     HISTORY OF PRESENT ILLNESS:     Yobani Chávez is a 70 y.o.  female who presents with complaints shortness of breath on excretion that had worsened over the past 4 weeks. No orthopnea, no PND. No heart palpiations. No chest pain.          Foot pain that she describes like a million needles and has worsened in the last few weeks but had been going on for 15 years. Chronic back pains that she gets ESIs for   She had been getting naproxen to control her back and foot pains     She also described having difficulty raising hands and doing her hair.      Has not worked since march and believes she had lost a lot of muscle power.        Has been on and off Lyrica as she feels \"doupy\" when she takes it. And working with her primary provider to decrease the dose until she reached the 25 mg      She had a fall 2 weeks ago, feel straight on her knees after rushing out through her door way as her granddaughter opened it for her.      Has history of HTN, fibromyalgia and CKD stage III secondary to NSAID use      In the ED her evaluation included VQ scan of the chest which was reported as low probability, her creatinine was more than 2 with EGFR less than 30, AST and ALT were mildly elevated alkaline phosphatase was 464, troponin was negative, proBNP was almost 1600.        We were asked to admit for work up and evaluation of the above problems.         Past Medical History:   Diagnosis Date    Adverse effect of anesthesia       combative with anesthesia    Arthritis      Cancer ([facility] [address]       tumor on nose, removed, pt not sure what type    Fibromyalgia      Hypertension        pCXR read by radiology FINDINGS:   Single AP portable view of the chest obtained at 1554 demonstrates a  normal cardiomediastinal silhouette. The lungs are clear bilaterally. No osseous  abnormalities are seen. IMPRESSION  No evidence of acute cardiopulmonary process.      Admit chest CT read by radiology FINDINGS:  CHEST WALL: No mass or axillary lymphadenopathy. THYROID: No nodule. MEDIASTINUM: No mass or lymphadenopathy. CHAPARRITA: No mass or lymphadenopathy. THORACIC AORTA: No aneurysm. MAIN PULMONARY ARTERY: Normal in caliber. TRACHEA/BRONCHI: Patent. ESOPHAGUS: No wall thickening or dilatation. HEART: Coronary atherosclerotic disease. Normal heart size. PLEURA: No effusion or pneumothorax. LUNGS: No nodule, mass, or airspace disease. INCIDENTALLY IMAGED UPPER ABDOMEN: No significant abnormality in the  incidentally imaged upper abdomen. BONES: Multilevel degenerative changes in the thoracic spine. IMPRESSION  No acute cardiopulmonary process. Coronary atherosclerotic disease. NM perfusion scan read by radiology results:  Indication: Shortness of breath. A perfusion scan was performed with 4 mCi Tc MAA injected intravenously. Perfusion images demonstrates no segmental abnormalities. IMPRESSION  1. Normal perfusion scan. Bilateral LE dopplers read by radiology results:  Right Lower Venous  No evidence of deep vein thrombosis in the right lower extremity. The common femoral, great saphenous, great saphenous thigh, femoral, proximal femoral, mid femoral, distal femoral, popliteal, posterior tibial and saphenous femoral junction vein(s) were imaged in the transverse and longitudinal planes. The vessels showed normal color filling and compressibility. Doppler interrogation of the veins showed phasic and spontaneous flow. The peroneal vein(s) were not well visualized. Left Lower Venous  No evidence of deep vein thrombosis in the left lower extremity. The common femoral, great saphenous, saphenous femoral junction, greater saphenous thigh, femoral, proximal femoral, mid femoral, distal femoral, popliteal and posterior tibial vein(s) were imaged in the transverse and longitudinal planes. The vessels showed normal color filling and compressibility. Doppler interrogation of the veins showed phasic and spontaneous flow. The peroneal vein(s) were not well visualized. Abdominal US read by radiology FINDINGS:  LIVER:   The liver is heterogeneous  with no mass or other focal abnormality. The liver  appears mildly nodular. LIVER VASCULATURE:   The portal vein flow is hepatopedal.  GALLBLADDER:  The gallbladder is normal and no stones are identified. There is no wall  thickening or fluid around the gallbladder. COMMON BILE DUCT:  There is no biliary duct dilatation and the common duct measures 3.7 mm in  diameter. PANCREAS:  The visualized pancreas is normal.   SPLEEN:  The spleen is normal in echotexture and size and measures 9.9 cm in length. RIGHT KIDNEY:  The right kidney demonstrates normal echogenicity with no mass, stone or  hydronephrosis. The right kidney measures 8.3 cm in length. LEFT KIDNEY:  The left kidney demonstrates normal echogenicity with no mass, stone or  hydronephrosis. The left kidney measures 7.9 cm in length. RETROPERITONEUM:  The aorta tapers normally. The IVC is normal.  IMPRESSION  1. Heterogeneous nodular liver concerning for cirrhosis. 2. Small kidneys.     Echo TTE read by cardiology  Left Ventricle Normal cavity size and wall thickness. The estimated EF is 45 - 50%. Mildly reduced systolic function. There is mild (grade 1) left ventricular diastolic dysfunction left ventricular diastolic dysfunction.    Left Atrium Normal cavity size. Right Ventricle Normal cavity size and global systolic function. Right Atrium Normal cavity size. Aortic Valve Normal valve structure, no stenosis and no regurgitation. Aortic valve sclerosis. Mitral Valve Normal valve structure, no stenosis and no regurgitation. Mitral valve thickening. There is anterior leaflet thickening. Mitral annular calcification. Tricuspid Valve Normal valve structure and no stenosis. Trace regurgitation. Right Ventricular Arterial Pressure (RVSP) is 24 mmHg. Pulmonary hypertension not suggested by Doppler findings. Pulmonic Valve Normal valve structure, no stenosis and no regurgitation. Aorta Dilated ascending aorta. Pulmonary Artery Normal pulmonary arteries. IVC/Hepatic Veins Normal structure. Pericardium No evidence of pericardial effusion. Hospital course:       Arterial hypotension 71/36 POA required neosynephrine  Suspected hypovolemia POA with hypotension and MARIELENA  Baseline essential HTN POA   Bilateral foot pain ?  Neuropathy POA  Initially remained hypotensive, still requiring low dose neosynephrine  Acute kidney injury at admit BUN/creat 71 and 2.38  Ultimately suspect she was hypovolemic  Received IVF  Lasix and lisinopril were held  No MI, serial troponins negative  No evidence of VTE        NM V/Q scan normal        LE dopplers negative for DVT bilaterally  No clear sepsis        UA with 0-4 WBC, 0 to 5 RBC, no bacteria        CXR No ASD or edema        CT chest with no ASD or edema        Increased alk phos                US with mildly nodular liver, no mass                              Normal GB without stones, CBD 3.7 mm                              Normal spleen size                Testing suggested bone source for the elevated alk phos        Normal lactate 1.0        Blood culture remains negative        Procalcitonin 0.55  Echo LVEF 45-50%, normal RV size and function, no AS, no MR  Serial HgBs stable, no clear bleeding  Cosyntropin ACTH stim test baseline 24.7, then 23.2 and 32.4 after ACTH  Hypotension resolved with IVF and midodrine, pressors weaned off  Off BP meds, will hold till PCP follow up        Non anion gap, hyperchloremic acidosis due to IVF  Cardiology followed  Midodrine weaned off before discharge  Prednisone x 5 days with foot pain, ? Gout  Pt declined home health, will do outpatient PT, I gave her a script     Acute kidney injury POA admit BUN/creat 71 and 2.38  Chronic kidney disease stage III POA baseline creat 1.15, GFR 48  - Stop lisinopril  Gentle IV hydration, creatinine improving to 1.09   Renal US without hydronephrosis  - Dr Goran Pereira followed  - suspected hypovolemia     Fibromyalgia  Continue Lyrica     Morbid obesity POA Body mass index is 44.13 kg/m². Code Status: Full code  Surrogate Decision Maker: Son information on record     DVT Prophylaxis: Sub-q heparin  GI Prophylaxis: not indicated     Baseline: Independent       Subjective:     Chief Complaint / Reason for Physician Visit f/u hypotension  \"my legs are sore\". Discussed with RN events overnight. Legs feel swollen and sore, neetu her feet  Weaned off pressors with IVF  Feels ready to go home    Review of Systems:  Symptom Y/N Comments  Symptom Y/N Comments   Fever/Chills n   Chest Pain n    Poor Appetite    Edema     Cough n   Abdominal Pain n    Sputum    Joint Pain     SOB/GATICA n   Headache n    Nausea/vomit n   Tolerating PT/OT     Diarrhea n   Tolerating Diet y    Constipation    Other       Could NOT obtain due to:      Objective:     VITALS:   Last 24hrs VS reviewed since prior progress note.  Most recent are:  Patient Vitals for the past 24 hrs:   Temp Pulse Resp BP SpO2   03/26/21 1416  (!) 52  (!) 131/100 100 %   03/26/21 1047 97 °F (36.1 °C) 96 18 138/61 100 %   03/26/21 0737 98 °F (36.7 °C) (!) 57 16 139/60 100 %   03/26/21 0400 97.8 °F (36.6 °C) (!) 41 16 (!) 132/54 99 %   03/26/21 0000  (!) 58  (!) 132/56 98 % 03/25/21 2247  (!) 59  131/64 97 %   03/25/21 2246 98 °F (36.7 °C) (!) 58 14 131/64 99 %   03/25/21 2200  (!) 56  (!) 113/57 98 %   03/25/21 2102  60  138/74 100 %   03/25/21 2030  (!) 50  (!) 126/53 100 %   03/25/21 2000 97.9 °F (36.6 °C) (!) 58 17 130/62 100 %   [birth year]/1947  (!) 56  111/86 99 %   03/25/21 1945  60  121/78 100 %   03/25/21 1930  62  (!) 119/58 100 %   03/25/21 1915  64  122/86 100 %   03/25/21 1900  (!) 53  (!) 110/50 100 %   03/25/21 1845  (!) 47  123/63 100 %   03/25/21 1600  (!) 50  (!) 131/52 100 %   03/25/21 1541 98.3 °F (36.8 °C) 60  112/65 100 %       Intake/Output Summary (Last 24 hours) at 3/26/2021 1525  Last data filed at 3/26/2021 1047  Gross per 24 hour   Intake 2233.75 ml   Output    Net 2233.75 ml        Wt Readings from Last 12 Encounters:   03/23/21 102.5 kg (225 lb 15.5 oz)   09/22/20 105.7 kg (233 lb)   03/10/20 103 kg (227 lb)   01/29/20 106.5 kg (234 lb 12.8 oz)   12/16/19 107.2 kg (236 lb 6.4 oz)   11/12/19 106.1 kg (234 lb)   08/19/19 107.5 kg (237 lb 1.6 oz)   08/13/19 107.5 kg (237 lb)   05/06/19 106.1 kg (234 lb)   04/11/19 108.4 kg (239 lb)   04/08/19 107.9 kg (237 lb 12.8 oz)   10/18/18 106.1 kg (234 lb)       PHYSICAL EXAM:    I had a face to face encounter and independently examined this patient on 03/26/21 as outlined below:    General: WD, WN. Alert, cooperative, no acute distress    EENT:  PERRL. Anicteric sclerae. MMM  Resp:  CTA bilaterally, no wheezing or rales. No accessory muscle use  CV:  Regular  rhythm,  No edema  GI:  Soft, Non distended, Non tender. +Bowel sounds, no rebound  Neurologic:  Alert and oriented X 3, normal speech, non focal motor exam, no pronator drift  Psych:   Good insight. Not anxious nor agitated  Skin:  No rashes.   No jaundice    Reviewed most current lab test results and cultures  YES  Reviewed most current radiology test results   YES  Review and summation of old records today    NO  Reviewed patient's current orders and MAR    YES  PMH/SH reviewed - no change compared to H&P  ________________________________________________________________________  Care Plan discussed with:    Comments   Patient x    Family      RN x    Care Manager     Consultant                        Multidiciplinary team rounds were held today with , nursing, pharmacist and clinical coordinator. Patient's plan of care was discussed; medications were reviewed and discharge planning was addressed. ________________________________________________________________________      Comments   >50% of visit spent in counseling and coordination of care     ________________________________________________________________________  Pippa Zhang MD     Procedures: see electronic medical records for all procedures/Xrays and details which were not copied into this note but were reviewed prior to creation of Plan. LABS:  I reviewed today's most current labs and imaging studies.   Pertinent labs include:  Recent Labs     03/26/21 0333 03/25/21 0244 03/24/21 0411   WBC 4.3 5.0 5.3   HGB 9.0* 9.7* 9.2*   HCT 28.9* 30.8* 28.7*    214 200     Recent Labs     03/26/21 0333 03/25/21 0244 03/24/21 0411    136 133*   K 5.1 5.0 4.6   * 111* 105   CO2 17* 17* 17*   * 85 85   BUN 41* 59* 69*   CREA 1.09* 1.52* 2.27*   CA 8.6 8.7 8.9   MG  --  2.2 2.2   ALB 3.0* 3.2* 3.4*   TBILI 0.4 0.5 0.5   ALT 51 57 62

## 2021-03-26 NOTE — PROGRESS NOTES
Transition of Care Plan:     RUR: 10%     Disposition: Home with family follow up and outpatient physical therapy.     Follow up appointments: PCP appointment made on 3/26/21.     DME needed: None     [facility]portation at [facility] to transport.      Keys or means to access home:  Son has her keys.         IM Medicare letter: Second medicare im letter discussed with patient on 3/26/21. Patient would prefer now to have outpatient physical therapy. Informed md and he will give her a script for outpatient pt services. She has a facility near her where she will go. [facility] them. Amada Mckinney RN BSN CRM        [phone]

## 2021-03-27 VITALS
OXYGEN SATURATION: 98 % | RESPIRATION RATE: 18 BRPM | DIASTOLIC BLOOD PRESSURE: 63 MMHG | SYSTOLIC BLOOD PRESSURE: 103 MMHG | BODY MASS INDEX: 44.36 KG/M2 | WEIGHT: 225.97 LBS | HEART RATE: 63 BPM | TEMPERATURE: 97.3 F | HEIGHT: 60 IN

## 2021-03-29 ENCOUNTER — PATIENT OUTREACH (OUTPATIENT)
Dept: CASE MANAGEMENT | Age: 76
End: 2021-03-29

## 2021-03-30 LAB
BACTERIA SPEC CULT: NORMAL
SERVICE CMNT-IMP: NORMAL

## 2021-03-30 NOTE — PROGRESS NOTES
Care Transitions Initial Follow Up Call    Call within 2 business days of discharge: Yes     Patient: Nicole Thomas Patient : 1945 MRN: [id number]    Last Discharge [address]       Complaint Diagnosis Description Type Department Provider    3/23/21 Fatigue; Fall; Foot Pain Dyspnea on exertion . .. ED to Hosp-Admission (Discharged) (ADMIT) [id number] Liudmila Sage MD; Enrrique August... Was this an external facility discharge? No  Challenges to be reviewed by the provider   - HR of 35 when sleeping, 50-60s when wakes up goes goes up to 90 with ambulation, needs cardiac follow up for holter monitor   - unable to reach patient for [employer] call   - wants outpatient PT  - follow up labs? Cr back to baseline 1.09 at d/c, BUN 41      Method of communication with provider : chart routing    Discussed COVID-19 related testing which was available at this time. Test results were negative. Patient informed of results, if available? n/a    Advance Care Planning:   Does patient have an Advance Directive: not on file     Inpatient Readmission Risk score: Unplanned Readmit Risk Score: 9      START taking:  predniSONE (DELTASONE)  STOP taking:  furosemide 20 mg tablet (LASIX)  lisinopriL 10 mg tablet (PRINIVIL, ZESTRIL)  LORazepam 1 mg tablet (ATIVAN)  naproxen 500 mg tablet (NAPROSYN)  Review your updated medication list below. Was this a readmission? no   Patient stated reason for the admission: n/a    Patients top risk factors for readmission: lack of knowledge about disease, medical condition and utilization of services , unable to reach patient for [employer] call       Home Health/Outpatient orders at discharge: none - patient wants outpatient PT    Durable Medical Equipment ordered at discharge:none        REHABILITATION [facility] follow up appointment(s): No future appointments. Non-[facility] follow up appointment(s): ortho 3/31    03/30/21  Unable to reach patient for [employer] call. Ctn to attempt to reach patient in 5 business days. AR

## 2021-04-01 NOTE — DISCHARGE SUMMARY
Hospitalist Discharge Note    NAME: Romayne Slocumb   :  1945   MRN:  [id number]     Admit date: 3/23/2021    Discharge date: 21    PCP: Marj Mejia MD    Discharge Diagnoses:    Arterial hypotension 71/36 POA required neosynephrine    Suspected hypovolemia POA with hypotension and MARIELENA    Baseline essential HTN POA     Bilateral foot pain ? Neuropathy or gout POA on 5 days of prednisone     Acute kidney injury POA admit BUN/creat 71 and 2.38    Chronic kidney disease stage III POA baseline creat 1.15, GFR 48     Fibromyalgia     Morbid obesity POA Body mass index is 44.13 kg/m². Code Status: Full code    Discharge Medications:  Current Discharge Medication List      START taking these medications    Details   predniSONE (DELTASONE) 20 mg tablet Take 20 mg by mouth daily (with breakfast). Qty: 5 Tab, Refills: 0         CONTINUE these medications which have NOT CHANGED    Details   traMADoL (ULTRAM) 50 mg tablet TAKE 1 TABLET BY MOUTH EVERY 6 HOURS AS NEEDED FOR PAIN UP TO  30 DAYS . DO NOT EXCEED 4 PER 24 HOURS  Qty: 30 Tab, Refills: 0    Associated Diagnoses: Fibromyalgia      pregabalin (Lyrica) 25 mg capsule Take 25 mg by mouth as needed. cetirizine (ZyrTEC) 10 mg tablet Take 5 mg by mouth two (2) times a day. fluticasone propionate (Flonase Allergy Relief) 50 mcg/actuation nasal spray 2 Sprays by Both Nostrils route daily. acetaminophen (TYLENOL) 325 mg tablet Take  by mouth every four (4) hours as needed for Pain. STOP taking these medications       furosemide (LASIX) 20 mg tablet Comments:   Reason for Stopping:         lisinopriL (PRINIVIL, ZESTRIL) 10 mg tablet Comments:   Reason for Stopping:         naproxen (NAPROSYN) 500 mg tablet Comments:   Reason for Stopping:         LORazepam (ATIVAN) 1 mg tablet Comments:   Reason for Stopping:                Follow-up Information     Follow up With Specialties Details Why [address], Luci Narayan MD Internal Medicine On 4/14/2021 For [facility] hospital follow up appointment at 3:45PM  [address] [place] [address]  [phone]            Time spent on discharge:   I spent greater than 30 minutes on discharge, seeing and examining the patient, reconciling home meds and new meds, coordinating care with case management, doing the discharge papers and the D/C summary    Discharge disposition: home, refused home health    Discharge Condition: Stable    Summary of admission H+P(copied from Dr Blakely's Note):     CHIEF COMPLAINT: shortness of breath     HISTORY OF PRESENT ILLNESS:     Juan Pablo Wolfe is a 70 y.o.  female who presents with complaints shortness of breath on excretion that had worsened over the past 4 weeks. No orthopnea, no PND. No heart palpiations. No chest pain.          Foot pain that she describes like a million needles and has worsened in the last few weeks but had been going on for 15 years. Chronic back pains that she gets ESIs for   She had been getting naproxen to control her back and foot pains     She also described having difficulty raising hands and doing her hair.      Has not worked since march and believes she had lost a lot of muscle power.        Has been on and off Lyrica as she feels \"doupy\" when she takes it. And working with her primary provider to decrease the dose until she reached the 25 mg      She had a fall 2 weeks ago, feel straight on her knees after rushing out through her door way as her granddaughter opened it for her.      Has history of HTN, fibromyalgia and CKD stage III secondary to NSAID use      In the ED her evaluation included VQ scan of the chest which was reported as low probability, her creatinine was more than 2 with EGFR less than 30, AST and ALT were mildly elevated alkaline phosphatase was 464, troponin was negative, proBNP was almost 1600.        We were asked to admit for work up and evaluation of the above problems.         Past Medical History:   Diagnosis Date    Adverse effect of anesthesia       combative with anesthesia    Arthritis      Cancer ([facility] [address]       tumor on nose, removed, pt not sure what type    Fibromyalgia      Hypertension        pCXR read by radiology FINDINGS:   Single AP portable view of the chest obtained at 1554 demonstrates a  normal cardiomediastinal silhouette. The lungs are clear bilaterally. No osseous  abnormalities are seen. IMPRESSION  No evidence of acute cardiopulmonary process.      Admit chest CT read by radiology FINDINGS:  CHEST WALL: No mass or axillary lymphadenopathy. THYROID: No nodule. MEDIASTINUM: No mass or lymphadenopathy. CHAPARRITA: No mass or lymphadenopathy. THORACIC AORTA: No aneurysm. MAIN PULMONARY ARTERY: Normal in caliber. TRACHEA/BRONCHI: Patent. ESOPHAGUS: No wall thickening or dilatation. HEART: Coronary atherosclerotic disease. Normal heart size. PLEURA: No effusion or pneumothorax. LUNGS: No nodule, mass, or airspace disease. INCIDENTALLY IMAGED UPPER ABDOMEN: No significant abnormality in the  incidentally imaged upper abdomen. BONES: Multilevel degenerative changes in the thoracic spine. IMPRESSION  No acute cardiopulmonary process. Coronary atherosclerotic disease. NM perfusion scan read by radiology results:  Indication: Shortness of breath. A perfusion scan was performed with 4 mCi Tc MAA injected intravenously. Perfusion images demonstrates no segmental abnormalities. IMPRESSION  1. Normal perfusion scan. Bilateral LE dopplers read by radiology results:  Right Lower Venous  No evidence of deep vein thrombosis in the right lower extremity. The common femoral, great saphenous, great saphenous thigh, femoral, proximal femoral, mid femoral, distal femoral, popliteal, posterior tibial and saphenous femoral junction vein(s) were imaged in the transverse and longitudinal planes. The vessels showed normal color filling and compressibility. Doppler interrogation of the veins showed phasic and spontaneous flow. The peroneal vein(s) were not well visualized. Left Lower Venous  No evidence of deep vein thrombosis in the left lower extremity. The common femoral, great saphenous, saphenous femoral junction, greater saphenous thigh, femoral, proximal femoral, mid femoral, distal femoral, popliteal and posterior tibial vein(s) were imaged in the transverse and longitudinal planes. The vessels showed normal color filling and compressibility. Doppler interrogation of the veins showed phasic and spontaneous flow. The peroneal vein(s) were not well visualized. Abdominal US read by radiology FINDINGS:  LIVER:   The liver is heterogeneous  with no mass or other focal abnormality. The liver  appears mildly nodular. LIVER VASCULATURE:   The portal vein flow is hepatopedal.  GALLBLADDER:  The gallbladder is normal and no stones are identified. There is no wall  thickening or fluid around the gallbladder. COMMON BILE DUCT:  There is no biliary duct dilatation and the common duct measures 3.7 mm in  diameter. PANCREAS:  The visualized pancreas is normal.   SPLEEN:  The spleen is normal in echotexture and size and measures 9.9 cm in length. RIGHT KIDNEY:  The right kidney demonstrates normal echogenicity with no mass, stone or  hydronephrosis. The right kidney measures 8.3 cm in length. LEFT KIDNEY:  The left kidney demonstrates normal echogenicity with no mass, stone or  hydronephrosis. The left kidney measures 7.9 cm in length. RETROPERITONEUM:  The aorta tapers normally. The IVC is normal.  IMPRESSION  1. Heterogeneous nodular liver concerning for cirrhosis. 2. Small kidneys.     Echo TTE read by cardiology  Left Ventricle Normal cavity size and wall thickness. The estimated EF is 45 - 50%. Mildly reduced systolic function. There is mild (grade 1) left ventricular diastolic dysfunction left ventricular diastolic dysfunction.    Left Atrium Normal cavity size. Right Ventricle Normal cavity size and global systolic function. Right Atrium Normal cavity size. Aortic Valve Normal valve structure, no stenosis and no regurgitation. Aortic valve sclerosis. Mitral Valve Normal valve structure, no stenosis and no regurgitation. Mitral valve thickening. There is anterior leaflet thickening. Mitral annular calcification. Tricuspid Valve Normal valve structure and no stenosis. Trace regurgitation. Right Ventricular Arterial Pressure (RVSP) is 24 mmHg. Pulmonary hypertension not suggested by Doppler findings. Pulmonic Valve Normal valve structure, no stenosis and no regurgitation. Aorta Dilated ascending aorta. Pulmonary Artery Normal pulmonary arteries. IVC/Hepatic Veins Normal structure. Pericardium No evidence of pericardial effusion. Hospital course:       Arterial hypotension 71/36 POA required neosynephrine  Suspected hypovolemia POA with hypotension and MARIELENA  Baseline essential HTN POA   Bilateral foot pain ?  Neuropathy POA  Initially remained hypotensive, still requiring low dose neosynephrine  Acute kidney injury at admit BUN/creat 71 and 2.38  Ultimately suspect she was hypovolemic  Received IVF  Lasix and lisinopril were held  No MI, serial troponins negative  No evidence of VTE        NM V/Q scan normal        LE dopplers negative for DVT bilaterally  No clear sepsis        UA with 0-4 WBC, 0 to 5 RBC, no bacteria        CXR No ASD or edema        CT chest with no ASD or edema        Increased alk phos                US with mildly nodular liver, no mass                              Normal GB without stones, CBD 3.7 mm                              Normal spleen size                Testing suggested bone source for the elevated alk phos        Normal lactate 1.0        Blood culture remains negative        Procalcitonin 0.55  Echo LVEF 45-50%, normal RV size and function, no AS, no MR  Serial HgBs stable, no clear bleeding  Cosyntropin ACTH stim test baseline 24.7, then 23.2 and 32.4 after ACTH  Hypotension resolved with IVF and midodrine, pressors weaned off  Off BP meds, will hold till PCP follow up        Non anion gap, hyperchloremic acidosis due to IVF  Cardiology followed  Midodrine weaned off before discharge  Prednisone x 5 days with foot pain, ? Gout  Pt declined home health, will do outpatient PT, I gave her a script     Acute kidney injury POA admit BUN/creat 71 and 2.38  Chronic kidney disease stage III POA baseline creat 1.15, GFR 48  - Stop lisinopril  Gentle IV hydration, creatinine improving to 1.09   Renal US without hydronephrosis  - Dr Valery Edwards followed  - suspected hypovolemia     Fibromyalgia  Continue Lyrica     Morbid obesity POA Body mass index is 44.13 kg/m². Code Status: Full code  Surrogate Decision Maker: Son information on record     DVT Prophylaxis: Sub-q heparin  GI Prophylaxis: not indicated     Baseline: Independent       Subjective:     Chief Complaint / Reason for Physician Visit f/u hypotension  \"my legs are sore\". Discussed with RN events overnight. Legs feel swollen and sore, neetu her feet  Weaned off pressors with IVF  Feels ready to go home    Review of Systems:  Symptom Y/N Comments  Symptom Y/N Comments   Fever/Chills n   Chest Pain n    Poor Appetite    Edema     Cough n   Abdominal Pain n    Sputum    Joint Pain     SOB/GATICA n   Headache n    Nausea/vomit n   Tolerating PT/OT     Diarrhea n   Tolerating Diet y    Constipation    Other       Could NOT obtain due to:      Objective:     VITALS:   Last 24hrs VS reviewed since prior progress note.  Most recent are:  Patient Vitals for the past 24 hrs:   Temp Pulse Resp BP SpO2   03/26/21 1416  (!) 52  (!) 131/100 100 %   03/26/21 1047 97 °F (36.1 °C) 96 18 138/61 100 %   03/26/21 0737 98 °F (36.7 °C) (!) 57 16 139/60 100 %   03/26/21 0400 97.8 °F (36.6 °C) (!) 41 16 (!) 132/54 99 %   03/26/21 0000  (!) 58  (!) 132/56 98 % 03/25/21 2247  (!) 59  131/64 97 %   03/25/21 2246 98 °F (36.7 °C) (!) 58 14 131/64 99 %   03/25/21 2200  (!) 56  (!) 113/57 98 %   03/25/21 2102  60  138/74 100 %   03/25/21 2030  (!) 50  (!) 126/53 100 %   03/25/21 2000 97.9 °F (36.6 °C) (!) 58 17 130/62 100 %   [birth year]/1947  (!) 56  111/86 99 %   03/25/21 1945  60  121/78 100 %   03/25/21 1930  62  (!) 119/58 100 %   03/25/21 1915  64  122/86 100 %   03/25/21 1900  (!) 53  (!) 110/50 100 %   03/25/21 1845  (!) 47  123/63 100 %   03/25/21 1600  (!) 50  (!) 131/52 100 %   03/25/21 1541 98.3 °F (36.8 °C) 60  112/65 100 %       Intake/Output Summary (Last 24 hours) at 3/26/2021 1525  Last data filed at 3/26/2021 1047  Gross per 24 hour   Intake 2233.75 ml   Output    Net 2233.75 ml        Wt Readings from Last 12 Encounters:   03/23/21 102.5 kg (225 lb 15.5 oz)   09/22/20 105.7 kg (233 lb)   03/10/20 103 kg (227 lb)   01/29/20 106.5 kg (234 lb 12.8 oz)   12/16/19 107.2 kg (236 lb 6.4 oz)   11/12/19 106.1 kg (234 lb)   08/19/19 107.5 kg (237 lb 1.6 oz)   08/13/19 107.5 kg (237 lb)   05/06/19 106.1 kg (234 lb)   04/11/19 108.4 kg (239 lb)   04/08/19 107.9 kg (237 lb 12.8 oz)   10/18/18 106.1 kg (234 lb)       PHYSICAL EXAM:    I had a face to face encounter and independently examined this patient on 03/26/21 as outlined below:    General: WD, WN. Alert, cooperative, no acute distress    EENT:  PERRL. Anicteric sclerae. MMM  Resp:  CTA bilaterally, no wheezing or rales. No accessory muscle use  CV:  Regular  rhythm,  No edema  GI:  Soft, Non distended, Non tender. +Bowel sounds, no rebound  Neurologic:  Alert and oriented X 3, normal speech, non focal motor exam, no pronator drift  Psych:   Good insight. Not anxious nor agitated  Skin:  No rashes.   No jaundice    Reviewed most current lab test results and cultures  YES  Reviewed most current radiology test results   YES  Review and summation of old records today    NO  Reviewed patient's current orders and MAR    YES  PMH/SH reviewed - no change compared to H&P  ________________________________________________________________________  Care Plan discussed with:    Comments   Patient x    Family      RN x    Care Manager     Consultant                        Multidiciplinary team rounds were held today with , nursing, pharmacist and clinical coordinator. Patient's plan of care was discussed; medications were reviewed and discharge planning was addressed. ________________________________________________________________________      Comments   >50% of visit spent in counseling and coordination of care     ________________________________________________________________________  Page MD Ann     Procedures: see electronic medical records for all procedures/Xrays and details which were not copied into this note but were reviewed prior to creation of Plan. LABS:  I reviewed today's most current labs and imaging studies.   Pertinent labs include:  Recent Labs     03/26/21 0333 03/25/21 0244 03/24/21 0411   WBC 4.3 5.0 5.3   HGB 9.0* 9.7* 9.2*   HCT 28.9* 30.8* 28.7*    214 200     Recent Labs     03/26/21 0333 03/25/21 0244 03/24/21 0411    136 133*   K 5.1 5.0 4.6   * 111* 105   CO2 17* 17* 17*   * 85 85   BUN 41* 59* 69*   CREA 1.09* 1.52* 2.27*   CA 8.6 8.7 8.9   MG  --  2.2 2.2   ALB 3.0* 3.2* 3.4*   TBILI 0.4 0.5 0.5   ALT 51 57 62

## 2021-04-06 ENCOUNTER — PATIENT OUTREACH (OUTPATIENT)
Dept: CASE MANAGEMENT | Age: 76
End: 2021-04-06

## 2021-04-06 NOTE — PROGRESS NOTES
04/06/21  Unable to reach patient for [facility] call.  left for patient. Episode resolved at this time due to inability to reach patient.  AR

## 2021-05-25 ENCOUNTER — APPOINTMENT (OUTPATIENT)
Dept: GENERAL RADIOLOGY | Age: 76
End: 2021-05-25
Attending: PHYSICAL MEDICINE & REHABILITATION
Payer: MEDICARE

## 2021-05-25 ENCOUNTER — HOSPITAL ENCOUNTER (OUTPATIENT)
Age: 76
Setting detail: OUTPATIENT SURGERY
Discharge: HOME | End: 2021-05-25
Attending: PHYSICAL MEDICINE & REHABILITATION | Admitting: PHYSICAL MEDICINE & REHABILITATION
Payer: MEDICARE

## 2021-05-25 VITALS
RESPIRATION RATE: 18 BRPM | TEMPERATURE: 97.9 F | HEIGHT: 60 IN | HEART RATE: 84 BPM | BODY MASS INDEX: 44.76 KG/M2 | OXYGEN SATURATION: 97 % | SYSTOLIC BLOOD PRESSURE: 142 MMHG | WEIGHT: 228 LBS | DIASTOLIC BLOOD PRESSURE: 60 MMHG

## 2021-05-25 PROCEDURE — 76210000046 HC AMBSU PH II REC FIRST 0.5 HR: Performed by: PHYSICAL MEDICINE & REHABILITATION

## 2021-05-25 PROCEDURE — 2709999900 HC NON-CHARGEABLE SUPPLY: Performed by: PHYSICAL MEDICINE & REHABILITATION

## 2021-05-25 PROCEDURE — 76000 FLUOROSCOPY <1 HR PHYS/QHP: CPT

## 2021-05-25 PROCEDURE — 76030000002 HC AMB SURG OR TIME FIRST 0.: Performed by: PHYSICAL MEDICINE & REHABILITATION

## 2021-05-25 PROCEDURE — 74011250636 HC RX REV CODE- 250/636: Performed by: PHYSICAL MEDICINE & REHABILITATION

## 2021-05-25 PROCEDURE — 77030003665 HC NDL SPN BBMI -A: Performed by: PHYSICAL MEDICINE & REHABILITATION

## 2021-05-25 PROCEDURE — 74011000250 HC RX REV CODE- 250: Performed by: PHYSICAL MEDICINE & REHABILITATION

## 2021-05-25 RX ORDER — DIPHENHYDRAMINE HCL 25 MG
12.5 CAPSULE ORAL
COMMUNITY
End: 2022-05-18

## 2021-05-25 RX ORDER — METHYLPREDNISOLONE ACETATE 40 MG/ML
80 INJECTION, SUSPENSION INTRA-ARTICULAR; INTRALESIONAL; INTRAMUSCULAR; SOFT TISSUE ONCE
Status: COMPLETED | OUTPATIENT
Start: 2021-05-25 | End: 2021-05-25

## 2021-05-25 RX ORDER — LIDOCAINE HYDROCHLORIDE 20 MG/ML
10 INJECTION, SOLUTION INFILTRATION; PERINEURAL ONCE
Status: COMPLETED | OUTPATIENT
Start: 2021-05-25 | End: 2021-05-25

## 2021-05-25 RX ORDER — DEXTROMETHORPHAN HYDROBROMIDE, GUAIFENESIN 5; 100 MG/5ML; MG/5ML
650 LIQUID ORAL EVERY 8 HOURS
COMMUNITY

## 2021-05-25 RX ORDER — FUROSEMIDE 20 MG/1
20 TABLET ORAL
COMMUNITY

## 2021-05-25 RX ORDER — BUPIVACAINE HYDROCHLORIDE 5 MG/ML
10 INJECTION, SOLUTION EPIDURAL; INTRACAUDAL ONCE
Status: COMPLETED | OUTPATIENT
Start: 2021-05-25 | End: 2021-05-25

## 2021-05-25 RX ORDER — AMLODIPINE BESYLATE 5 MG/1
TABLET ORAL
COMMUNITY
Start: 2021-04-28 | End: 2021-08-10

## 2021-05-25 NOTE — DISCHARGE INSTRUCTIONS
Discharge Instructions  Lumbar Facet Block/ Medial Branch Block  You had a lumbar facet injection today. You will probably have some numbness in your low back area for the next 6 to 8 hours. The steroids will slowly become effective, reducing your pain, over the next 2 weeks. You should begin feeling better after a few days, but it may take up to 2 weeks to notice the difference. The benefit you get from your injection will last a variable amount of time, depending on the severity of your lumbar spine problem. If the results you experience are significant, but not long lasting, you may be a candidate for a procedure that can be longer lasting (radiofrequency ablation of the nerves innervating the facet joints).  Pain:  Most people do not have any increase in pain after this injection. However, you might experience some soreness at the site of the injection. If this happens, putting an ice pack over the sore area will help.  Bandage: You have a small bandage covering the site of the injection. You may remove it once you get home.  Restrictions:  Someone should drive you home after the injection. After that, you have no restrictions. You may resume your normal level of activity. You may take a shower or bath, and you may eat normally. You should continue your current exercises and/or therapy routine.  Medications:  Continue your current medications as prescribed. If your pain decreases, you may reduce the amount of your pain medicines. If you stopped taking anticoagulants or blood-thinners before the injection, start them tomorrow. If you have diabetes, your blood sugar may be elevated for a few days. Call your primary doctor with any questions.   Call Dr. Irene Garner at [phone] if you experience:   Fever (101 degrees Fahrenheit or greater)   Nausea or vomiting   Headache unrelieved by your normal pain medicine   Redness or swelling at the injection site that lasts more than 1 day   New numbness, tingling, weakness, or pain that you didnt have before the injection. If still having pain in 1-2 weeks, call office at [phone] for a follow up appointment. DISCHARGE SUMMARY from Nurse    The following personal items collected during your admission are returned to you:   Dental Appliance: Dental Appliances: None  Vision: Visual Aid: Glasses  Hearing Aid:    Jewelry: Jewelry: None  Clothing: Clothing: With patient  Other Valuables: Other Valuables: Autumn Bennett, Cell Phone, With patient  Valuables sent to safe: If you were given prescriptions, please review the written information on prescribed medications. · You will receive a Post Operative Call from one of the Recovery Room Nurses on the day after your surgery to check on you. It is very important for us to know how you are recovering after your surgery. · You may receive an e-mail or letter in the mail from [facility] regarding your experience with us in the Ambulatory Surgery Unit. Your feedback is valuable to us and we appreciate your participation in the survey. If you have not had your influenza or pneumococcal vaccines, please follow up with your primary care physician. The discharge information has been reviewed with the patient. The patient verbalized understanding.

## 2021-05-25 NOTE — PERIOP NOTES
Neuro:  Push/Pull assessment:     LUE Response: Equal/strong    LLE Response: equal push/pull   RUE Response: equal/strong    RLE Response: equal push/pull

## 2021-05-25 NOTE — OP NOTES
Facet Steroid Injection Operative Report    Indications: This is a 70 y.o. female who presents with LBP. She was positive for LS DJD. The patient was admitted for surgery as conservative measures have failed. Date of Surgery: 5/25/2021    Preoperative Diagnosis: LS DJD    Postoperative Diagnosis: LS DJD    Surgeon(s) and Role:     * Chelsea Santiago MD - Primary     Procedure:  Procedure(s):  BILATERAL L4-L5, L5-S1 FACET INJECTION    Procedure in Detail:  After appropriate informed consent was obtained, the patient was taken to the operating suite and placed in the prone position on the operating table on appropriate padding. The LS region was prepped and draped in the usual sterile fashion. Intraoperative fluoroscopy was used to localize the LS spine. The skin was infiltrated with 2% lidocaine. An 25-g needle was advanced into the Jose L4-5 and L5-S1 facets under fluoroscopic guidance. Next, 2ml of 0.5% marcaine and 80mg of Depo-Medrol were injected. The needle was removed from the patient. The patient was then turned back into the supine position on the stretcher and was taken to the Recovery Room in stable condition.     Estimated Blood Loss:  none     Specimens: None       Drains: None          Complications:  None    Signed By: Jonn Dubon MD                        May 25, 2021

## 2021-05-25 NOTE — PERIOP NOTES
Figueroa Cullen  1945  [id number]    Situation:  Verbal report given from: JAMAAL Buenrostro RN  Procedure: Procedure(s):  BILATERAL L4-L5, L5-S1 FACET INJECTION    Background:    Preoperative diagnosis: Lumbar Spondylosis    Postoperative diagnosis: Lumbar Spondylosis    :  Dr. Sydni Zimmer:  Intra-procedure medications, procedure, and allergies reviewed        Recommendation:    Discharge patient home after discharge instructions reviewed with patient. Rest until local has worn off.

## 2021-05-25 NOTE — PERIOP NOTES
Pt with strong bilateral dorsi and plantar flexion. Injection site without drainage. ambulate to wheelchair without c/o pain, denies weakness. Pt discharged via wheelchair, accompanied by RN. Pt discharged awake and alert, respirations equal and unlabored, skin warm, dry, and intact. Pt and family members' questions and concerns addressed prior to discharge.

## 2021-05-25 NOTE — PERIOP NOTES
[facility]  Ambulatory Surgery Unit  Pre-operative Instructions    Procedure Date  Tuesday, May 25, 2021            Tentative Arrival Time 1230      1. On the day of your procedure, please report to the Ambulatory Surgery Unit Registration Desk and sign in at your designated time. The Ambulatory Surgery Unit is located in [facility] on the [facility] side of the [facility] across from the [address] [place] [address]. Please have all of your health insurance cards and a photo ID. 2. You must have someone with you to drive you home as directed by your surgeon. 3. You may have a light breakfast and take normal morning medications. 4. We recommend you do not drink any alcoholic beverages for 24 hours before and after your procedure. 5. Contact your surgeons office for instructions on the following medications: non-steroidal anti-inflammatory drugs (i.e. Advil, Aleve), vitamins, and supplements. (Some surgeons will want you to stop these medications prior to surgery and others may allow you to take them)   **If you are currently taking Plavix, Coumadin, Aspirin and/or other blood-thinning agents, contact your surgeon for instructions. ** Your surgeon will partner with the physician prescribing these medications to determine if it is safe to stop or if you need to continue taking. Please do not stop taking these medications without instructions from your surgeon. 6. In an effort to help prevent surgical site infection, we ask that you shower with an anti-bacterial soap (i.e. Dial or Safeguard) on the morning of your procedure. Do not apply any lotions, powders, or deodorants after showering. 7. Wear comfortable clothes. Wear glasses instead of contacts. Do not bring any jewelry or money (other than copays or fees as instructed). Do not wear make-up, particularly mascara, the morning of your procedure. Wear your hair loose or down, no ponytails, buns, chelly pins or clips.  All body piercings must be removed. 8. You should understand that if you do not follow these instructions your procedure may be cancelled. If your physical condition changes (i.e. fever, cold or flu) please contact your surgeon as soon as possible. 9. It is important that you be on time. If a situation occurs where you may be late, or if you have any questions or problems, please call [phone].    10. Your procedure time may be subject to change. You will receive a phone call the day prior to confirm your arrival time. I understand a pre-operative phone call will be made to verify my procedure time. In the event that I am not available, I give permission for a message to be left on my answering service and/or with another person?       yes    Preop instructions reviewed  Pt verbalized understanding.      ___________________      ___________________      ___________________  (Signature of Patient)          (Witness)                   (Date and Time)

## 2021-05-25 NOTE — H&P
Procedural Case Note    5/25/2021    (1:08 PM)    Terrence Perez    1945   (70 y.o.)    [id number]    CC:  pain    ROS:   Complete ROS obtained, no CP, no SOB, no N or V    PMH:     Past Medical History:   Diagnosis Date    Adverse effect of anesthesia     combative with anesthesia    Arthritis     Cancer ([facility] [address]     tumor on nose, removed, pt not sure what type    Fibromyalgia     Hypertension     Sinus bradycardia     [place]    Stage 3 chronic kidney disease ([facility] [address]     sees Peña       ALLERGIES:   No Known Allergies    MEDS:     No current facility-administered medications for this encounter. Visit Vitals  BP (!) 149/82 (BP 1 Location: Right upper arm, BP Patient Position: At rest)   Pulse 94   Temp 98.2 °F (36.8 °C)   Resp 17   Ht 5' (1.524 m)   Wt 103.4 kg (228 lb)   SpO2 100%   BMI 44.53 kg/m²     PE:  Gen: NAD  Head: normocephalic  Heart: RRR  Lungs: CTA raymond  Abd: NT, ND, soft  Neuro: awake and alert  Skin: intact    IMPRESSION:   LS DJD    Note:  The clinical status was discussed in detail with the patient. The procedure was again discussed and described in detail. All understand and accept the planned procedure and risks; reject other forms of treatment. All questions are answered.     Toño Aldrich MD

## 2021-07-28 ENCOUNTER — HOSPITAL ENCOUNTER (EMERGENCY)
Age: 76
Discharge: HOME | End: 2021-07-28
Attending: EMERGENCY MEDICINE
Payer: MEDICARE

## 2021-07-28 VITALS
RESPIRATION RATE: 15 BRPM | DIASTOLIC BLOOD PRESSURE: 68 MMHG | BODY MASS INDEX: 45.14 KG/M2 | SYSTOLIC BLOOD PRESSURE: 118 MMHG | HEART RATE: 68 BPM | TEMPERATURE: 97.8 F | OXYGEN SATURATION: 100 % | WEIGHT: 229.94 LBS | HEIGHT: 60 IN

## 2021-07-28 DIAGNOSIS — N18.31 STAGE 3A CHRONIC KIDNEY DISEASE (HCC): ICD-10-CM

## 2021-07-28 DIAGNOSIS — E87.5 ACUTE HYPERKALEMIA: ICD-10-CM

## 2021-07-28 DIAGNOSIS — E87.1 HYPONATREMIA: Primary | ICD-10-CM

## 2021-07-28 LAB
ALBUMIN SERPL-MCNC: 3.8 G/DL (ref 3.5–5)
ALBUMIN/GLOB SERPL: 1.2 {RATIO} (ref 1.1–2.2)
ALP SERPL-CCNC: 128 U/L (ref 45–117)
ALT SERPL-CCNC: 20 U/L (ref 12–78)
ANION GAP SERPL CALC-SCNC: 7 MMOL/L (ref 5–15)
APPEARANCE UR: CLEAR
AST SERPL-CCNC: 18 U/L (ref 15–37)
ATRIAL RATE: 62 BPM
BACTERIA URNS QL MICRO: NEGATIVE /HPF
BASOPHILS # BLD: 0.1 K/UL (ref 0–0.1)
BASOPHILS NFR BLD: 1 % (ref 0–1)
BILIRUB SERPL-MCNC: 0.4 MG/DL (ref 0.2–1)
BILIRUB UR QL: NEGATIVE
BUN SERPL-MCNC: 35 MG/DL (ref 6–20)
BUN/CREAT SERPL: 28 (ref 12–20)
CALCIUM SERPL-MCNC: 8.8 MG/DL (ref 8.5–10.1)
CALCULATED P AXIS, ECG09: 54 DEGREES
CALCULATED R AXIS, ECG10: -58 DEGREES
CALCULATED T AXIS, ECG11: 45 DEGREES
CHLORIDE SERPL-SCNC: 101 MMOL/L (ref 97–108)
CO2 SERPL-SCNC: 22 MMOL/L (ref 21–32)
COLOR UR: ABNORMAL
CREAT SERPL-MCNC: 1.25 MG/DL (ref 0.55–1.02)
DIAGNOSIS, 93000: NORMAL
DIFFERENTIAL METHOD BLD: ABNORMAL
EOSINOPHIL # BLD: 0.7 K/UL (ref 0–0.4)
EOSINOPHIL NFR BLD: 8 % (ref 0–7)
EPITH CASTS URNS QL MICRO: ABNORMAL /LPF
ERYTHROCYTE [DISTWIDTH] IN BLOOD BY AUTOMATED COUNT: 13.1 % (ref 11.5–14.5)
GLOBULIN SER CALC-MCNC: 3.2 G/DL (ref 2–4)
GLUCOSE SERPL-MCNC: 84 MG/DL (ref 65–100)
GLUCOSE UR STRIP.AUTO-MCNC: NEGATIVE MG/DL
HCT VFR BLD AUTO: 36.8 % (ref 35–47)
HGB BLD-MCNC: 11.8 G/DL (ref 11.5–16)
HGB UR QL STRIP: NEGATIVE
HYALINE CASTS URNS QL MICRO: ABNORMAL /LPF (ref 0–5)
IMM GRANULOCYTES # BLD AUTO: 0 K/UL (ref 0–0.04)
IMM GRANULOCYTES NFR BLD AUTO: 0 % (ref 0–0.5)
KETONES UR QL STRIP.AUTO: NEGATIVE MG/DL
LEUKOCYTE ESTERASE UR QL STRIP.AUTO: ABNORMAL
LYMPHOCYTES # BLD: 1.9 K/UL (ref 0.8–3.5)
LYMPHOCYTES NFR BLD: 21 % (ref 12–49)
MCH RBC QN AUTO: 29.6 PG (ref 26–34)
MCHC RBC AUTO-ENTMCNC: 32.1 G/DL (ref 30–36.5)
MCV RBC AUTO: 92.2 FL (ref 80–99)
MONOCYTES # BLD: 0.5 K/UL (ref 0–1)
MONOCYTES NFR BLD: 5 % (ref 5–13)
NEUTS SEG # BLD: 5.8 K/UL (ref 1.8–8)
NEUTS SEG NFR BLD: 65 % (ref 32–75)
NITRITE UR QL STRIP.AUTO: NEGATIVE
NRBC # BLD: 0 K/UL (ref 0–0.01)
NRBC BLD-RTO: 0 PER 100 WBC
P-R INTERVAL, ECG05: 206 MS
PH UR STRIP: 5.5 [PH] (ref 5–8)
PLATELET # BLD AUTO: 269 K/UL (ref 150–400)
PMV BLD AUTO: 10.4 FL (ref 8.9–12.9)
POTASSIUM SERPL-SCNC: 5.6 MMOL/L (ref 3.5–5.1)
PROT SERPL-MCNC: 7 G/DL (ref 6.4–8.2)
PROT UR STRIP-MCNC: NEGATIVE MG/DL
Q-T INTERVAL, ECG07: 420 MS
QRS DURATION, ECG06: 132 MS
QTC CALCULATION (BEZET), ECG08: 426 MS
RBC # BLD AUTO: 3.99 M/UL (ref 3.8–5.2)
RBC #/AREA URNS HPF: ABNORMAL /HPF (ref 0–5)
SODIUM SERPL-SCNC: 130 MMOL/L (ref 136–145)
SP GR UR REFRACTOMETRY: 1.01 (ref 1–1.03)
UA: UC IF INDICATED,UAUC: ABNORMAL
UROBILINOGEN UR QL STRIP.AUTO: 0.2 EU/DL (ref 0.2–1)
VENTRICULAR RATE, ECG03: 62 BPM
WBC # BLD AUTO: 8.9 K/UL (ref 3.6–11)
WBC URNS QL MICRO: ABNORMAL /HPF (ref 0–4)

## 2021-07-28 PROCEDURE — 87086 URINE CULTURE/COLONY COUNT: CPT

## 2021-07-28 PROCEDURE — 74011250637 HC RX REV CODE- 250/637: Performed by: STUDENT IN AN ORGANIZED HEALTH CARE EDUCATION/TRAINING PROGRAM

## 2021-07-28 PROCEDURE — 87077 CULTURE AEROBIC IDENTIFY: CPT

## 2021-07-28 PROCEDURE — 99284 EMERGENCY DEPT VISIT MOD MDM: CPT

## 2021-07-28 PROCEDURE — 81001 URINALYSIS AUTO W/SCOPE: CPT

## 2021-07-28 PROCEDURE — 85025 COMPLETE CBC W/AUTO DIFF WBC: CPT

## 2021-07-28 PROCEDURE — 93005 ELECTROCARDIOGRAM TRACING: CPT

## 2021-07-28 PROCEDURE — 80053 COMPREHEN METABOLIC PANEL: CPT

## 2021-07-28 PROCEDURE — 36415 COLL VENOUS BLD VENIPUNCTURE: CPT

## 2021-07-28 PROCEDURE — 87186 SC STD MICRODIL/AGAR DIL: CPT

## 2021-07-28 RX ORDER — SODIUM POLYSTYRENE SULFONATE 15 G/60ML
15 SUSPENSION ORAL; RECTAL
Status: COMPLETED | OUTPATIENT
Start: 2021-07-28 | End: 2021-07-28

## 2021-07-28 RX ADMIN — SODIUM POLYSTYRENE SULFONATE 15 G: 15 SUSPENSION ORAL; RECTAL at 16:10

## 2021-07-28 NOTE — ED NOTES
PIV from left [facility] removed with cath tip intact. Patient (s)  given copy of dc instructions. Patient (s)  verbalized understanding of instructions. Patient given a current medication reconciliation form and verbalized understanding of their medications. Patient (s) verbalized understanding of the importance of discussing medications with  his or her physician or clinic they will be following up with. Patient alert and oriented and in no acute distress. Patient discharged home ambulatory with all belongings.

## 2021-07-28 NOTE — DISCHARGE INSTRUCTIONS
It was a pleasure taking care of you at [facility] today. We know that when you come to [facility], you are entrusting us with your health, comfort, and safety. Our physicians and nurses honor that trust, and we truly appreciate the opportunity to care for you and your loved ones. We also value your feedback. If you receive a survey about your Emergency Department experience today, please fill it out. We care about our patients' feedback, and we listen to what you have to say. Thank you!     Kristen Benoit MD

## 2021-07-28 NOTE — ED PROVIDER NOTES
EMERGENCY DEPARTMENT HISTORY AND PHYSICAL EXAM      Date: 7/28/2021  Patient Name: Demetris Chester    History of Presenting Illness     Chief Complaint   Patient presents with   [facility]     pt reports she went to her PCP for follow up from hospital stay, was told to come here because her K+ was high and her Na was low. denies n/v and problems with urination       History Provided By: Patient    HPI: Demetris Chester, 70 y.o. female with past medical history of CKD, CHF, presents to the ED with cc of abnormal labs- sent from PCP's office. Per patient, he was recently discharged from outside hospital after stay for MARIELENA on CKD. States that she had follow-up labs completed by her PCP, and was subsequently told by her PCP that she had dangerously low sodium and dangerously high potassium levels. PCP told her to present immediately to the ED for further evaluation. Patient denies having any symptoms. States that she was surprised when her PCP told her about this. States that she is overall feeling very well. She reports drinking about three bottles of water a day, which has not really changed. She admits that she occasionally does drink a bottle of Coke/Pepsi, but has been trying to be good about staying away from sodas in general.  States that she has been trying to avoid sodium in her diet as she was told to do this by her heart doctor. She states that she does have a nephrology appointment already scheduled, but it is not for a few months. She denies any nausea, vomiting, diarrhea. Denies any CHF symptoms. Denies any palpitations. PCP: Rigoberto Alvarez MD    No current facility-administered medications on file prior to encounter. Current Outpatient Medications on File Prior to Encounter   Medication Sig Dispense Refill    acetaminophen (TYLENOL) 650 mg TbER Take 650 mg by mouth every eight (8) hours.  naproxen sodium (ALEVE PO) Take 600 mg by mouth two (2) times a day.       diphenhydrAMINE (BenadryL) 25 mg capsule Take 12.5 mg by mouth nightly as needed.  amLODIPine (NORVASC) 5 mg tablet TAKE 1 TABLET BY MOUTH ONCE DAILY      furosemide (Lasix) 20 mg tablet Take 20 mg by mouth as needed.  traMADoL (ULTRAM) 50 mg tablet TAKE 1 TABLET BY MOUTH EVERY 6 HOURS AS NEEDED FOR PAIN UP TO  30 DAYS . DO NOT EXCEED 4 PER 24 HOURS 30 Tab 0    pregabalin (Lyrica) 25 mg capsule Take 25 mg by mouth as needed.  fluticasone propionate (Flonase Allergy Relief) 50 mcg/actuation nasal spray 2 Sprays by Both Nostrils route daily. Past History     Past Medical History:  Past Medical History:   Diagnosis Date    Adverse effect of anesthesia     combative with anesthesia    Arthritis     Cancer ([facility] [address]     tumor on nose, removed, pt not sure what type    Fibromyalgia     Hypertension     Sinus bradycardia     [place]    Stage 3 chronic kidney disease ([facility] [address]     sees Angelique Hinojosa       Past Surgical History:  Past Surgical History:   Procedure Laterality Date    HX COLONOSCOPY      HX DILATION AND CURETTAGE      HX TONSILLECTOMY      as a child    IR INJ SPINE FLUORO GUIDED  2/22/2021       Family History:  Family History   Problem Relation Age of Onset    Diabetes Mother     Heart Disease Mother     Hypertension Mother     Hypertension Father     Heart Disease Father        Social History:  Social History     Tobacco Use    Smoking status: Never Smoker    Smokeless tobacco: Never Used   Substance Use Topics    Alcohol use: No    Drug use: Never       Allergies:  No Known Allergies      Review of Systems   Review of Systems   Constitutional: Negative for chills and fever. HENT: Negative for congestion and rhinorrhea. Eyes: Negative for visual disturbance. Respiratory: Negative for chest tightness and shortness of breath. Cardiovascular: Negative for chest pain, palpitations and leg swelling. Gastrointestinal: Negative for abdominal pain, diarrhea, nausea and vomiting. Genitourinary: Negative for dysuria, flank pain and hematuria. Musculoskeletal: Negative for back pain and neck pain. Skin: Negative for rash. Allergic/Immunologic: Negative for immunocompromised state. Neurological: Negative for dizziness, speech difficulty, weakness and headaches. Hematological: Negative for adenopathy. Psychiatric/Behavioral: Negative for dysphoric mood and suicidal ideas. Physical Exam   Physical Exam  Vitals and nursing note reviewed. Constitutional:       General: She is not in acute distress. Appearance: She is not ill-appearing or toxic-appearing. HENT:      Head: Normocephalic and atraumatic. Nose: Nose normal.      Mouth/Throat:      Mouth: Mucous membranes are moist.   Eyes:      Extraocular Movements: Extraocular movements intact. Pupils: Pupils are equal, round, and reactive to light. Cardiovascular:      Rate and Rhythm: Normal rate and regular rhythm. Pulses: Normal pulses. Pulmonary:      Effort: Pulmonary effort is normal.      Breath sounds: No stridor. No wheezing or rhonchi. Abdominal:      General: Abdomen is flat. There is no distension. Tenderness: There is no abdominal tenderness. Musculoskeletal:         General: Normal range of motion. Cervical back: Normal range of motion and neck supple. Skin:     General: Skin is warm and dry. Neurological:      General: No focal deficit present. Mental Status: She is alert and oriented to person, place, and time.    Psychiatric:         Judgment: Judgment normal.         Diagnostic Study Results     Labs -     Recent Results (from the past 24 hour(s))   EKG, 12 LEAD, INITIAL    Collection Time: 07/28/21 12:58 PM   Result Value Ref Range    Ventricular Rate 62 BPM    Atrial Rate 62 BPM    P-R Interval 206 ms    QRS Duration 132 ms    Q-T Interval 420 ms    QTC Calculation (Bezet) 426 ms    Calculated P Axis 54 degrees    Calculated R Axis -58 degrees    Calculated T Axis 45 degrees    Diagnosis       Normal sinus rhythm  Left axis deviation  Left bundle branch block  When compared with ECG of 23-MAR-2021 15:51,  Left bundle branch block is now present  Criteria for Anterior infarct are no longer present  Criteria for Anterolateral infarct are no longer present  Criteria for Inferior infarct are no longer present     CBC WITH AUTOMATED DIFF    Collection Time: 07/28/21  1:25 PM   Result Value Ref Range    WBC 8.9 3.6 - 11.0 K/uL    RBC 3.99 3.80 - 5.20 M/uL    HGB 11.8 11.5 - 16.0 g/dL    HCT 36.8 35.0 - 47.0 %    MCV 92.2 80.0 - 99.0 FL    MCH 29.6 26.0 - 34.0 PG    MCHC 32.1 30.0 - 36.5 g/dL    RDW 13.1 11.5 - 14.5 %    PLATELET 384 235 - 906 K/uL    MPV 10.4 8.9 - 12.9 FL    NRBC 0.0 0  WBC    ABSOLUTE NRBC 0.00 0.00 - 0.01 K/uL    NEUTROPHILS 65 32 - 75 %    LYMPHOCYTES 21 12 - 49 %    MONOCYTES 5 5 - 13 %    EOSINOPHILS 8 (H) 0 - 7 %    BASOPHILS 1 0 - 1 %    IMMATURE GRANULOCYTES 0 0.0 - 0.5 %    ABS. NEUTROPHILS 5.8 1.8 - 8.0 K/UL    ABS. LYMPHOCYTES 1.9 0.8 - 3.5 K/UL    ABS. MONOCYTES 0.5 0.0 - 1.0 K/UL    ABS. EOSINOPHILS 0.7 (H) 0.0 - 0.4 K/UL    ABS. BASOPHILS 0.1 0.0 - 0.1 K/UL    ABS. IMM. GRANS. 0.0 0.00 - 0.04 K/UL    DF AUTOMATED     METABOLIC PANEL, COMPREHENSIVE    Collection Time: 07/28/21  1:25 PM   Result Value Ref Range    Sodium 130 (L) 136 - 145 mmol/L    Potassium 5.6 (H) 3.5 - 5.1 mmol/L    Chloride 101 97 - 108 mmol/L    CO2 22 21 - 32 mmol/L    Anion gap 7 5 - 15 mmol/L    Glucose 84 65 - 100 mg/dL    BUN 35 (H) 6 - 20 MG/DL    Creatinine 1.25 (H) 0.55 - 1.02 MG/DL    BUN/Creatinine ratio 28 (H) 12 - 20      GFR est AA 51 (L) >60 ml/min/1.73m2    GFR est non-AA 42 (L) >60 ml/min/1.73m2    Calcium 8.8 8.5 - 10.1 MG/DL    Bilirubin, total 0.4 0.2 - 1.0 MG/DL    ALT (SGPT) 20 12 - 78 U/L    AST (SGOT) 18 15 - 37 U/L    Alk.  phosphatase 128 (H) 45 - 117 U/L    Protein, total 7.0 6.4 - 8.2 g/dL    Albumin 3.8 3.5 - 5.0 g/dL    Globulin 3.2 2.0 - 4.0 g/dL    A-G Ratio 1.2 1.1 - 2.2     URINALYSIS W/ REFLEX CULTURE    Collection Time: 07/28/21  1:51 PM    Specimen: Urine   Result Value Ref Range    Color YELLOW/STRAW      Appearance CLEAR CLEAR      Specific gravity 1.011 1.003 - 1.030      pH (UA) 5.5 5.0 - 8.0      Protein Negative NEG mg/dL    Glucose Negative NEG mg/dL    Ketone Negative NEG mg/dL    Bilirubin Negative NEG      Blood Negative NEG      Urobilinogen 0.2 0.2 - 1.0 EU/dL    Nitrites Negative NEG      Leukocyte Esterase SMALL (A) NEG      WBC 10-20 0 - 4 /hpf    RBC 0-5 0 - 5 /hpf    Epithelial cells FEW FEW /lpf    Bacteria Negative NEG /hpf    UA:UC IF INDICATED URINE CULTURE ORDERED (A) CNI      Hyaline cast 0-2 0 - 5 /lpf       Radiologic Studies -   No orders to display     CT Results  (Last 48 hours)    None        CXR Results  (Last 48 hours)    None          Medical Decision Making   I am the first provider for this patient. I reviewed the vital signs, available nursing notes, past medical history, past surgical history, family history and social history. Vital Signs-Reviewed the patient's vital signs. Patient Vitals for the past 24 hrs:   Temp Pulse Resp BP SpO2   07/28/21 1500 97.8 °F (36.6 °C)   116/63 98 %   07/28/21 1453     90 %   07/28/21 1451    133/74    07/28/21 1251 97.7 °F (36.5 °C) 67 15 138/60 100 %       EKG interpretation: (Preliminary)  Normal sinus rhythm, left bundle branch block, nonspecific ST changes. Normal QTC. EKG interpretation by Leidy Aldridge MD    Records Reviewed: Nursing Notes, Old Medical Records and Previous Laboratory Studies    Provider Notes (Medical Decision Making):   Vitals are stable. On exam, patient is well-appearing, in no acute distress. Considering patient is sent here for electrolyte derangements-we will check EKG, CBC, CMP to assess renal function, sodium as well as potassium levels. UA has already been ordered and sent from triage prior to my seeing the patient.     Lab work is significant for slight hyponatremia of 130, likely not clinically significant. Possibly secondary to dehydration versus decreased sodium intake as patient has been avoiding salt heavy foods intentionally. Her potassium is only mildly elevated at 5.6-again not likely to be clinically significant, with EKG not showing any concerning hyperkalemic changes. This is also possibly secondary to dehydration versus CKD effects. Her creatinine is elevated at 1.25, with BUN of 25. Her most recent labs from outside hospital visit was reviewed (brought by patient), which shows renal function within the ranges of normal compared to her inpatient course. Discussed the above with the patient. Will treat patient with Kayexalate in the ED. no need to emergently lower potassium at this level. Discussed with patient slightly increasing her fluid intake at home as well as slightly increasing her sodium intake at home. Do not feel she needs fluid boluses in the ED as her renal function is largely normal, and she is at increased risk for being fluid overloaded in setting of CHF history. Discussed with patient that she will need to follow-up with her PCP as well as her nephrologist early for repeat labs to ensure resolution/improvement in numbers. She verbalized understanding and agreement. In addition to the above, patient also has small leukocyte esterase seen on her UA, with slightly increased WBCs, no bacteria. Few epithelial cells. Patient denies any urinary complaints. Her urine culture was automatically ordered from UA with reflex based on result. Considering patient is asymptomatic, will hold on treating her at this time, ending culture results. Patient is agreeable with this. All of her questions were answered today to her full satisfaction. She will be discharged at this time in stable condition with return precautions discussed.              Kim Weclh MD      Disposition:  Discharge      DISCHARGE PLAN:  1. Discharge Medication List as of 7/28/2021  5:22 PM        2. Follow-up Information     Follow up With Specialties Details Why Contact Info    Ronel Riojas NP Nurse Practitioner   [address] [place] [id number] [phone]      Your kidney specialist  In 1 week          3. Return to ED if worse     Diagnosis     Clinical Impression:   1. Hyponatremia    2. Acute hyperkalemia    3. Stage 3a chronic kidney disease (HCC)        Attestations:    Hailey Siddiqi MD    Please note that this dictation was completed with DocOnYou, the Smart[employer] ([employer]) voice recognition software. Quite often unanticipated grammatical, syntax, homophones, and other interpretive errors are inadvertently transcribed by the computer software. Please disregard these errors. Please excuse any errors that have escaped final proofreading. Thank you.

## 2021-07-29 ENCOUNTER — PATIENT OUTREACH (OUTPATIENT)
Dept: CASE MANAGEMENT | Age: 76
End: 2021-07-29

## 2021-07-30 LAB
BACTERIA SPEC CULT: ABNORMAL
BACTERIA SPEC CULT: ABNORMAL
CC UR VC: ABNORMAL
SERVICE CMNT-IMP: ABNORMAL

## 2021-08-10 RX ORDER — ATORVASTATIN CALCIUM 20 MG/1
20 TABLET, FILM COATED ORAL DAILY
COMMUNITY
End: 2021-12-29

## 2021-08-10 RX ORDER — SPIRONOLACTONE 25 MG/1
12.5 TABLET ORAL DAILY
COMMUNITY

## 2021-08-10 RX ORDER — LISINOPRIL 10 MG/1
10 TABLET ORAL DAILY
COMMUNITY
End: 2021-10-29

## 2021-08-10 RX ORDER — METOPROLOL SUCCINATE 25 MG/1
25 TABLET, EXTENDED RELEASE ORAL DAILY
COMMUNITY

## 2021-08-10 NOTE — PERIOP NOTES
[facility]  Ambulatory Surgery Unit  Pre-operative Instructions    Procedure Date  08/24            Tentative Arrival Time 0289      1. On the day of your procedure, please report to the Ambulatory Surgery Unit Registration Desk and sign in at your designated time. The Ambulatory Surgery Unit is located in [facility] on the [facility] side of the [facility] across from the [address] [place] [address]. Please have all of your health insurance cards and a photo ID. 2. You must have someone with you to drive you home as directed by your surgeon. 3. You may have a light breakfast and take normal morning medications. 4. We recommend you do not drink any alcoholic beverages for 24 hours before and after your procedure. 5. Contact your surgeons office for instructions on the following medications: non-steroidal anti-inflammatory drugs (i.e. Advil, Aleve), vitamins, and supplements. (Some surgeons will want you to stop these medications prior to surgery and others may allow you to take them)   **If you are currently taking Plavix, Coumadin, Aspirin and/or other blood-thinning agents, contact your surgeon for instructions. ** Your surgeon will partner with the physician prescribing these medications to determine if it is safe to stop or if you need to continue taking. Please do not stop taking these medications without instructions from your surgeon. 6. In an effort to help prevent surgical site infection, we ask that you shower with an anti-bacterial soap (i.e. Dial or Safeguard) on the morning of your procedure. Do not apply any lotions, powders, or deodorants after showering. 7. Wear comfortable clothes. Wear glasses instead of contacts. Do not bring any jewelry or money (other than copays or fees as instructed). Do not wear make-up, particularly mascara, the morning of your procedure. Wear your hair loose or down, no ponytails, buns, chelly pins or clips.  All body piercings must be removed. 8. You should understand that if you do not follow these instructions your procedure may be cancelled. If your physical condition changes (i.e. fever, cold or flu) please contact your surgeon as soon as possible. 9. It is important that you be on time. If a situation occurs where you may be late, or if you have any questions or problems, please call [phone].    10. Your procedure time may be subject to change. You will receive a phone call the day prior to confirm your arrival time. I understand a pre-operative phone call will be made to verify my procedure time. In the event that I am not available, I give permission for a message to be left on my answering service and/or with another person?       Yes    Reviewed with patient over phone who verbalized understanding         ___________________      ___________________      ___________________  (Signature of Patient)          (Witness)                   (Date and Time)

## 2021-08-24 ENCOUNTER — APPOINTMENT (OUTPATIENT)
Dept: GENERAL RADIOLOGY | Age: 76
End: 2021-08-24
Attending: PHYSICAL MEDICINE & REHABILITATION
Payer: MEDICARE

## 2021-08-24 ENCOUNTER — HOSPITAL ENCOUNTER (OUTPATIENT)
Age: 76
Setting detail: OUTPATIENT SURGERY
Discharge: HOME | End: 2021-08-24
Attending: PHYSICAL MEDICINE & REHABILITATION | Admitting: PHYSICAL MEDICINE & REHABILITATION
Payer: MEDICARE

## 2021-08-24 VITALS
HEART RATE: 69 BPM | BODY MASS INDEX: 46.33 KG/M2 | DIASTOLIC BLOOD PRESSURE: 64 MMHG | SYSTOLIC BLOOD PRESSURE: 168 MMHG | RESPIRATION RATE: 18 BRPM | HEIGHT: 60 IN | TEMPERATURE: 97.8 F | OXYGEN SATURATION: 95 % | WEIGHT: 236 LBS

## 2021-08-24 PROCEDURE — 72020 X-RAY EXAM OF SPINE 1 VIEW: CPT

## 2021-08-24 PROCEDURE — 74011000250 HC RX REV CODE- 250: Performed by: PHYSICAL MEDICINE & REHABILITATION

## 2021-08-24 PROCEDURE — 76030000002 HC AMB SURG OR TIME FIRST 0.: Performed by: PHYSICAL MEDICINE & REHABILITATION

## 2021-08-24 PROCEDURE — 74011250636 HC RX REV CODE- 250/636: Performed by: PHYSICAL MEDICINE & REHABILITATION

## 2021-08-24 PROCEDURE — 76000 FLUOROSCOPY <1 HR PHYS/QHP: CPT

## 2021-08-24 PROCEDURE — 76210000057 HC AMBSU PH II REC 1 TO 1.5 HR: Performed by: PHYSICAL MEDICINE & REHABILITATION

## 2021-08-24 PROCEDURE — 74011000636 HC RX REV CODE- 636: Performed by: PHYSICAL MEDICINE & REHABILITATION

## 2021-08-24 PROCEDURE — 77030003665 HC NDL SPN BBMI -A: Performed by: PHYSICAL MEDICINE & REHABILITATION

## 2021-08-24 PROCEDURE — 2709999900 HC NON-CHARGEABLE SUPPLY: Performed by: PHYSICAL MEDICINE & REHABILITATION

## 2021-08-24 RX ORDER — LIDOCAINE HYDROCHLORIDE 20 MG/ML
5 INJECTION, SOLUTION EPIDURAL; INFILTRATION; INTRACAUDAL; PERINEURAL ONCE
Status: COMPLETED | OUTPATIENT
Start: 2021-08-24 | End: 2021-08-24

## 2021-08-24 RX ORDER — METHYLPREDNISOLONE ACETATE 40 MG/ML
80 INJECTION, SUSPENSION INTRA-ARTICULAR; INTRALESIONAL; INTRAMUSCULAR; SOFT TISSUE ONCE
Status: COMPLETED | OUTPATIENT
Start: 2021-08-24 | End: 2021-08-24

## 2021-08-24 RX ORDER — BUPIVACAINE HYDROCHLORIDE 5 MG/ML
5 INJECTION, SOLUTION EPIDURAL; INTRACAUDAL ONCE
Status: DISCONTINUED | OUTPATIENT
Start: 2021-08-24 | End: 2021-08-24 | Stop reason: HOSPADM

## 2021-08-24 NOTE — DISCHARGE INSTRUCTIONS
Dr. Elyse Gutierrez Discharge Instructions  Transforaminal Epidural Steroid Injection/ Selective Nerve Block    You had a transforaminal epidural steroid injection/ selective nerve block today. You will probably have some numbness, and possibly weakness, in your leg for the next 6 to 8 hours. The steroids will slowly become effective, reducing your pain, over the next 2 weeks. You should begin feeling better after a few days, but it may take up to 2 weeks to notice the difference. The benefit you get from your injection will last a variable amount of time, depending on the severity of your lumbar spine problem.  Pain: Most people do not have any increase in pain after this injection. However, you might experience some soreness in your low back. If this happens, putting an ice pack over the sore area will help.  Bandage: You will have a small bandage covering the site of the injection. You may remove it once you get home.  Restrictions: Someone should drive you home after the injection. After that, you have no restrictions. You need to be careful while walking, as you may still have some numbness or weakness in your leg. You may resume your normal level of activity. You may take a shower or bath, and you may eat normally. You should continue your current exercises and/or therapy routine.   Medications: Continue your current medications as prescribed. If your pain decreases, you may reduce the amount of your pain medicines. If you stopped taking anticoagulants or blood-thinners before the injection, start them tomorrow. If you have diabetes, your blood sugar may be elevated for a few days. Call your primary doctor with any questions.   Call Dr. Elyse Gutierrez at [phone] if you experience:   Fever (101 degrees Fahrenheit or greater)   Nausea or vomiting   Headache unrelieved by your normal pain medicine   Redness or swelling at the injection site that lasts more than 1 day   New numbness, tingling, weakness, or pain that you didnt have before the injection    Follow-up appointment:   If still having pain in 1-2 weeks, call office at [phone] for a follow up appointment. DISCHARGE SUMMARY from Nurse    The following personal items collected during your admission are returned to you:   Dental Appliance: Dental Appliances: None  Vision: Visual Aid: Glasses  Hearing Aid:    Jewelry: Jewelry: None  Clothing: Clothing: With patient  Other Valuables: Other Valuables: Eyeglasses, With patient  Valuables sent to safe: Personal Items Sent to Safe: Purse locked in med room      If you were given prescriptions, please review the written information on prescribed medications. · You will receive a Post Operative Call from one of the Recovery Room Nurses on the day after your surgery to check on you. It is very important for us to know how you are recovering after your surgery. · You may receive an e-mail or letter in the mail from [employer] regarding your experience with us in the Ambulatory Surgery Unit. Your feedback is valuable to us and we appreciate your participation in the survey. If you have not had your influenza or pneumococcal vaccines, please follow up with your primary care physician. The discharge information has been reviewed with the patient. The patient verbalized understanding.

## 2021-08-24 NOTE — PERIOP NOTES
Daxa Larson  1945  [id number]    Situation:  Verbal report given from: BRIANNE Anna RN  Procedure: Procedure(s):  BILATERAL L4 TRANSFORAMINAL EPIDURAL STEROID INJECTION    Background:    Preoperative diagnosis: DDD (degenerative disc disease), lumbar [M51.36]    Postoperative diagnosis: * No post-op diagnosis entered *    :  Dr. Osorio Davis:  Intra-procedure medications, procedure, and allergies reviewed        Recommendation:    Discharge patient home after discharge instructions reviewed with patient. Rest until local has worn off.

## 2021-08-24 NOTE — PERIOP NOTES
Patient: Keith Basilio MRN: [id number]  SSN: [id number]   YOB: 1945  Age: 70 y.o. Sex: female     Patient is status post Procedure(s):  BILATERAL L4 TRANSFORAMINAL EPIDURAL STEROID INJECTION.     Surgeon(s) and Role:     * Mp Canas MD - Primary    Local/Dose/Irrigation:  SEE MAR                                       Dressing/Packing:     Cheryl Cai

## 2021-08-24 NOTE — PERIOP NOTES
PATIENT HAVING SOME DISCOMFORT WHEN ASSESSED BY DR. Mary Pires. ADDITIONAL NUMBING MEDICATION GIVEN &ALLOWED TO HAVE DESIRED AFFECT, PATIENT NOW TOLERATING PROCEDURE WITHOUT COMPLAINT OF PAIN/DISCOMFORT.

## 2021-08-24 NOTE — PERIOP NOTES
Permission received to review discharge instructions and discuss private health information with son, Marc Olivas. Hand  are equally strong, bilat. Leg/foot pushes/pulls are equally strong, bilat.

## 2021-08-24 NOTE — PERIOP NOTES
1509-Pt has weak and equal plantar and dorsi flexion. Weak on push downs and strong on pull ups. Pt able to stand and hold weight. No swelling or bleeding at injection sites      1520-D/c instructions reviewed with pt, all questions answered. Reviewed when to call the doctor, diet, and activity. Pt having numbness in L leg    1530-Pt assisted into wheelchair to give back support while legs wake up more.     1600-Attempted to stand pt up, both upper legs still feel numb.    1623-Transported via w/c to awaiting transportation as pt can now bear weight on both legs and take steps

## 2021-08-24 NOTE — OP NOTES
Epidural Steroid Injection Operative Report    Indications: This is a 70 y.o. female who presents with low back pain. She was positive for LS DDD. The patient was admitted for surgery as conservative measures have failed. Date of Surgery: 8/24/2021    Preoperative Diagnosis: LS DDD    Postoperative Diagnosis: LS DDD    Surgeon(s) and Role:     * Barbara Gonzalez MD - Primary     Procedure:  Procedure(s):  BILATERAL L4 TRANSFORAMINAL EPIDURAL STEROID INJECTION    Procedure in Detail:  After appropriate informed consent was obtained, the patient was taken to the operating suite and placed in the prone position on the operating table on appropriate padding. The LS region was prepped and draped in the usual sterile fashion. Intraoperative fluoroscopy was used to localize the LS spine. The skin was infiltrated with 2% lidocaine. An 22-g needle was advanced into the Jose L4 neuroforamen under fluoroscopic guidance. A small amount of contrast was injected into the epidural space, confirming appropriate needle placement on fluoroscopy. Next, 2ml of 2% lidocaine and 80mg of Depo-Medrol were injected. The needle was removed from the patient. The patient was then turned back into the supine position on the stretcher and was taken to the Recovery Room in stable condition.     Estimated Blood Loss:  none     Specimens: None       Drains: None          Complications:  None    Signed By: Waynard Hatchet, MD                        August 24, 2021

## 2021-09-24 ENCOUNTER — HOSPITAL ENCOUNTER (EMERGENCY)
Age: 76
Discharge: HOME | End: 2021-09-24
Attending: EMERGENCY MEDICINE
Payer: MEDICARE

## 2021-09-24 VITALS
RESPIRATION RATE: 19 BRPM | TEMPERATURE: 98 F | DIASTOLIC BLOOD PRESSURE: 73 MMHG | OXYGEN SATURATION: 100 % | HEART RATE: 65 BPM | WEIGHT: 234.79 LBS | HEIGHT: 60 IN | SYSTOLIC BLOOD PRESSURE: 133 MMHG | BODY MASS INDEX: 46.1 KG/M2

## 2021-09-24 DIAGNOSIS — E87.5 ACUTE HYPERKALEMIA: ICD-10-CM

## 2021-09-24 DIAGNOSIS — N39.0 URINARY TRACT INFECTION WITHOUT HEMATURIA, SITE UNSPECIFIED: ICD-10-CM

## 2021-09-24 DIAGNOSIS — N18.9 CHRONIC KIDNEY DISEASE, UNSPECIFIED CKD STAGE: ICD-10-CM

## 2021-09-24 DIAGNOSIS — R10.9 FLANK PAIN: Primary | ICD-10-CM

## 2021-09-24 LAB
ALBUMIN SERPL-MCNC: 3.5 G/DL (ref 3.5–5)
ALBUMIN/GLOB SERPL: 1.1 {RATIO} (ref 1.1–2.2)
ALP SERPL-CCNC: 96 U/L (ref 45–117)
ALT SERPL-CCNC: 18 U/L (ref 12–78)
ANION GAP SERPL CALC-SCNC: 4 MMOL/L (ref 5–15)
APPEARANCE UR: ABNORMAL
AST SERPL-CCNC: 15 U/L (ref 15–37)
BACTERIA URNS QL MICRO: NEGATIVE /HPF
BASOPHILS # BLD: 0.1 K/UL (ref 0–0.1)
BASOPHILS NFR BLD: 1 % (ref 0–1)
BILIRUB SERPL-MCNC: 0.5 MG/DL (ref 0.2–1)
BILIRUB UR QL: NEGATIVE
BUN SERPL-MCNC: 34 MG/DL (ref 6–20)
BUN/CREAT SERPL: 26 (ref 12–20)
CALCIUM SERPL-MCNC: 9.1 MG/DL (ref 8.5–10.1)
CHLORIDE SERPL-SCNC: 105 MMOL/L (ref 97–108)
CO2 SERPL-SCNC: 25 MMOL/L (ref 21–32)
COLOR UR: ABNORMAL
CREAT SERPL-MCNC: 1.31 MG/DL (ref 0.55–1.02)
DIFFERENTIAL METHOD BLD: ABNORMAL
EOSINOPHIL # BLD: 0.5 K/UL (ref 0–0.4)
EOSINOPHIL NFR BLD: 6 % (ref 0–7)
EPITH CASTS URNS QL MICRO: ABNORMAL /LPF
ERYTHROCYTE [DISTWIDTH] IN BLOOD BY AUTOMATED COUNT: 15.1 % (ref 11.5–14.5)
GLOBULIN SER CALC-MCNC: 3.3 G/DL (ref 2–4)
GLUCOSE SERPL-MCNC: 83 MG/DL (ref 65–100)
GLUCOSE UR STRIP.AUTO-MCNC: NEGATIVE MG/DL
HCT VFR BLD AUTO: 35.7 % (ref 35–47)
HGB BLD-MCNC: 11.4 G/DL (ref 11.5–16)
HGB UR QL STRIP: NEGATIVE
HYALINE CASTS URNS QL MICRO: ABNORMAL /LPF (ref 0–5)
IMM GRANULOCYTES # BLD AUTO: 0 K/UL (ref 0–0.04)
IMM GRANULOCYTES NFR BLD AUTO: 1 % (ref 0–0.5)
KETONES UR QL STRIP.AUTO: NEGATIVE MG/DL
LEUKOCYTE ESTERASE UR QL STRIP.AUTO: ABNORMAL
LIPASE SERPL-CCNC: 109 U/L (ref 73–393)
LYMPHOCYTES # BLD: 1.3 K/UL (ref 0.8–3.5)
LYMPHOCYTES NFR BLD: 17 % (ref 12–49)
MCH RBC QN AUTO: 30 PG (ref 26–34)
MCHC RBC AUTO-ENTMCNC: 31.9 G/DL (ref 30–36.5)
MCV RBC AUTO: 93.9 FL (ref 80–99)
MONOCYTES # BLD: 0.8 K/UL (ref 0–1)
MONOCYTES NFR BLD: 10 % (ref 5–13)
NEUTS SEG # BLD: 5.2 K/UL (ref 1.8–8)
NEUTS SEG NFR BLD: 65 % (ref 32–75)
NITRITE UR QL STRIP.AUTO: NEGATIVE
NRBC # BLD: 0 K/UL (ref 0–0.01)
NRBC BLD-RTO: 0 PER 100 WBC
PH UR STRIP: 5 [PH] (ref 5–8)
PLATELET # BLD AUTO: 289 K/UL (ref 150–400)
PMV BLD AUTO: 9.6 FL (ref 8.9–12.9)
POTASSIUM SERPL-SCNC: 5.6 MMOL/L (ref 3.5–5.1)
PROT SERPL-MCNC: 6.8 G/DL (ref 6.4–8.2)
PROT UR STRIP-MCNC: NEGATIVE MG/DL
RBC # BLD AUTO: 3.8 M/UL (ref 3.8–5.2)
RBC #/AREA URNS HPF: ABNORMAL /HPF (ref 0–5)
SODIUM SERPL-SCNC: 134 MMOL/L (ref 136–145)
SP GR UR REFRACTOMETRY: 1.02 (ref 1–1.03)
UROBILINOGEN UR QL STRIP.AUTO: 0.2 EU/DL (ref 0.2–1)
WBC # BLD AUTO: 7.9 K/UL (ref 3.6–11)
WBC URNS QL MICRO: ABNORMAL /HPF (ref 0–4)

## 2021-09-24 PROCEDURE — 83690 ASSAY OF LIPASE: CPT

## 2021-09-24 PROCEDURE — 36415 COLL VENOUS BLD VENIPUNCTURE: CPT

## 2021-09-24 PROCEDURE — 81001 URINALYSIS AUTO W/SCOPE: CPT

## 2021-09-24 PROCEDURE — 87077 CULTURE AEROBIC IDENTIFY: CPT

## 2021-09-24 PROCEDURE — 99284 EMERGENCY DEPT VISIT MOD MDM: CPT

## 2021-09-24 PROCEDURE — 87086 URINE CULTURE/COLONY COUNT: CPT

## 2021-09-24 PROCEDURE — 87186 SC STD MICRODIL/AGAR DIL: CPT

## 2021-09-24 PROCEDURE — 85025 COMPLETE CBC W/AUTO DIFF WBC: CPT

## 2021-09-24 PROCEDURE — 80053 COMPREHEN METABOLIC PANEL: CPT

## 2021-09-24 PROCEDURE — 74011250637 HC RX REV CODE- 250/637: Performed by: EMERGENCY MEDICINE

## 2021-09-24 RX ORDER — CEFDINIR 300 MG/1
300 CAPSULE ORAL 2 TIMES DAILY
Qty: 14 CAPSULE | Refills: 0 | Status: SHIPPED | OUTPATIENT
Start: 2021-09-24 | End: 2021-10-01

## 2021-09-24 RX ORDER — CEFDINIR 300 MG/1
300 CAPSULE ORAL EVERY 12 HOURS
Status: DISCONTINUED | OUTPATIENT
Start: 2021-09-24 | End: 2021-09-24

## 2021-09-24 RX ORDER — CEFDINIR 300 MG/1
300 CAPSULE ORAL EVERY 12 HOURS
Status: DISCONTINUED | OUTPATIENT
Start: 2021-09-24 | End: 2021-09-24 | Stop reason: HOSPADM

## 2021-09-24 RX ADMIN — CEFDINIR 300 MG: 300 CAPSULE ORAL at 16:43

## 2021-09-24 NOTE — ED NOTES
Bedside shift change report given to [address] [place] [address] (oncoming nurse) by Fernando Ferreira RN (offgoing nurse). Report included the following information SBAR, ED Summary and MAR.

## 2021-09-24 NOTE — ED PROVIDER NOTES
EMERGENCY DEPARTMENT HISTORY AND PHYSICAL EXAM      Date: 9/24/2021  Patient Name: Linda Landry    History of Presenting Illness     Chief Complaint   Patient presents with    Flank Pain     left flank pain onset 3 to 4 days ago. sent to ED from [facility] to check for a blood clot because of abnormal lab results       History Provided By: Patient    HPI: Linda Landry, 70 y.o. female with PMHx as noted below presents the emergency department with complaints of left flank pain. Patient states she had onset of intermittent left flank pain 5 days ago. She describes it as a mild, dull aching pain that is without relieving or exacerbating factors. Pain does not radiate. Patient notes she currently is pain-free. Patient was seen at urgent care earlier today and told that she had an elevated D-dimer needed to come here to rule out a blood clot. Pt denies any other alleviating or exacerbating factors. Additionally, pt specifically denies any recent fever, chills, headache, nausea, vomiting, abdominal pain, CP, SOB, lightheadedness, dizziness, numbness, weakness, BLE swelling, heart palpitations,  diarrhea, constipation, melena, hematochezia, cough, or congestion. PCP: Vicki Lopez NP    Current Facility-Administered Medications   Medication Dose Route Frequency Provider Last Rate Last Admin    cefdinir (OMNICEF) capsule 300 mg  300 mg Oral Q12H Kota Cline MD         Current Outpatient Medications   Medication Sig Dispense Refill    cefdinir (OMNICEF) 300 mg capsule Take 1 Capsule by mouth two (2) times a day for 7 days. 14 Capsule 0    atorvastatin (LIPITOR) 20 mg tablet Take 20 mg by mouth daily. (Patient not taking: Reported on 8/24/2021)      lisinopriL (PRINIVIL, ZESTRIL) 10 mg tablet Take 10 mg by mouth daily. Every other day      spironolactone (ALDACTONE) 25 mg tablet Take 25 mg by mouth daily.  metoprolol succinate (TOPROL-XL) 25 mg XL tablet Take 25 mg by mouth daily.       acetaminophen (TYLENOL) 650 mg TbER Take 650 mg by mouth every eight (8) hours.  naproxen sodium (ALEVE PO) Take 600 mg by mouth two (2) times a day.  diphenhydrAMINE (BenadryL) 25 mg capsule Take 12.5 mg by mouth nightly as needed.  furosemide (Lasix) 20 mg tablet Take 20 mg by mouth as needed.  traMADoL (ULTRAM) 50 mg tablet TAKE 1 TABLET BY MOUTH EVERY 6 HOURS AS NEEDED FOR PAIN UP TO  30 DAYS . DO NOT EXCEED 4 PER 24 HOURS 30 Tab 0    pregabalin (Lyrica) 25 mg capsule Take 25 mg by mouth as needed. Past History     Past Medical History:  Past Medical History:   Diagnosis Date    Adverse effect of anesthesia     combative with anesthesia    Arthritis     Cancer ([facility] [address]     tumor on nose, removed, pt not sure what type    Fibromyalgia     Hypertension     Ill-defined condition     CHF- per patient    Sinus bradycardia     [place]    Stage 3 chronic kidney disease ([facility] [address]     sees Shae Mcdonough       Past Surgical History:  Past Surgical History:   Procedure Laterality Date    HX COLONOSCOPY      HX DILATION AND CURETTAGE      HX TONSILLECTOMY      as a child    IR INJ SPINE FLUORO GUIDED  2/22/2021       Family History:  Family History   Problem Relation Age of Onset    Diabetes Mother     Heart Disease Mother     Hypertension Mother     Hypertension Father     Heart Disease Father        Social History:  Social History     Tobacco Use    Smoking status: Never Smoker    Smokeless tobacco: Never Used   Substance Use Topics    Alcohol use: No    Drug use: Never       Allergies:  No Known Allergies      Review of Systems   Review of Systems  Constitutional: Negative for fever, chills, and fatigue. HENT: Negative for congestion, sore throat, rhinorrhea, sneezing and neck stiffness   Eyes: Negative for discharge and redness.    Respiratory: Negative for  shortness of breath, wheezing   Cardiovascular: Negative for chest pain, palpitations   Gastrointestinal: Negative for nausea, vomiting, abdominal pain, constipation, diarrhea and blood in stool. Genitourinary: Positive flank pain. Negative for dysuria, hematuria, decreased urine volume, discharge,   Musculoskeletal: Negative for myalgias or joint pain . Skin: Negative for rash or lesions . Neurological: Negative weakness, light-headedness, numbness and headaches. Physical Exam   Physical Exam    GENERAL: alert and oriented, no acute distress  EYES: PEERL, No injection, discharge or icterus. ENT: Mucous membranes pink and moist.  NECK: Supple  LUNGS: Airway patent. Non-labored respirations. Breath sounds clear with good air entry bilaterally. HEART: Regular rate and rhythm. No peripheral edema  ABDOMEN: Non-distended and non-tender, without guarding or rebound. No CVA tenderness, no midline spinal tenderness  SKIN:  warm, dry  MSK/EXTREMITIES: Without swelling, tenderness or deformity, symmetric with normal ROM  NEUROLOGICAL: Alert, oriented      Diagnostic Study Results     Labs -     Recent Results (from the past 12 hour(s))   METABOLIC PANEL, COMPREHENSIVE    Collection Time: 09/24/21 12:02 PM   Result Value Ref Range    Sodium 134 (L) 136 - 145 mmol/L    Potassium 5.6 (H) 3.5 - 5.1 mmol/L    Chloride 105 97 - 108 mmol/L    CO2 25 21 - 32 mmol/L    Anion gap 4 (L) 5 - 15 mmol/L    Glucose 83 65 - 100 mg/dL    BUN 34 (H) 6 - 20 MG/DL    Creatinine 1.31 (H) 0.55 - 1.02 MG/DL    BUN/Creatinine ratio 26 (H) 12 - 20      GFR est AA 48 (L) >60 ml/min/1.73m2    GFR est non-AA 40 (L) >60 ml/min/1.73m2    Calcium 9.1 8.5 - 10.1 MG/DL    Bilirubin, total 0.5 0.2 - 1.0 MG/DL    ALT (SGPT) 18 12 - 78 U/L    AST (SGOT) 15 15 - 37 U/L    Alk.  phosphatase 96 45 - 117 U/L    Protein, total 6.8 6.4 - 8.2 g/dL    Albumin 3.5 3.5 - 5.0 g/dL    Globulin 3.3 2.0 - 4.0 g/dL    A-G Ratio 1.1 1.1 - 2.2     URINALYSIS W/ RFLX MICROSCOPIC    Collection Time: 09/24/21 12:02 PM   Result Value Ref Range    Color YELLOW/STRAW      Appearance CLOUDY (A) CLEAR      Specific gravity 1.017 1.003 - 1.030      pH (UA) 5.0 5.0 - 8.0      Protein Negative NEG mg/dL    Glucose Negative NEG mg/dL    Ketone Negative NEG mg/dL    Bilirubin Negative NEG      Blood Negative NEG      Urobilinogen 0.2 0.2 - 1.0 EU/dL    Nitrites Negative NEG      Leukocyte Esterase MODERATE (A) NEG      WBC 20-50 0 - 4 /hpf    RBC 0-5 0 - 5 /hpf    Epithelial cells FEW FEW /lpf    Bacteria Negative NEG /hpf    Hyaline cast 2-5 0 - 5 /lpf   LIPASE    Collection Time: 09/24/21 12:02 PM   Result Value Ref Range    Lipase 109 73 - 393 U/L   CBC WITH AUTOMATED DIFF    Collection Time: 09/24/21 12:02 PM   Result Value Ref Range    WBC 7.9 3.6 - 11.0 K/uL    RBC 3.80 3.80 - 5.20 M/uL    HGB 11.4 (L) 11.5 - 16.0 g/dL    HCT 35.7 35.0 - 47.0 %    MCV 93.9 80.0 - 99.0 FL    MCH 30.0 26.0 - 34.0 PG    MCHC 31.9 30.0 - 36.5 g/dL    RDW 15.1 (H) 11.5 - 14.5 %    PLATELET 207 866 - 381 K/uL    MPV 9.6 8.9 - 12.9 FL    NRBC 0.0 0  WBC    ABSOLUTE NRBC 0.00 0.00 - 0.01 K/uL    NEUTROPHILS 65 32 - 75 %    LYMPHOCYTES 17 12 - 49 %    MONOCYTES 10 5 - 13 %    EOSINOPHILS 6 0 - 7 %    BASOPHILS 1 0 - 1 %    IMMATURE GRANULOCYTES 1 (H) 0.0 - 0.5 %    ABS. NEUTROPHILS 5.2 1.8 - 8.0 K/UL    ABS. LYMPHOCYTES 1.3 0.8 - 3.5 K/UL    ABS. MONOCYTES 0.8 0.0 - 1.0 K/UL    ABS. EOSINOPHILS 0.5 (H) 0.0 - 0.4 K/UL    ABS. BASOPHILS 0.1 0.0 - 0.1 K/UL    ABS. IMM. GRANS. 0.0 0.00 - 0.04 K/UL    DF AUTOMATED         Radiologic Studies -   No orders to display     CT Results  (Last 48 hours)    None        CXR Results  (Last 48 hours)    None            Medical Decision Making     I, Ruthann Chong MD am the first provider for this patient and am the attending of record for this patient encounter. I reviewed the vital signs, available nursing notes, past medical history, past surgical history, family history and social history. Vital Signs-Reviewed the patient's vital signs.   Patient Vitals for the past 12 hrs:   Temp Pulse Resp BP SpO2   09/24/21 1530 98 °F (36.7 °C) 65 19 133/73 100 %   09/24/21 1500  68 16 118/68    09/24/21 1131 98 °F (36.7 °C) 67 16 (!) 141/71 100 %       Records Reviewed: Nursing Notes and Old Medical Records    Provider Notes (Medical Decision Making): On presentation, the patient is well appearing, in no acute distress with normal vital signs. Based on my history and exam the differential diagnosis for this patient includes pyelonephritis, musculoskeletal pain, obstructive ureteral stone, pneumonia, pulmonary embolism, other intra-abdominal pathology, aortic pathology. Patient currently is entirely asymptomatic with reassuring vital signs. Labs reviewed and notable for baseline kidney disease, mild hyperkalemia which is the same as previous labs. Urinalysis questionable for possible urinary tract infection. Will treat with cefdinir after review of her previous urine culture which was sensitive to this. Patient was initially sent here for elevated D-dimer however when I reviewed labs from her urgent care visit her D-dimer actually is not elevated, was 528 which is within normal range for her age-adjusted D-dimer level. She is not having any tachycardia, shortness of breath or other risk factors for PE so we will not obtain a CT angiogram at this time. ED Course:   Initial assessment performed. The patients presenting problems have been discussed, and they are in agreement with the care plan formulated and outlined with them. I have encouraged them to ask questions as they arise throughout their visit. Elias Byrd's  results have been reviewed with her. She has been counseled regarding her diagnosis. She verbally conveys understanding and agreement of the signs, symptoms, diagnosis, treatment and prognosis and additionally agrees to follow up as recommended with Dr. Demetris Jacob, SCOOTER in 24 - 48 hours.   She also agrees with the care-plan and conveys that all of her questions have been answered. I have also put together some discharge instructions for her that include: 1) educational information regarding their diagnosis, 2) how to care for their diagnosis at home, as well a 3) list of reasons why they would want to return to the ED prior to their follow-up appointment, should their condition change. Disposition:  home    PLAN:  1. Current Discharge Medication List      START taking these medications    Details   cefdinir (OMNICEF) 300 mg capsule Take 1 Capsule by mouth two (2) times a day for 7 days. Qty: 14 Capsule, Refills: 0  Start date: 9/24/2021, End date: 10/1/2021           2. Follow-up Information     Follow up With Specialties Details Why Contact Info    Dominga Riojas NP Nurse Practitioner Schedule an appointment as soon as possible for a visit in 2 days  [address] [place] [address]  [phone]      [facility] EMERGENCY DEPT Emergency Medicine  If symptoms worsen [address] [place], [place] [address] [facility]  [phone]    Call your nephrologist for follow-up            Return to ED if worse     Diagnosis     Clinical Impression:   1. Flank pain    2. Urinary tract infection without hematuria, site unspecified    3. Chronic kidney disease, unspecified CKD stage    4. Acute hyperkalemia        Please note that this dictation was completed with Dragon, computer voice recognition software. Quite often unanticipated grammatical, syntax, homophones, and other interpretive errors are inadvertently transcribed by the computer software. Please disregard these errors. Additionally, please excuse any errors that have escaped final proofreading.

## 2021-09-26 LAB
BACTERIA SPEC CULT: ABNORMAL
CC UR VC: ABNORMAL
SERVICE CMNT-IMP: ABNORMAL

## 2021-10-29 RX ORDER — LOSARTAN POTASSIUM 25 MG/1
25 TABLET ORAL DAILY
COMMUNITY

## 2021-10-29 NOTE — PERIOP NOTES
[facility]  Ambulatory Surgery Unit  Pre-operative Instructions    Procedure Date  11/9            Tentative Arrival Time 1200pm      1. On the day of your procedure, please report to the Ambulatory Surgery Unit Registration Desk and sign in at your designated time. The Ambulatory Surgery Unit is located in [facility] on the [facility] side of the [facility] across from the [facility]. Please have all of your health insurance cards and a photo ID. 2. You must have someone with you to drive you home as directed by your surgeon. 3. You may have a light breakfast and take normal morning medications. 4. We recommend you do not drink any alcoholic beverages for 24 hours before and after your procedure. 5. Contact your surgeons office for instructions on the following medications: non-steroidal anti-inflammatory drugs (i.e. Advil, Aleve), vitamins, and supplements. (Some surgeons will want you to stop these medications prior to surgery and others may allow you to take them)   **If you are currently taking Plavix, Coumadin, Aspirin and/or other blood-thinning agents, contact your surgeon for instructions. ** Your surgeon will partner with the physician prescribing these medications to determine if it is safe to stop or if you need to continue taking. Please do not stop taking these medications without instructions from your surgeon. 6. In an effort to help prevent surgical site infection, we ask that you shower with an anti-bacterial soap (i.e. Dial or Safeguard) on the morning of your procedure. Do not apply any lotions, powders, or deodorants after showering. 7. Wear comfortable clothes. Wear glasses instead of contacts. Do not bring any jewelry or money (other than copays or fees as instructed). Do not wear make-up, particularly mascara, the morning of your procedure. Wear your hair loose or down, no ponytails, buns, chelly pins or clips.  All body piercings must be removed. 8. You should understand that if you do not follow these instructions your procedure may be cancelled. If your physical condition changes (i.e. fever, cold or flu) please contact your surgeon as soon as possible. 9. It is important that you be on time. If a situation occurs where you may be late, or if you have any questions or problems, please call [phone].    10. Your procedure time may be subject to change. You will receive a phone call the day prior to confirm your arrival time. I understand a pre-operative phone call will be made to verify my procedure time. In the event that I am not available, I give permission for a message to be left on my answering service and/or with another person?       yes    Reviewed by phone with pt, verbalized understanding.     ___________________      ___________________      ___________________  (Signature of Patient)          (Witness)                   (Date and Time)

## 2021-11-09 ENCOUNTER — APPOINTMENT (OUTPATIENT)
Dept: GENERAL RADIOLOGY | Age: 76
End: 2021-11-09
Attending: PHYSICAL MEDICINE & REHABILITATION
Payer: MEDICARE

## 2021-11-09 ENCOUNTER — HOSPITAL ENCOUNTER (OUTPATIENT)
Age: 76
Setting detail: OUTPATIENT SURGERY
Discharge: HOME | End: 2021-11-09
Attending: PHYSICAL MEDICINE & REHABILITATION | Admitting: PHYSICAL MEDICINE & REHABILITATION
Payer: MEDICARE

## 2021-11-09 VITALS
TEMPERATURE: 97.4 F | BODY MASS INDEX: 47.32 KG/M2 | SYSTOLIC BLOOD PRESSURE: 159 MMHG | DIASTOLIC BLOOD PRESSURE: 89 MMHG | HEART RATE: 78 BPM | OXYGEN SATURATION: 99 % | HEIGHT: 60 IN | RESPIRATION RATE: 16 BRPM | WEIGHT: 241 LBS

## 2021-11-09 PROCEDURE — 74011000636 HC RX REV CODE- 636: Performed by: PHYSICAL MEDICINE & REHABILITATION

## 2021-11-09 PROCEDURE — 76030000002 HC AMB SURG OR TIME FIRST 0.: Performed by: PHYSICAL MEDICINE & REHABILITATION

## 2021-11-09 PROCEDURE — 76210000046 HC AMBSU PH II REC FIRST 0.5 HR: Performed by: PHYSICAL MEDICINE & REHABILITATION

## 2021-11-09 PROCEDURE — 74011250636 HC RX REV CODE- 250/636: Performed by: PHYSICAL MEDICINE & REHABILITATION

## 2021-11-09 PROCEDURE — 77030003665 HC NDL SPN BBMI -A: Performed by: PHYSICAL MEDICINE & REHABILITATION

## 2021-11-09 PROCEDURE — 76000 FLUOROSCOPY <1 HR PHYS/QHP: CPT

## 2021-11-09 PROCEDURE — 72020 X-RAY EXAM OF SPINE 1 VIEW: CPT

## 2021-11-09 PROCEDURE — 74011000250 HC RX REV CODE- 250: Performed by: PHYSICAL MEDICINE & REHABILITATION

## 2021-11-09 PROCEDURE — 2709999900 HC NON-CHARGEABLE SUPPLY: Performed by: PHYSICAL MEDICINE & REHABILITATION

## 2021-11-09 RX ORDER — LIDOCAINE HYDROCHLORIDE 20 MG/ML
10 INJECTION, SOLUTION INFILTRATION; PERINEURAL ONCE
Status: COMPLETED | OUTPATIENT
Start: 2021-11-09 | End: 2021-11-09

## 2021-11-09 RX ORDER — METHYLPREDNISOLONE ACETATE 40 MG/ML
80 INJECTION, SUSPENSION INTRA-ARTICULAR; INTRALESIONAL; INTRAMUSCULAR; SOFT TISSUE ONCE
Status: COMPLETED | OUTPATIENT
Start: 2021-11-09 | End: 2021-11-09

## 2021-11-09 RX ORDER — BUPIVACAINE HYDROCHLORIDE 5 MG/ML
10 INJECTION, SOLUTION EPIDURAL; INTRACAUDAL ONCE
Status: DISCONTINUED | OUTPATIENT
Start: 2021-11-09 | End: 2021-11-09 | Stop reason: HOSPADM

## 2021-11-09 NOTE — OP NOTES
Epidural Steroid Injection Operative Report    Indications: This is a 78 y.o. female who presents with low back pain. She was positive for LS DDD. The patient was admitted for surgery as conservative measures have failed. Date of Surgery: 11/9/2021    Preoperative Diagnosis: LS DDD    Postoperative Diagnosis: LS DDD    Surgeon(s) and Role:     * Florence Silva MD - Primary     Procedure:  Procedure(s):  LEFT L4 AND L5 TRANSFORAMINAL EPIDURAL STEROID INJECTION WITH LOCAL    Procedure in Detail:  After appropriate informed consent was obtained, the patient was taken to the operating suite and placed in the prone position on the operating table on appropriate padding. The LS region was prepped and draped in the usual sterile fashion. Intraoperative fluoroscopy was used to localize the LS spine. The skin was infiltrated with 2% lidocaine. An 25-g needle was advanced into the Left L4 and L5 neuroforamen under fluoroscopic guidance. A small amount of contrast was injected into the epidural space, confirming appropriate needle placement on fluoroscopy. Next, 2ml of 2% lidocaine and 80mg of Depo-Medrol were injected. The needle was removed from the patient. The patient was then turned back into the supine position on the stretcher and was taken to the Recovery Room in stable condition.     Estimated Blood Loss:  none     Specimens: None       Drains: None          Complications:  None    Signed By: San Hodgkins, MD                        November 9, 2021

## 2021-11-09 NOTE — H&P
Procedural Case Note    11/9/2021    (1:15 PM)    Tamia Sanders    1945   (78 y.o.)    [id number]    CC:  pain    ROS:   Complete ROS obtained, no CP, no SOB, no N or V    PMH:     Past Medical History:   Diagnosis Date    Adverse effect of anesthesia     combative with anesthesia    Arthritis     Cancer ([facility] [address]     tumor on nose, removed, pt not sure what type    Fibromyalgia     Heart failure ([facility] [address]     Hypertension     Sinus bradycardia     [place]    Stage 3 chronic kidney disease ([facility] [address]     seemarcela Peña       ALLERGIES:   No Known Allergies    MEDS:     Current Facility-Administered Medications   Medication Dose Route Frequency    methylPREDNISolone acetate (DEPO-MEDROL) 40 mg/mL injection 80 mg  80 mg Intra artICUlar ONCE    bupivacaine (PF) (MARCAINE) 0.5 % (5 mg/mL) injection 50 mg  10 mL Intra artICUlar ONCE    lidocaine (XYLOCAINE) 20 mg/mL (2 %) injection 200 mg  10 mL Other ONCE    iohexoL (OMNIPAQUE) 180 mg iodine/mL injection 10 mL  10 mL Intra artICUlar ONCE          Visit Vitals  BP (!) 162/98 (BP 1 Location: Right upper arm, BP Patient Position: At rest)   Pulse 81   Temp 97.6 °F (36.4 °C)   Resp 18   Ht 5' (1.524 m)   Wt 109.3 kg (241 lb)   SpO2 99%   BMI 47.07 kg/m²     PE:  Gen: NAD  Head: normocephalic  Heart: RRR  Lungs: CTA raymond  Abd: NT, ND, soft  Neuro: awake and alert  Skin: intact    IMPRESSION:   LS DDD    Note:  The clinical status was discussed in detail with the patient. The procedure was again discussed and described in detail. All understand and accept the planned procedure and risks; reject other forms of treatment. All questions are answered.     Baldomero Castano MD

## 2021-11-09 NOTE — PERIOP NOTES
Carrie Hdz  1945  [id number]    Situation:  Verbal report given from: BRIANNE Anna RN  Procedure: Procedure(s):  LEFT L4 AND L5 TRANSFORAMINAL EPIDURAL STEROID INJECTION WITH LOCAL    Background:    Preoperative diagnosis: Degeneration, intervertebral disc, lumbar [M51.36]    Postoperative diagnosis: Degeneration, intervertebral disc, lumbar [M51.36]    :  Dr. Lois Arellano:  Intra-procedure medications, procedure, and allergies reviewed        Recommendation:    Discharge patient home after discharge instructions reviewed with patient. Rest until local has worn off.

## 2021-11-09 NOTE — PERIOP NOTES
Pt has strong and equal plantar and dorsi flexion. Pt able to stand and hold weight. No swelling or bleeding at injection sites. D/c instructions reviewed, all questions answered. Reviewed when to call the doctor and activity. 1348-Transported via w/c to awaiting transportation.

## 2021-11-09 NOTE — DISCHARGE INSTRUCTIONS
Dr. Rayshawn Strong Discharge Instructions  Transforaminal Epidural Steroid Injection/ Selective Nerve Block    You had a transforaminal epidural steroid injection/ selective nerve block today. You will probably have some numbness, and possibly weakness, in your leg for the next 6 to 8 hours. The steroids will slowly become effective, reducing your pain, over the next 2 weeks. You should begin feeling better after a few days, but it may take up to 2 weeks to notice the difference. The benefit you get from your injection will last a variable amount of time, depending on the severity of your lumbar spine problem.  Pain: Most people do not have any increase in pain after this injection. However, you might experience some soreness in your low back. If this happens, putting an ice pack over the sore area will help.  Bandage: You will have a small bandage covering the site of the injection. You may remove it once you get home.  Restrictions: Someone should drive you home after the injection. After that, you have no restrictions. You need to be careful while walking, as you may still have some numbness or weakness in your leg. You may resume your normal level of activity. You may take a shower or bath, and you may eat normally. You should continue your current exercises and/or therapy routine.   Medications: Continue your current medications as prescribed. If your pain decreases, you may reduce the amount of your pain medicines. If you stopped taking anticoagulants or blood-thinners before the injection, start them tomorrow. If you have diabetes, your blood sugar may be elevated for a few days. Call your primary doctor with any questions.   Call Dr. Rayshawn Strong at [phone] if you experience:   Fever (101 degrees Fahrenheit or greater)   Nausea or vomiting   Headache unrelieved by your normal pain medicine   Redness or swelling at the injection site that lasts more than 1 day   New numbness, tingling, weakness, or pain that you didnt have before the injection    Follow-up appointment:   If still having pain in 1-2 weeks, call office at [phone] for a follow up appointment. DISCHARGE SUMMARY from Nurse    The following personal items collected during your admission are returned to you:   Dental Appliance: Dental Appliances: None  Vision: Visual Aid: Glasses  Hearing Aid:    Jewelry:    Clothing:    Other Valuables:    Valuables sent to safe: If you were given prescriptions, please review the written information on prescribed medications. · You will receive a Post Operative Call from one of the Recovery Room Nurses on the day after your surgery to check on you. It is very important for us to know how you are recovering after your surgery. · You may receive an e-mail or letter in the mail from [employer] regarding your experience with us in the Ambulatory Surgery Unit. Your feedback is valuable to us and we appreciate your participation in the survey. If you have not had your influenza or pneumococcal vaccines, please follow up with your primary care physician. The discharge information has been reviewed with the patient. The patient verbalized understanding.

## 2021-11-09 NOTE — PERIOP NOTES
Patient: Abad Jenkins MRN: [id number]  SSN: [id number]   YOB: 1945  Age: 78 y.o. Sex: female     Patient is status post Procedure(s):  LEFT L4 AND L5 TRANSFORAMINAL EPIDURAL STEROID INJECTION WITH LOCAL.     Surgeon(s) and Role:     * Peterson Cruz MD - Primary    Local/Dose/Irrigation:  SEE MAR                                         Dressing/Packing:     Quinton Colin

## 2021-12-29 RX ORDER — CHOLECALCIFEROL (VITAMIN D3) 125 MCG
2000 CAPSULE ORAL DAILY
COMMUNITY
End: 2022-09-08

## 2021-12-29 RX ORDER — GLUCOSAMINE/CHONDR SU A SOD 750-600 MG
5000 TABLET ORAL DAILY
COMMUNITY

## 2021-12-29 NOTE — PERIOP NOTES
[facility]  Ambulatory Surgery Unit  Pre-operative Instructions    Procedure Date  Tuesday January 11th            Tentative Arrival Time 12:00 noon      1. On the day of your procedure, please report to the Ambulatory Surgery Unit Registration Desk and sign in at your designated time. The Ambulatory Surgery Unit is located in [facility] on the [facility] side of the [facility] across from the [address] [place] [address]. Please have all of your health insurance cards and a photo ID. 2. You must have someone with you to drive you home as directed by your surgeon. 3. You may have a light breakfast and take normal morning medications. 4. We recommend you do not drink any alcoholic beverages for 24 hours before and after your procedure. 5. Contact your surgeons office for instructions on the following medications: non-steroidal anti-inflammatory drugs (i.e. Advil, Aleve), vitamins, and supplements. (Some surgeons will want you to stop these medications prior to surgery and others may allow you to take them)   **If you are currently taking Plavix, Coumadin, Aspirin and/or other blood-thinning agents, contact your surgeon for instructions. ** Your surgeon will partner with the physician prescribing these medications to determine if it is safe to stop or if you need to continue taking. Please do not stop taking these medications without instructions from your surgeon. 6. In an effort to help prevent surgical site infection, we ask that you shower with an anti-bacterial soap (i.e. Dial or Safeguard) on the morning of your procedure. Do not apply any lotions, powders, or deodorants after showering. 7. Wear comfortable clothes. Wear glasses instead of contacts. Do not bring any jewelry or money (other than copays or fees as instructed). Do not wear make-up, particularly mascara, the morning of your procedure. Wear your hair loose or down, no ponytails, buns, chelly pins or clips.  All body piercings must be removed. 8. You should understand that if you do not follow these instructions your procedure may be cancelled. If your physical condition changes (i.e. fever, cold or flu) please contact your surgeon as soon as possible. 9. It is important that you be on time. If a situation occurs where you may be late, or if you have any questions or problems, please call [phone].    10. Your procedure time may be subject to change. You will receive a phone call the day prior to confirm your arrival time. I understand a pre-operative phone call will be made to verify my procedure time. In the event that I am not available, I give permission for a message to be left on my answering service and/or with another person?       Yes    Reviewed instructions via telephone/patient verbalized understanding         ___________________      ___________________      ___________________  (Signature of Patient)          (Witness)                   (Date and Time)

## 2022-01-11 ENCOUNTER — HOSPITAL ENCOUNTER (OUTPATIENT)
Age: 77
Setting detail: OUTPATIENT SURGERY
Discharge: HOME | End: 2022-01-11
Attending: PHYSICAL MEDICINE & REHABILITATION | Admitting: PHYSICAL MEDICINE & REHABILITATION
Payer: MEDICARE

## 2022-01-11 ENCOUNTER — APPOINTMENT (OUTPATIENT)
Dept: GENERAL RADIOLOGY | Age: 77
End: 2022-01-11
Attending: PHYSICAL MEDICINE & REHABILITATION
Payer: MEDICARE

## 2022-01-11 VITALS
HEART RATE: 66 BPM | OXYGEN SATURATION: 100 % | RESPIRATION RATE: 18 BRPM | BODY MASS INDEX: 48.88 KG/M2 | HEIGHT: 60 IN | SYSTOLIC BLOOD PRESSURE: 179 MMHG | TEMPERATURE: 97.7 F | WEIGHT: 249 LBS | DIASTOLIC BLOOD PRESSURE: 79 MMHG

## 2022-01-11 PROCEDURE — 76000 FLUOROSCOPY <1 HR PHYS/QHP: CPT

## 2022-01-11 PROCEDURE — 74011250636 HC RX REV CODE- 250/636: Performed by: PHYSICAL MEDICINE & REHABILITATION

## 2022-01-11 PROCEDURE — 2709999900 HC NON-CHARGEABLE SUPPLY: Performed by: PHYSICAL MEDICINE & REHABILITATION

## 2022-01-11 PROCEDURE — 72020 X-RAY EXAM OF SPINE 1 VIEW: CPT

## 2022-01-11 PROCEDURE — 74011000636 HC RX REV CODE- 636: Performed by: PHYSICAL MEDICINE & REHABILITATION

## 2022-01-11 PROCEDURE — 76030000002 HC AMB SURG OR TIME FIRST 0.: Performed by: PHYSICAL MEDICINE & REHABILITATION

## 2022-01-11 PROCEDURE — 74011000250 HC RX REV CODE- 250: Performed by: PHYSICAL MEDICINE & REHABILITATION

## 2022-01-11 PROCEDURE — 76210000046 HC AMBSU PH II REC FIRST 0.5 HR: Performed by: PHYSICAL MEDICINE & REHABILITATION

## 2022-01-11 RX ORDER — BUPIVACAINE HYDROCHLORIDE 5 MG/ML
10 INJECTION, SOLUTION EPIDURAL; INTRACAUDAL ONCE
Status: COMPLETED | OUTPATIENT
Start: 2022-01-11 | End: 2022-01-11

## 2022-01-11 RX ORDER — LIDOCAINE HYDROCHLORIDE 20 MG/ML
10 INJECTION, SOLUTION INFILTRATION; PERINEURAL ONCE
Status: COMPLETED | OUTPATIENT
Start: 2022-01-11 | End: 2022-01-11

## 2022-01-11 RX ORDER — DEXAMETHASONE SODIUM PHOSPHATE 4 MG/ML
6 INJECTION, SOLUTION INTRA-ARTICULAR; INTRALESIONAL; INTRAMUSCULAR; INTRAVENOUS; SOFT TISSUE ONCE
Status: COMPLETED | OUTPATIENT
Start: 2022-01-11 | End: 2022-01-11

## 2022-01-11 NOTE — H&P
Procedural Case Note    1/11/2022    (10:47 AM)    Al aCn    1945   (78 y.o.)    [id number]    CC:  pain    ROS:   Complete ROS obtained, no CP, no SOB, no N or V    PMH:     Past Medical History:   Diagnosis Date    Adverse effect of anesthesia     combative with anesthesia    Arthritis     Cancer 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 [address]     Hypertension     Sinus bradycardia     cardiologist in [address] chronic kidney disease ([facility] [address]     seemarcela Peña       ALLERGIES:   No Known Allergies    MEDS:     No current facility-administered medications for this encounter. Visit Vitals  Ht 5' (1.524 m)   Wt 108.9 kg (240 lb)   BMI 46.87 kg/m²     PE:  Gen: NAD  Head: normocephalic  Heart: RRR  Lungs: CTA raymond  Abd: NT, ND, soft  Neuro: awake and alert  Skin: intact    IMPRESSION:   LS DJD    Note:  The clinical status was discussed in detail with the patient. The procedure was again discussed and described in detail. All understand and accept the planned procedure and risks; reject other forms of treatment. All questions are answered.     Humble Maier MD

## 2022-01-11 NOTE — DISCHARGE INSTRUCTIONS
Discharge Instructions  Lumbar Facet Block/ Medial Branch Block  You had a lumbar facet injection today. You will probably have some numbness in your low back area for the next 6 to 8 hours. The steroids will slowly become effective, reducing your pain, over the next 2 weeks. You should begin feeling better after a few days, but it may take up to 2 weeks to notice the difference. The benefit you get from your injection will last a variable amount of time, depending on the severity of your lumbar spine problem. If the results you experience are significant, but not long lasting, you may be a candidate for a procedure that can be longer lasting (radiofrequency ablation of the nerves innervating the facet joints).  Pain:  Most people do not have any increase in pain after this injection. However, you might experience some soreness at the site of the injection. If this happens, putting an ice pack over the sore area will help.  Bandage: You have a small bandage covering the site of the injection. You may remove it once you get home.  Restrictions:  Someone should drive you home after the injection. After that, you have no restrictions. You may resume your normal level of activity. You may take a shower or bath, and you may eat normally. You should continue your current exercises and/or therapy routine.  Medications:  Continue your current medications as prescribed. If your pain decreases, you may reduce the amount of your pain medicines. If you stopped taking anticoagulants or blood-thinners before the injection, start them tomorrow. If you have diabetes, your blood sugar may be elevated for a few days. Call your primary doctor with any questions.   Call Dr. Renee Harding at [phone] if you experience:   Fever (101 degrees Fahrenheit or greater)   Nausea or vomiting   Headache unrelieved by your normal pain medicine   Redness or swelling at the injection site that lasts more than 1 day   New numbness, tingling, weakness, or pain that you didnt have before the injection. If still having pain in 1-2 weeks, call office at [phone] for a follow up appointment. DISCHARGE SUMMARY from Nurse    The following personal items collected during your admission are returned to you:   Dental Appliance: Dental Appliances: None  Vision: Visual Aid: Glasses  Hearing Aid:    Jewelry:    Clothing: Clothing: With patient  Other Valuables: Other Valuables: Eyeglasses  Valuables sent to safe: If you were given prescriptions, please review the written information on prescribed medications. · You will receive a Post Operative Call from one of the Recovery Room Nurses on the day after your surgery to check on you. It is very important for us to know how you are recovering after your surgery. · You may receive an e-mail or letter in the mail from [employer] regarding your experience with us in the Ambulatory Surgery Unit. Your feedback is valuable to us and we appreciate your participation in the survey. If you have not had your influenza or pneumococcal vaccines, please follow up with your primary care physician. The discharge information has been reviewed with the patient. The patient verbalized understanding.

## 2022-01-11 NOTE — OP NOTES
Facet Medial Branch Block Injection Operative Report    Indications: This is a 78 y.o. female who presents with LBP. She was positive for LS DJD. The patient was admitted for surgery as conservative measures have failed. Date of Surgery: 1/11/2022    Preoperative Diagnosis: LS DJD    Postoperative Diagnosis: LS DJD    Surgeon(s) and Role:     * Rockne Schirmer, MD - Primary     Procedure:  Procedure(s):  BILATERAL L4-5, L5-S1 MEDIAL BRANCH BLOCK WITH LOCAL    Procedure in Detail:  After appropriate informed consent was obtained, the patient was taken to the operating suite and placed in the prone position on the operating table on appropriate padding. The LS region was prepped and draped in the usual sterile fashion. Intraoperative fluoroscopy was used to localize the LS spine. The skin was infiltrated with 2% lidocaine. A 25-g needle was advanced into the Jose L3,4,5 Medial Branches under fluoroscopic guidance. Needle placement confirmed with contrast.    Next, 4ml of 0.5% marcaine and 10mg of Dexamethasone were injected. The needle was removed from the patient. The patient was then turned back into the supine position on the stretcher and was taken to the Recovery Room in stable condition.     Estimated Blood Loss:  none     Specimens: None       Drains: None          Complications:  None    Signed By: Viviane Liu MD                        January 11, 2022

## 2022-01-11 NOTE — PERIOP NOTES
Emilee Spatz  1945  [id number]    Situation:  Verbal report given from: Camden Dewey RN  Procedure: Procedure(s):  BILATERAL L4-5, L5-S1 MEDIAL BRANCH BLOCK WITH LOCAL    Background:    Preoperative diagnosis: LUMBAR SPONDYLOSIS    Postoperative diagnosis: LUMBAR SPONDYLOSIS    :  Dr. Pily Reynaga    Assistant(s): Circ-1: Ulices Betts RN  Circ-2: Adal Coronado RN  Surg Asst-1: Minda Sun    Specimens: * No specimens in log *    Assessment:  Intra-procedure medications         Anesthesia gave intra-procedure sedation and medications, see anesthesia flow sheet     Intravenous fluids: LR@ KVO     Vital signs stable

## 2022-01-11 NOTE — OP NOTES
Procedural Case Note    1/11/2022    (11:04 AM)    Henry Rucker    1945   (78 y.o.)    [id number]    CC:  pain    ROS:   Complete ROS obtained, no CP, no SOB, no N or V    PMH:     Past Medical History:   Diagnosis Date    Adverse effect of anesthesia     combative with anesthesia    Arthritis     Cancer ([facility] [address]     tumor on nose, removed, pt not sure what type    Fibromyalgia     Heart failure ([facility] [address]     Hypertension     Sinus bradycardia     cardiologist in [address] chronic kidney disease ([facility] [address]     sees Peña       ALLERGIES:   No Known Allergies    MEDS:     No current facility-administered medications for this encounter. Visit Vitals  Ht 5' (1.524 m)   Wt 108.9 kg (240 lb)   BMI 46.87 kg/m²     PE:  Gen: NAD  Head: normocephalic  Heart: RRR  Lungs: CTA raymond  Abd: NT, ND, soft  Neuro: awake and alert  Skin: intact    IMPRESSION:   LS DJD    Note:  The clinical status was discussed in detail with the patient. The procedure was again discussed and described in detail. All understand and accept the planned procedure and risks; reject other forms of treatment. All questions are answered.     Damian Kehr, MD

## 2022-01-11 NOTE — PERIOP NOTES
Patient received to PACU, VSS. Patient awake and alert with no complaints of pain. Injection site intact. Neuro:  Push/Pull assessment:     LLE Response: strong push/pull   RLE Response: strong push/pull    Discharge instructions given. Patient verbalized understanding of instructions and follow up. Patient BP elevated on arrival (171/78) and in PACU (179/79), Patient instructed to check BP at home and follow up with PCP if continues to be elevated. Patient states ready for discharge - patient discharged at this time by wheelchair with all belongings. Daughter in [facility] to provide transportation home.

## 2022-02-25 RX ORDER — LORAZEPAM 1 MG/1
1 TABLET ORAL ONCE
COMMUNITY
End: 2022-09-08

## 2022-03-08 ENCOUNTER — APPOINTMENT (OUTPATIENT)
Dept: GENERAL RADIOLOGY | Age: 77
End: 2022-03-08
Attending: PHYSICAL MEDICINE & REHABILITATION
Payer: MEDICARE

## 2022-03-08 ENCOUNTER — HOSPITAL ENCOUNTER (OUTPATIENT)
Age: 77
Setting detail: OUTPATIENT SURGERY
Discharge: HOME | End: 2022-03-08
Attending: PHYSICAL MEDICINE & REHABILITATION | Admitting: PHYSICAL MEDICINE & REHABILITATION
Payer: MEDICARE

## 2022-03-08 VITALS
HEART RATE: 99 BPM | RESPIRATION RATE: 14 BRPM | OXYGEN SATURATION: 99 % | TEMPERATURE: 97.8 F | HEIGHT: 60 IN | WEIGHT: 250 LBS | BODY MASS INDEX: 49.08 KG/M2 | SYSTOLIC BLOOD PRESSURE: 139 MMHG | DIASTOLIC BLOOD PRESSURE: 90 MMHG

## 2022-03-08 PROCEDURE — 74011250636 HC RX REV CODE- 250/636: Performed by: PHYSICAL MEDICINE & REHABILITATION

## 2022-03-08 PROCEDURE — 76210000046 HC AMBSU PH II REC FIRST 0.5 HR: Performed by: PHYSICAL MEDICINE & REHABILITATION

## 2022-03-08 PROCEDURE — 76030000002 HC AMB SURG OR TIME FIRST 0.: Performed by: PHYSICAL MEDICINE & REHABILITATION

## 2022-03-08 PROCEDURE — 77030003665 HC NDL SPN BBMI -A: Performed by: PHYSICAL MEDICINE & REHABILITATION

## 2022-03-08 PROCEDURE — 76000 FLUOROSCOPY <1 HR PHYS/QHP: CPT

## 2022-03-08 PROCEDURE — 77030013367: Performed by: PHYSICAL MEDICINE & REHABILITATION

## 2022-03-08 PROCEDURE — 74011000250 HC RX REV CODE- 250: Performed by: PHYSICAL MEDICINE & REHABILITATION

## 2022-03-08 PROCEDURE — 2709999900 HC NON-CHARGEABLE SUPPLY: Performed by: PHYSICAL MEDICINE & REHABILITATION

## 2022-03-08 RX ORDER — BUPIVACAINE HYDROCHLORIDE 5 MG/ML
10 INJECTION, SOLUTION EPIDURAL; INTRACAUDAL ONCE
Status: COMPLETED | OUTPATIENT
Start: 2022-03-08 | End: 2022-03-08

## 2022-03-08 RX ORDER — DEXAMETHASONE SODIUM PHOSPHATE 4 MG/ML
6 INJECTION, SOLUTION INTRA-ARTICULAR; INTRALESIONAL; INTRAMUSCULAR; INTRAVENOUS; SOFT TISSUE ONCE
Status: COMPLETED | OUTPATIENT
Start: 2022-03-08 | End: 2022-03-08

## 2022-03-08 RX ORDER — LIDOCAINE HYDROCHLORIDE 20 MG/ML
10 INJECTION, SOLUTION INFILTRATION; PERINEURAL ONCE
Status: COMPLETED | OUTPATIENT
Start: 2022-03-08 | End: 2022-03-08

## 2022-03-08 NOTE — H&P
Procedural Case Note    3/8/2022    (2:30 PM)    Donavan Painter    1945   (78 y.o.)    [id number]    CC:  pain    ROS:   Complete ROS obtained, no CP, no SOB, no N or V    PMH:     Past Medical History:   Diagnosis Date    Adverse effect of anesthesia     combative with anesthesia    Arthritis     Cancer ([facility] [address]     tumor on nose, removed, pt not sure what type    Fibromyalgia     Heart failure ([facility] [address]     Hypertension     Sinus bradycardia     cardiologist in [address] chronic kidney disease ([facility] [address]     sees Peña       ALLERGIES:     Allergies   Allergen Reactions    Ambien [Zolpidem] Other (comments)     Hallucinations     Verapamil Cough and Other (comments)       MEDS:     No current facility-administered medications for this encounter. Visit Vitals  BP (!) 174/84 (BP 1 Location: Right arm, BP Patient Position: At rest)   Pulse 94   Temp 97.7 °F (36.5 °C)   Resp 17   Ht 5' (1.524 m)   Wt 113.4 kg (250 lb)   SpO2 99%   BMI 48.82 kg/m²     PE:  Gen: NAD  Head: normocephalic  Heart: RRR  Lungs: CTA raymond  Abd: NT, ND, soft  Neuro: awake and alert  Skin: intact    IMPRESSION:   Lumbar spondylosis    Note:  The clinical status was discussed in detail with the patient. The procedure was again discussed and described in detail. All understand and accept the planned procedure and risks; reject other forms of treatment. All questions are answered.     Rosanna Cooper MD

## 2022-03-08 NOTE — OP NOTES
PROCEDURES PERFORMED:   1. Radiofrequency neuroablation of the medial branches Left L3, L4 and L5    PREPROCEDURE DIAGNOSIS: lumbar spondylosis with chronic back pain. POST PROCEDURE DIAGNOSIS: lumbar spondylosis with chronic back pain. SURGEON: Renetta Oconnor M.D. HISTORY OF PRESENT ILLNESS AND INDICATIONS FOR THE PROCEDURE: This patient was previously given diagnostic successful blocks of the facet joints (over 80% pain relief) innervated by the aforementioned medial branches and is here today for ablation and neurolysis of those branches. She has previously failed conservative management which has proceeded to injection therapy. FINDINGS AND PROCEDURES:  Adequate informed consent was obtained and the patient was taken to the procedure room and placed in the prone position on the procedure table. A time out was held for patient verification. The patient had monitoring throughout the procedure at cycled one minute blood pressure, pulse, and pulse oximetry. The skin was prepped and draped in the normal sterile fashion. Using fluoroscopic guidance in anteroposterior, oblique, and lateral views, the appropriate superior articular processes and pedicles were identified. After identification 1 percent lidocaine skin anesthesia was administered at each site. Using a radiofrequency ablation needle, a 22 gauge 10 millimeter active tip was placed at the medial branch nerves, Left L3, L4 and L5. The initial impedance in Ohms was within acceptable limits. Each level was subsequently tested on sensory at 50 megahertz with no referred pattern of pain noted at any level. Each level was also tested at 2 Hertz with no distal motor movement or referred pain pattern. The lesion site was injected with 0.5 milliliters of 0.5 percent bupivacaine anesthesia of the nerve branch. Next a lesion was applied for 90 seconds at 80 degrees. Permanent fluoroscopic images were saved in the patient's record.     The patient tolerated the procedure well with no apparent complications. The patient was taken to the recovery area for further monitoring. Post procedure neurologic examination was consistent with preprocedure examination. The patient was discharged home ambulatory with family and was given post procedure instructions. FOLLOW-UP: The patient will have a follow-up appointment scheduled by the clinic in the coming weeks.

## 2022-03-08 NOTE — PERIOP NOTES
Donavan Painter  1945  [id number]    Situation:  Verbal report given from: RAUL Olmstead RN  Procedure: Procedure(s):  LEFT L4-5, L5-S1 RADIO FREQUENCY ABLATION WITH LOCAL    Background:    Preoperative diagnosis: Lumbar spondylosis [M47.816]    Postoperative diagnosis: Lumbar spondylosis [I49.239]    :  Dr. Lopez Torres:  Intra-procedure medications, procedure, and allergies reviewed        Recommendation:    Discharge patient home after discharge instructions reviewed with patient. Rest until local has worn off.

## 2022-03-08 NOTE — DISCHARGE INSTRUCTIONS
Radiofrequency Ablation  Discharge Instructions    You had a radiofrequency ablation today. You will probably have some numbness in your lower back area for the next 6-8 hours. You should begin feeling better after a few days, but it may take up to 2 weeks to notice the difference. The benefit you get from your injection will last a variable amount of time, depending on the severity of your spine problem. · Pain:  Most people do not have any increase in pain after this injection. However, you might experience some soreness a few days at the site of the injection. If this happens, putting an ice pack over the sore area will help. · Bandage: You have a small bandage covering the site of the injection. You may remove it when you get home. · Restrictions:  Some one should drive you home after the injection. After that, you have no restrictions. You may resume your normal level of activity. You may take a shower or bath, and you may eat normally. You should continue your current exercise and/or therapy routine. Medications:  Continue your current medications as prescribed. If you pain decreases, you may reduce the amount of your pain medications. If you stopped taking anticoagulants or blood-thinners before the injection, start them tomorrow. Call Dr. Mable Santos at [phone] if you experience:    · Fever (101 degrees Fahrenheit or greater)  · Nausea or vomiting  · Headache unrelieved by your normal pain medication  · Redness or swelling at the injection site that lasts more than 1 day  · New numbness, tingling, weakness, or pain that you didn't have before the injection      If still having pain in 1-2 weeks, call office at [phone] for a follow up appointment.         DISCHARGE SUMMARY from Nurse    The following personal items collected during your admission are returned to you:   Dental Appliance: Dental Appliances: None  Vision: Visual Aid: Glasses  Hearing Aid:    Jewelry:    Clothing: Other Valuables:    Valuables sent to safe: If you were given prescriptions, please review the written information on prescribed medications. · You will receive a Post Operative Call from one of the Recovery Room Nurses on the day after your surgery to check on you. It is very important for us to know how you are recovering after your surgery. · You may receive an e-mail or letter in the mail from [employer] regarding your experience with us in the Ambulatory Surgery Unit. Your feedback is valuable to us and we appreciate your participation in the survey. If you have not had your influenza or pneumococcal vaccines, please follow up with your primary care physician. The discharge information has been reviewed with the patient. The patient verbalized understanding.

## 2022-03-08 NOTE — PERIOP NOTES
Pt with strong bilateral dorsi and plantar flexion, injection site without drainage. Ambulates to chair without c/o pain or weakness. Pt discharged via wheelchair, accompanied by RN. Pt discharged awake and alert, respirations equal and unlabored, skin warm, dry, and intact. Pt and family members' questions and concerns addressed prior to discharge.

## 2022-03-10 NOTE — PERIOP NOTES
[facility]  Ambulatory Surgery Unit  Pre-operative Instructions    Procedure Date  Tuesday 3/22/2022            Tentative Arrival Time 1245 pm      1. On the day of your procedure, please report to the Ambulatory Surgery Unit Registration Desk and sign in at your designated time. The Ambulatory Surgery Unit is located in [facility] on the [facility] side of the [facility] across from the [address] [place] [address]. Please have all of your health insurance cards and a photo ID. **Due to current COVID restrictions, only ONE adult may accompany you the day of the procedure. We have limited seating available. If our waiting room is at capacity, your ride may be asked to remain in their vehicle. No children are allowed in the waiting room. 2. You must have someone with you to drive you home as directed by your surgeon. 3. Nothing to eat or drink after midnight    4. We recommend you do not drink any alcoholic beverages for 24 hours before and after your procedure. 5. Contact your surgeons office for instructions on the following medications: non-steroidal anti-inflammatory drugs (i.e. Advil, Aleve), vitamins, and supplements. (Some surgeons will want you to stop these medications prior to surgery and others may allow you to take them)   **If you are currently taking Plavix, Coumadin, Aspirin and/or other blood-thinning agents, contact your surgeon for instructions. ** Your surgeon will partner with the physician prescribing these medications to determine if it is safe to stop or if you need to continue taking. Please do not stop taking these medications without instructions from your surgeon. 6. In an effort to help prevent surgical site infection, we ask that you shower with an anti-bacterial soap (i.e. Dial or Safeguard) on the morning of your procedure. Do not apply any lotions, powders, or deodorants after showering. 7. Wear comfortable clothes. Wear glasses instead of contacts. Do not bring any jewelry or money (other than copays or fees as instructed). Do not wear make-up, particularly mascara, the morning of your procedure. Wear your hair loose or down, no ponytails, buns, chelly pins or clips. All body piercings must be removed. 8. You should understand that if you do not follow these instructions your procedure may be cancelled. If your physical condition changes (i.e. fever, cold or flu) please contact your surgeon as soon as possible. 9. It is important that you be on time. If a situation occurs where you may be late, or if you have any questions or problems, please call [phone].    10. Your procedure time may be subject to change. You will receive a phone call the day prior to confirm your arrival time. I understand a pre-operative phone call will be made to verify my procedure time. In the event that I am not available, I give permission for a message to be left on my answering service and/or with another person?       yes    Reviewed instructions via telephone patient verbalized understanding       ___________________      ___________________      ___________________  (Signature of Patient)          (Witness)                   (Date and Time)

## 2022-03-18 PROBLEM — M79.7 FIBROMYALGIA: Status: ACTIVE | Noted: 2017-04-18

## 2022-03-18 PROBLEM — E66.01 OBESITY, MORBID (HCC): Status: ACTIVE | Noted: 2017-12-19

## 2022-03-18 PROBLEM — I10 ESSENTIAL HYPERTENSION: Status: ACTIVE | Noted: 2017-04-18

## 2022-03-18 PROBLEM — H57.02 ANISOCORIA: Status: ACTIVE | Noted: 2017-04-18

## 2022-03-19 PROBLEM — N17.9 AKI (ACUTE KIDNEY INJURY) (HCC): Status: ACTIVE | Noted: 2021-03-23

## 2022-03-19 PROBLEM — I95.9 HYPOTENSION (ARTERIAL): Status: ACTIVE | Noted: 2021-03-24

## 2022-03-21 NOTE — PERIOP NOTES
Patient called to confirm arrival time for tomorrows procedure.  Confirmed with patient per pre op note for arrival time at 1:15pm, patient verbalized understanding

## 2022-03-22 ENCOUNTER — HOSPITAL ENCOUNTER (OUTPATIENT)
Age: 77
Setting detail: OUTPATIENT SURGERY
Discharge: HOME | End: 2022-03-22
Attending: PHYSICAL MEDICINE & REHABILITATION | Admitting: PHYSICAL MEDICINE & REHABILITATION
Payer: MEDICARE

## 2022-03-22 ENCOUNTER — APPOINTMENT (OUTPATIENT)
Dept: GENERAL RADIOLOGY | Age: 77
End: 2022-03-22
Attending: PHYSICAL MEDICINE & REHABILITATION
Payer: MEDICARE

## 2022-03-22 VITALS
SYSTOLIC BLOOD PRESSURE: 150 MMHG | BODY MASS INDEX: 49.79 KG/M2 | DIASTOLIC BLOOD PRESSURE: 69 MMHG | TEMPERATURE: 97.4 F | HEART RATE: 60 BPM | OXYGEN SATURATION: 100 % | WEIGHT: 253.6 LBS | HEIGHT: 60 IN | RESPIRATION RATE: 16 BRPM

## 2022-03-22 PROCEDURE — 72020 X-RAY EXAM OF SPINE 1 VIEW: CPT

## 2022-03-22 PROCEDURE — 76210000046 HC AMBSU PH II REC FIRST 0.5 HR: Performed by: PHYSICAL MEDICINE & REHABILITATION

## 2022-03-22 PROCEDURE — 2709999900 HC NON-CHARGEABLE SUPPLY: Performed by: PHYSICAL MEDICINE & REHABILITATION

## 2022-03-22 PROCEDURE — 74011000250 HC RX REV CODE- 250: Performed by: PHYSICAL MEDICINE & REHABILITATION

## 2022-03-22 PROCEDURE — 74011250636 HC RX REV CODE- 250/636: Performed by: PHYSICAL MEDICINE & REHABILITATION

## 2022-03-22 PROCEDURE — 76000 FLUOROSCOPY <1 HR PHYS/QHP: CPT

## 2022-03-22 PROCEDURE — 76030000002 HC AMB SURG OR TIME FIRST 0.: Performed by: PHYSICAL MEDICINE & REHABILITATION

## 2022-03-22 RX ORDER — BUPIVACAINE HYDROCHLORIDE 5 MG/ML
5 INJECTION, SOLUTION EPIDURAL; INTRACAUDAL ONCE
Status: COMPLETED | OUTPATIENT
Start: 2022-03-22 | End: 2022-03-22

## 2022-03-22 RX ORDER — DEXAMETHASONE SODIUM PHOSPHATE 4 MG/ML
6 INJECTION, SOLUTION INTRA-ARTICULAR; INTRALESIONAL; INTRAMUSCULAR; INTRAVENOUS; SOFT TISSUE ONCE
Status: COMPLETED | OUTPATIENT
Start: 2022-03-22 | End: 2022-03-22

## 2022-03-22 RX ORDER — LIDOCAINE HYDROCHLORIDE 20 MG/ML
5 INJECTION, SOLUTION INFILTRATION; PERINEURAL ONCE
Status: COMPLETED | OUTPATIENT
Start: 2022-03-22 | End: 2022-03-22

## 2022-03-22 NOTE — PERIOP NOTES
Noe Taylor  1945  [id number]    Situation:  Verbal report given from: Perri Colorado RN  Procedure: Procedure(s):  RIGHT L4-5, L5-S1 RADIO FREQUENCY ABLATION    Background:    Preoperative diagnosis: Lumbar spondylosis [M47.816]    Postoperative diagnosis: Lumbar spondylosis [M47.816]    :  Dr. Devante Patelt:  Intra-procedure medications, procedure, and allergies reviewed        Recommendation:    Discharge patient home after discharge instructions reviewed with patient. Rest until local has worn off.

## 2022-03-22 NOTE — OP NOTES
PROCEDURES PERFORMED:   1. Radiofrequency neuroablation of the medial branches R L3, L4 and L5    PREPROCEDURE DIAGNOSIS: lumbar spondylosis with chronic back pain. POST PROCEDURE DIAGNOSIS: lumbar spondylosis with chronic back pain. SURGEON: Suzanne Zee M.D. HISTORY OF PRESENT ILLNESS AND INDICATIONS FOR THE PROCEDURE: This patient was previously given diagnostic successful blocks of the facet joints innervated by the aforementioned medial branches and is here today for ablation and neurolysis of those branches. She has previously failed conservative management which has proceeded to injection therapy. FINDINGS AND PROCEDURES:  Adequate informed consent was obtained and the patient was taken to the procedure room and placed in the prone position on the procedure table. A time out was held for patient verification. The patient had monitoring throughout the procedure at cycled one minute blood pressure, pulse, and pulse oximetry. The skin was prepped and draped in the normal sterile fashion. Using fluoroscopic guidance in anteroposterior, oblique, and lateral views, the appropriate superior articular processes and pedicles were identified. After identification 1 percent lidocaine skin anesthesia was administered at each site. Using a radiofrequency ablation needle, a 22 gauge 10 millimeter active tip was placed at the medial branch nerves, right L3, L4 and L5. The initial impedance in Ohms was within acceptable limits. Each level was subsequently tested on sensory at 50 megahertz with no referred pattern of pain noted at any level. Each level was also tested at 2 Hertz with no distal motor movement or referred pain pattern. The lesion site was injected with 0.5 milliliters of 0.5 percent bupivacaine anesthesia of the nerve branch. Next a lesion was applied for 90 seconds at 80 degrees. Permanent fluoroscopic images were saved in the patient's record.     The patient tolerated the procedure well with no apparent complications. The patient was taken to the recovery area for further monitoring. Post procedure neurologic examination was consistent with preprocedure examination. The patient was discharged home ambulatory with family and was given post procedure instructions. FOLLOW-UP: The patient will have a follow-up appointment scheduled by the clinic in the coming weeks.

## 2022-03-22 NOTE — PERIOP NOTES
Pt has strong and equal plantar and dorsi flexion. Pt able to stand and hold weight. No swelling or bleeding at injection sites. D/c instructions reviewed, all questions answered. Reviewed when to call the doctor and activity. 1513-Transported via w/c to awaiting transportation.

## 2022-03-22 NOTE — H&P
Procedural Case Note    3/22/2022    (1:12 PM)    Shira Alamo    1945   (78 y.o.)    [id number]    CC:  pain    ROS:   Complete ROS obtained, no CP, no SOB, no N or V    PMH:     Past Medical History:   Diagnosis Date    Adverse effect of anesthesia     combative with anesthesia    Arthritis     Cancer ([facility] [address]     tumor on nose, removed, pt not sure what type    Fibromyalgia     Heart failure ([facility] [address]     Hypertension     Sinus bradycardia     cardiologist in [address] chronic kidney disease ([facility] [address]     sees Peña       ALLERGIES:     Allergies   Allergen Reactions    Ambien [Zolpidem] Other (comments)     Hallucinations     Verapamil Cough and Other (comments)       MEDS:     No current facility-administered medications for this encounter. Visit Vitals  Ht 5' (1.524 m)   Wt 113.4 kg (250 lb)   BMI 48.82 kg/m²     PE:  Gen: NAD  Head: normocephalic  Heart: RRR  Lungs: CTA raymond  Abd: NT, ND, soft  Neuro: awake and alert  Skin: intact    IMPRESSION:   Lumbar spondylosis    Note:  The clinical status was discussed in detail with the patient. The procedure was again discussed and described in detail. All understand and accept the planned procedure and risks; reject other forms of treatment. All questions are answered.     Jesus Blue MD

## 2022-03-22 NOTE — DISCHARGE INSTRUCTIONS
Radiofrequency Ablation  Discharge Instructions    You had a radiofrequency ablation today. You will probably have some numbness in your lower back area for the next 6-8 hours. You should begin feeling better after a few days, but it may take up to 2 weeks to notice the difference. The benefit you get from your injection will last a variable amount of time, depending on the severity of your spine problem. · Pain:  Most people do not have any increase in pain after this injection. However, you might experience some soreness a few days at the site of the injection. If this happens, putting an ice pack over the sore area will help. · Bandage: You have a small bandage covering the site of the injection. You may remove it when you get home. · Restrictions:  Some one should drive you home after the injection. After that, you have no restrictions. You may resume your normal level of activity. You may take a shower or bath, and you may eat normally. You should continue your current exercise and/or therapy routine. Medications:  Continue your current medications as prescribed. If you pain decreases, you may reduce the amount of your pain medications. If you stopped taking anticoagulants or blood-thinners before the injection, start them tomorrow. Call Dr. Hannah Grande at [phone] if you experience:    · Fever (101 degrees Fahrenheit or greater)  · Nausea or vomiting  · Headache unrelieved by your normal pain medication  · Redness or swelling at the injection site that lasts more than 1 day  · New numbness, tingling, weakness, or pain that you didn't have before the injection      If still having pain in 1-2 weeks, call office at [phone] for a follow up appointment.         DISCHARGE SUMMARY from Nurse    The following personal items collected during your admission are returned to you:   Dental Appliance: Dental Appliances: None  Vision: Visual Aid: Glasses  Hearing Aid:    Xiao Chicas: Xiao Chicas: None  Clothing: Clothing: With patient  Other Valuables: Other Valuables: Purse  Valuables sent to safe: If you were given prescriptions, please review the written information on prescribed medications. · You will receive a Post Operative Call from one of the Recovery Room Nurses on the day after your surgery to check on you. It is very important for us to know how you are recovering after your surgery. · You may receive an e-mail or letter in the mail from [place] regarding your experience with us in the Ambulatory Surgery Unit. Your feedback is valuable to us and we appreciate your participation in the survey. If you have not had your influenza or pneumococcal vaccines, please follow up with your primary care physician. The discharge information has been reviewed with the patient. The patient verbalized understanding.

## 2022-04-09 NOTE — PATIENT INSTRUCTIONS
Healthy Upper Back: Exercises  Your Care Instructions  Here are some examples of exercises for your upper back. Start each exercise slowly. Ease off the exercise if you start to have pain. Your doctor or physical therapist will tell you when you can start these exercises and which ones will work best for you. How to do the exercises  Lower neck and upper back stretch    1. Stretch your arms out in front of your body. Clasp one hand on top of your other hand. 2. Gently reach out so that you feel your shoulder blades stretching away from each other. 3. Gently bend your head forward. 4. Hold for 15 to 30 seconds. 5. Repeat 2 to 4 times. Midback stretch    If you have knee pain, do not do this exercise. 1. Kneel on the floor, and sit back on your ankles. 2. Lean forward, place your hands on the floor, and stretch your arms out in front of you. Rest your head between your arms. 3. Gently push your chest toward the floor, reaching as far in front of you as possible. 4. Hold for 15 to 30 seconds. 5. Repeat 2 to 4 times. Shoulder rolls    1. Sit comfortably with your feet shoulder-width apart. You can also do this exercise while standing. 2. Roll your shoulders up, then back, and then down in a smooth, circular motion. 3. Repeat 2 to 4 times. Wall push-up    1. Stand against a wall with your feet about 12 to 24 inches back from the wall. If you feel any pain when you do this exercise, stand closer to the wall. 2. Place your hands on the wall slightly wider apart than your shoulders, and lean forward. 3. Gently lean your body toward the wall. Then push back to your starting position. Keep the motion smooth and controlled. 4. Repeat 8 to 12 times. Resisted shoulder blade squeeze    For this exercise, you will need elastic exercise material, such as surgical tubing or Thera-Band. 1. Sit or stand, holding the band in both hands in front of you.  Keep your elbows close to your sides, bent at a 90-degree angle. Your palms should face up. 2. Squeeze your shoulder blades together, and move your arms to the outside, stretching the band. Be sure to keep your elbows at your sides while you do this. 3. Relax. 4. Repeat 8 to 12 times. Resisted rows    For this exercise, you will need elastic exercise material, such as surgical tubing or Thera-Band. 1. Put the band around a solid object, such as a bedpost, at about waist level. Hold one end of the band in each hand. 2. With your elbows at your sides and bent to 90 degrees, pull the band back to move your shoulder blades toward each other. Return to the starting position. 3. Repeat 8 to 12 times. Follow-up care is a key part of your treatment and safety. Be sure to make and go to all appointments, and call your doctor if you are having problems. It's also a good idea to know your test results and keep a list of the medicines you take. Where can you learn more? Go to http://sarah-ingris.info/. Enter P767 in the search box to learn more about \"Healthy Upper Back: Exercises. \"  Current as of: March 21, 2017  Content Version: 11.4  © 6059-6512 [employer], [employer]. Care instructions adapted under license by [employer] (which disclaims liability or warranty for this information). If you have questions about a medical condition or this instruction, always ask your healthcare professional. [place] [address] any warranty or liability for your use of this information. yes

## 2022-05-18 RX ORDER — DICLOFENAC SODIUM 10 MG/G
4 GEL TOPICAL 4 TIMES DAILY
COMMUNITY

## 2022-05-18 RX ORDER — METAPROTERENOL SULFATE 20 MG
2 TABLET ORAL DAILY
COMMUNITY
End: 2022-09-08

## 2022-05-18 NOTE — PERIOP NOTES
[facility]  Ambulatory Surgery Unit  Pre-operative Instructions    Procedure Date  Tuesday May 31st            Tentative Arrival Time 2:30 pm      1. On the day of your procedure, please report to the Ambulatory Surgery Unit Registration Desk and sign in at your designated time. The Ambulatory Surgery Unit is located in [facility] on the [facility] side of the [facility] across from the [address] [place] [address]. Please have all of your health insurance cards and a photo ID.    **TWO adults may accompany you the day of the procedure. We have limited seating available. If our waiting room is at capacity, your ride may be asked to remain in their vehicle. No one under 15 is allowed in the waiting room. Masks, fully covering the mouth and nose, are required in the waiting room. 2. You must have someone with you to drive you home as directed by your surgeon. 3. You may have a light breakfast and take normal morning medications. 4. We recommend you do not drink any alcoholic beverages for 24 hours before and after your procedure. 5. Contact your surgeons office for instructions on the following medications: non-steroidal anti-inflammatory drugs (i.e. Advil, Aleve), vitamins, and supplements. (Some surgeons will want you to stop these medications prior to surgery and others may allow you to take them)   **If you are currently taking Plavix, Coumadin, Aspirin and/or other blood-thinning agents, contact your surgeon for instructions. ** Your surgeon will partner with the physician prescribing these medications to determine if it is safe to stop or if you need to continue taking. Please do not stop taking these medications without instructions from your surgeon. 6. In an effort to help prevent surgical site infection, we ask that you shower with an anti-bacterial soap (i.e. Dial or Safeguard) on the morning of your procedure.  Do not apply any lotions, powders, or deodorants after showering. 7. Wear comfortable clothes. Wear glasses instead of contacts. Do not bring any jewelry or money (other than copays or fees as instructed). Do not wear make-up, particularly mascara, the morning of your procedure. Wear your hair loose or down, no ponytails, buns, chelly pins or clips. All body piercings must be removed. 8. You should understand that if you do not follow these instructions your procedure may be cancelled. If your physical condition changes (i.e. fever, cold or flu) please contact your surgeon as soon as possible. 9. It is important that you be on time. If a situation occurs where you may be late, or if you have any questions or problems, please call [phone].    10. Your procedure time may be subject to change. You will receive a phone call the day prior to confirm your arrival time. I understand a pre-operative phone call will be made to verify my procedure time. In the event that I am not available, I give permission for a message to be left on my answering service and/or with another person?       Yes    Reviewed instructions via telephone, patient verbalized understanding         ___________________      ___________________      ___________________  (Signature of Patient)          (Witness)                   (Date and Time)

## 2022-05-31 ENCOUNTER — APPOINTMENT (OUTPATIENT)
Dept: GENERAL RADIOLOGY | Age: 77
End: 2022-05-31
Attending: PHYSICAL MEDICINE & REHABILITATION
Payer: MEDICARE

## 2022-05-31 ENCOUNTER — HOSPITAL ENCOUNTER (OUTPATIENT)
Age: 77
Setting detail: OUTPATIENT SURGERY
Discharge: HOME | End: 2022-05-31
Attending: PHYSICAL MEDICINE & REHABILITATION | Admitting: PHYSICAL MEDICINE & REHABILITATION
Payer: MEDICARE

## 2022-05-31 VITALS
SYSTOLIC BLOOD PRESSURE: 138 MMHG | HEIGHT: 60 IN | HEART RATE: 73 BPM | RESPIRATION RATE: 16 BRPM | WEIGHT: 253 LBS | BODY MASS INDEX: 49.67 KG/M2 | OXYGEN SATURATION: 98 % | DIASTOLIC BLOOD PRESSURE: 55 MMHG | TEMPERATURE: 97.8 F

## 2022-05-31 PROCEDURE — 76000 FLUOROSCOPY <1 HR PHYS/QHP: CPT

## 2022-05-31 PROCEDURE — 74011000250 HC RX REV CODE- 250: Performed by: PHYSICAL MEDICINE & REHABILITATION

## 2022-05-31 PROCEDURE — 74011250636 HC RX REV CODE- 250/636: Performed by: PHYSICAL MEDICINE & REHABILITATION

## 2022-05-31 PROCEDURE — 76210000046 HC AMBSU PH II REC FIRST 0.5 HR: Performed by: PHYSICAL MEDICINE & REHABILITATION

## 2022-05-31 PROCEDURE — 74011000636 HC RX REV CODE- 636: Performed by: PHYSICAL MEDICINE & REHABILITATION

## 2022-05-31 PROCEDURE — 2709999900 HC NON-CHARGEABLE SUPPLY: Performed by: PHYSICAL MEDICINE & REHABILITATION

## 2022-05-31 PROCEDURE — 76030000002 HC AMB SURG OR TIME FIRST 0.: Performed by: PHYSICAL MEDICINE & REHABILITATION

## 2022-05-31 PROCEDURE — 77030003665 HC NDL SPN BBMI -A: Performed by: PHYSICAL MEDICINE & REHABILITATION

## 2022-05-31 PROCEDURE — 72020 X-RAY EXAM OF SPINE 1 VIEW: CPT

## 2022-05-31 RX ORDER — BUPIVACAINE HYDROCHLORIDE 5 MG/ML
5 INJECTION, SOLUTION EPIDURAL; INTRACAUDAL ONCE
Status: DISCONTINUED | OUTPATIENT
Start: 2022-05-31 | End: 2022-05-31 | Stop reason: HOSPADM

## 2022-05-31 RX ORDER — LIDOCAINE HYDROCHLORIDE 20 MG/ML
5 INJECTION, SOLUTION INFILTRATION; PERINEURAL ONCE
Status: COMPLETED | OUTPATIENT
Start: 2022-05-31 | End: 2022-05-31

## 2022-05-31 RX ORDER — DEXAMETHASONE SODIUM PHOSPHATE 4 MG/ML
10 INJECTION, SOLUTION INTRA-ARTICULAR; INTRALESIONAL; INTRAMUSCULAR; INTRAVENOUS; SOFT TISSUE ONCE
Status: COMPLETED | OUTPATIENT
Start: 2022-05-31 | End: 2022-05-31

## 2022-05-31 NOTE — OP NOTES
Epidural Steroid Injection Operative Report    Indications: This is a 78 y.o. female who presents with low back pain and radiculopathy. She was positive for LS DDD with radiculopathy. The patient was admitted for spinal intervention as conservative measures have failed. Date of Surgery: 5/31/2022    Preoperative Diagnosis: LS DDD with Radiculopathy    Postoperative Diagnosis: LS DDD with Radiculopathy    Surgeon(s) and Role:     * Ruslan Purvis MD - Primary     Procedure:  Procedure(s):  LEFT L4-L5, L5-S1 TRANSFORAMINAL LUMBAR EPIDURAL STEROID INJECTION    Procedure in Detail:  After appropriate informed consent was obtained, the patient was taken to the operating suite and placed in the prone position on the operating table on appropriate padding. The LS region was prepped and draped in the usual sterile fashion. Intraoperative fluoroscopy was used to localize the LS spine. The skin was infiltrated with 2% lidocaine. A 25-g needle was advanced into the Left L4 and L5 neuroforamen under fluoroscopic guidance. A small amount of contrast was injected into the epidural space, confirming appropriate needle placement on fluoroscopy. Permanent fluoroscopic images were saved in the patient's record. Next, 2 ml of 2% lidocaine and 10 mg of Dexamethasone were injected. The needle was removed from the patient. The patient was then turned back into the supine position on the stretcher and was taken to the Recovery Room in stable condition.     Estimated Blood Loss:  none     Specimens: None       Drains: None          Complications:  None    Signed By: Austin Holt MD                        May 31, 2022

## 2022-05-31 NOTE — DISCHARGE INSTRUCTIONS
Dr. Maya Lab Discharge Instructions  Transforaminal Epidural Steroid Injection/ Selective Nerve Block    You had a transforaminal epidural steroid injection/ selective nerve block today. You will probably have some numbness, and possibly weakness, in your leg for the next 6 to 8 hours. The steroids will slowly become effective, reducing your pain, over the next 2 weeks. You should begin feeling better after a few days, but it may take up to 2 weeks to notice the difference. The benefit you get from your injection will last a variable amount of time, depending on the severity of your lumbar spine problem.  Pain: Most people do not have any increase in pain after this injection. However, you might experience some soreness in your low back. If this happens, putting an ice pack over the sore area will help.  Bandage: You will have a small bandage covering the site of the injection. You may remove it once you get home.  Restrictions: Someone should drive you home after the injection. After that, you have no restrictions. You need to be careful while walking, as you may still have some numbness or weakness in your leg. You may resume your normal level of activity. You may take a shower or bath, and you may eat normally. You should continue your current exercises and/or therapy routine.   Medications: Continue your current medications as prescribed. If your pain decreases, you may reduce the amount of your pain medicines. If you stopped taking anticoagulants or blood-thinners before the injection, start them tomorrow. If you have diabetes, your blood sugar may be elevated for a few days. Call your primary doctor with any questions.   Call Dr. Francesco Wilkins at [phone] if you experience:   Fever (101 degrees Fahrenheit or greater)   Nausea or vomiting   Headache unrelieved by your normal pain medicine   Redness or swelling at the injection site that lasts more than 1 day   New numbness, tingling, weakness, or pain that you didnt have before the injection    Follow-up appointment:   If still having pain in 1-2 weeks, call office at [phone] for a follow up appointment. DISCHARGE SUMMARY from Nurse    The following personal items collected during your admission are returned to you:   Dental Appliance: Dental Appliances: None  Vision: Visual Aid: None  Hearing Aid:    Jewelry:    Clothing:    Other Valuables:    Valuables sent to safe: If you were given prescriptions, please review the written information on prescribed medications. · You will receive a Post Operative Call from one of the Recovery Room Nurses on the day after your surgery to check on you. It is very important for us to know how you are recovering after your surgery. · You may receive an e-mail or letter in the mail from [employer] regarding your experience with us in the Ambulatory Surgery Unit. Your feedback is valuable to us and we appreciate your participation in the survey. If you have not had your influenza or pneumococcal vaccines, please follow up with your primary care physician. The discharge information has been reviewed with the patient. The patient verbalized understanding.

## 2022-05-31 NOTE — H&P
Procedural Case Note    5/31/2022    (2:23 PM)    Bryon Dress    1945   (78 y.o.)    [id number]    CC:  pain    ROS:   Complete ROS obtained, no CP, no SOB, no N or V    PMH:     Past Medical History:   Diagnosis Date    Adverse effect of anesthesia     combative with anesthesia    Arthritis     Cancer ([facility] [address]     tumor on nose, removed, pt not sure what type    Fibromyalgia     Heart failure ([facility] [address]     Hypertension     Sinus bradycardia     cardiologist in [address] chronic kidney disease ([facility] [address]     sees Peña       ALLERGIES:     Allergies   Allergen Reactions    Ambien [Zolpidem] Other (comments)     Hallucinations     Verapamil Cough and Other (comments)       MEDS:     No current facility-administered medications for this encounter. Visit Vitals  Ht 5' (1.524 m)   Wt 113.4 kg (250 lb)   BMI 48.82 kg/m²     PE:  Gen: NAD  Head: normocephalic  Heart: RRR  Lungs: CTA raymond  Abd: NT, ND, soft  Neuro: awake and alert  Skin: intact    IMPRESSION:   Lumbar radiculopathy    Note:  The clinical status was discussed in detail with the patient. The procedure was again discussed and described in detail. All understand and accept the planned procedure and risks; reject other forms of treatment. All questions are answered.     Sophia Mejia MD

## 2022-05-31 NOTE — PERIOP NOTES
Gregorio Agrawal  1945  [id number]    Situation:  Verbal report given from: ARIADNE Goodwin RN  Procedure: Procedure(s):  LEFT L4-L5, L5-S1 TRANSFORAMINAL LUMBAR EPIDURAL STEROID INJECTION    Background:    Preoperative diagnosis: LUMBAR RADICULOPATHY    Postoperative diagnosis: LUMBAR RADICULOPATHY    :  Dr. Salvador Pain:  Intra-procedure medications, procedure, and allergies reviewed        Recommendation:    Discharge patient home after discharge instructions reviewed with patient. Rest until local has worn off.

## 2022-05-31 NOTE — PERIOP NOTES
Pt has weak and unequal plantar and dorsi flexion. Pt is weaker on R side. Pt able to stand and hold weight. No swelling or bleeding at injection sites. D/c instructions reviewed, all questions answered. Reviewed when to call the doctor and activity. 1532-Transported via w/c to awaiting transportation. No complaints noted at this time.

## 2022-09-08 RX ORDER — ALBUTEROL SULFATE 90 UG/1
AEROSOL, METERED RESPIRATORY (INHALATION)
COMMUNITY

## 2022-09-08 NOTE — PERIOP NOTES
[facility]  Ambulatory Surgery Unit  Pre-operative Instructions    Procedure Date  Tuesday, September 13, 2022            Tentative Arrival Time 1115      1. On the day of your procedure, please report to the Ambulatory Surgery Unit Registration Desk and sign in at your designated time. The Ambulatory Surgery Unit is located in [facility] on the [facility] side of the [facility] across from the [address] [place] [address]. Please have all of your health insurance cards, copayment, and a photo ID.    **TWO adults may accompany you the day of the procedure. We have limited seating available. If our waiting room is at capacity, your ride may be asked to remain in their vehicle. No one under 15 is allowed in the waiting room. Masks, fully covering the mouth and nose, are required in the waiting room. 2. You must have someone with you to drive you home as directed by your surgeon. 3. You may have a light breakfast and take normal morning medications. 4. We recommend you do not drink any alcoholic beverages for 24 hours before and after your procedure. 5. Contact your surgeons office for instructions on the following medications: non-steroidal anti-inflammatory drugs (i.e. Advil, Aleve), vitamins, and supplements. (Some surgeons will want you to stop these medications prior to surgery and others may allow you to take them)   **If you are currently taking Plavix, Coumadin, Aspirin and/or other blood-thinning agents, contact your surgeon for instructions. ** Your surgeon will partner with the physician prescribing these medications to determine if it is safe to stop or if you need to continue taking. Please do not stop taking these medications without instructions from your surgeon. 6. In an effort to help prevent surgical site infection, we ask that you shower with an anti-bacterial soap (i.e. Dial or Safeguard) on the morning of your procedure.  Do not apply any lotions, powders, or deodorants after showering. 7. Wear comfortable clothes. Wear glasses instead of contacts. Do not bring any jewelry or money (other than copays or fees as instructed). Do not wear make-up, particularly mascara, the morning of your procedure. Wear your hair loose or down, no ponytails, buns, chelly pins or clips. All body piercings must be removed. 8. You should understand that if you do not follow these instructions your procedure may be cancelled. If your physical condition changes (i.e. fever, cold or flu) please contact your surgeon as soon as possible. 9. It is important that you be on time. If a situation occurs where you may be late, or if you have any questions or problems, please call [phone].    10. Your procedure time may be subject to change. You will receive a phone call the day prior to confirm your arrival time. I understand a pre-operative phone call will be made to verify my procedure time. In the event that I am not available, I give permission for a message to be left on my answering service and/or with another person?       yes    Preop instructions reviewed  Pt verbalized understanding.       ___________________      ___________________      ___________________  (Signature of Patient)          (Witness)                   (Date and Time)

## 2022-09-13 ENCOUNTER — APPOINTMENT (OUTPATIENT)
Dept: GENERAL RADIOLOGY | Age: 77
End: 2022-09-13
Attending: PHYSICAL MEDICINE & REHABILITATION
Payer: MEDICARE

## 2022-09-13 ENCOUNTER — HOSPITAL ENCOUNTER (OUTPATIENT)
Age: 77
Setting detail: OUTPATIENT SURGERY
Discharge: HOME | End: 2022-09-13
Attending: PHYSICAL MEDICINE & REHABILITATION | Admitting: PHYSICAL MEDICINE & REHABILITATION
Payer: MEDICARE

## 2022-09-13 VITALS
RESPIRATION RATE: 20 BRPM | BODY MASS INDEX: 49.87 KG/M2 | HEART RATE: 87 BPM | TEMPERATURE: 98.5 F | DIASTOLIC BLOOD PRESSURE: 94 MMHG | WEIGHT: 254 LBS | SYSTOLIC BLOOD PRESSURE: 117 MMHG | HEIGHT: 60 IN | OXYGEN SATURATION: 97 %

## 2022-09-13 PROCEDURE — 74011250636 HC RX REV CODE- 250/636: Performed by: PHYSICAL MEDICINE & REHABILITATION

## 2022-09-13 PROCEDURE — 76210000046 HC AMBSU PH II REC FIRST 0.5 HR: Performed by: PHYSICAL MEDICINE & REHABILITATION

## 2022-09-13 PROCEDURE — 77030003665 HC NDL SPN BBMI -A: Performed by: PHYSICAL MEDICINE & REHABILITATION

## 2022-09-13 PROCEDURE — 76000 FLUOROSCOPY <1 HR PHYS/QHP: CPT

## 2022-09-13 PROCEDURE — 74011000636 HC RX REV CODE- 636: Performed by: PHYSICAL MEDICINE & REHABILITATION

## 2022-09-13 PROCEDURE — 74011000250 HC RX REV CODE- 250: Performed by: PHYSICAL MEDICINE & REHABILITATION

## 2022-09-13 PROCEDURE — 72020 X-RAY EXAM OF SPINE 1 VIEW: CPT

## 2022-09-13 PROCEDURE — 2709999900 HC NON-CHARGEABLE SUPPLY: Performed by: PHYSICAL MEDICINE & REHABILITATION

## 2022-09-13 PROCEDURE — 76030000002 HC AMB SURG OR TIME FIRST 0.: Performed by: PHYSICAL MEDICINE & REHABILITATION

## 2022-09-13 RX ORDER — LIDOCAINE HYDROCHLORIDE 20 MG/ML
5 INJECTION, SOLUTION INFILTRATION; PERINEURAL ONCE
Status: COMPLETED | OUTPATIENT
Start: 2022-09-13 | End: 2022-09-13

## 2022-09-13 RX ORDER — DEXAMETHASONE SODIUM PHOSPHATE 4 MG/ML
6 INJECTION, SOLUTION INTRA-ARTICULAR; INTRALESIONAL; INTRAMUSCULAR; INTRAVENOUS; SOFT TISSUE ONCE
Status: COMPLETED | OUTPATIENT
Start: 2022-09-13 | End: 2022-09-13

## 2022-09-13 RX ORDER — BUPIVACAINE HYDROCHLORIDE 5 MG/ML
5 INJECTION, SOLUTION EPIDURAL; INTRACAUDAL ONCE
Status: DISCONTINUED | OUTPATIENT
Start: 2022-09-13 | End: 2022-09-13 | Stop reason: HOSPADM

## 2022-09-13 NOTE — OP NOTES
Operative Note    Patient: Yumiko Ruggiero  YOB: 1945  MRN: [id number]    Date of Procedure: 9/13/2022     Epidural Steroid Injection Operative Report    Indications: This is a 78 y.o. female who presents with low back pain and radiculopathy. She was positive for LS DDD with radiculopathy. The patient was admitted for spinal intervention as conservative measures have failed. Date of Surgery: 9/13/2022    Preoperative Diagnosis: LS DDD with Radiculopathy    Postoperative Diagnosis: LS DDD with Radiculopathy    Surgeon(s) and Role:     * Kathy Martinez MD - Primary     Procedure:  Procedure(s):  LEFT L4 AND L5 TRANSFORAMINAL STEROID INJECTION WITH LOCAL    Procedure in Detail:  After appropriate informed consent was obtained, the patient was taken to the operating suite and placed in the prone position on the operating table on appropriate padding. The LS region was prepped and draped in the usual sterile fashion. Intraoperative fluoroscopy was used to localize the LS spine. The skin was infiltrated with 2% lidocaine. A 25-g needle was advanced into the Left L4 and L5 neuroforamen under fluoroscopic guidance. A small amount of contrast was injected into the epidural space, confirming appropriate needle placement on fluoroscopy. Permanent fluoroscopic images were saved in the patient's record. Next, 2 ml of 2% lidocaine and 10 mg of Dexamethasone were injected. The needle was removed from the patient. The patient was then turned back into the supine position on the stretcher and was taken to the Recovery Room in stable condition.     Estimated Blood Loss:  none     Specimens: None       Drains: None          Complications:  None    Signed By: Megan Davison MD                        September 13, 2022          Electronically Signed by Megan Davison MD on 9/13/2022 at 1:09 PM

## 2022-09-13 NOTE — DISCHARGE INSTRUCTIONS
Dr. Prieto Do Discharge Instructions  Transforaminal Epidural Steroid Injection/ Selective Nerve Block    You had a transforaminal epidural steroid injection/ selective nerve block today. You will probably have some numbness, and possibly weakness, in your leg for the next 6 to 8 hours. The steroids will slowly become effective, reducing your pain, over the next 2 weeks. You should begin feeling better after a few days, but it may take up to 2 weeks to notice the difference. The benefit you get from your injection will last a variable amount of time, depending on the severity of your lumbar spine problem. Pain: Most people do not have any increase in pain after this injection. However, you might experience some soreness in your low back. If this happens, putting an ice pack over the sore area will help. Bandage: You will have a small bandage covering the site of the injection. You may remove it once you get home. Restrictions: Someone should drive you home after the injection. After that, you have no restrictions. You need to be careful while walking, as you may still have some numbness or weakness in your leg. You may resume your normal level of activity. You may take a shower or bath, and you may eat normally. You should continue your current exercises and/or therapy routine. Medications: Continue your current medications as prescribed. If your pain decreases, you may reduce the amount of your pain medicines. If you stopped taking anticoagulants or blood-thinners before the injection, start them tomorrow. If you have diabetes, your blood sugar may be elevated for a few days. Call your primary doctor with any questions.   Call Dr. Ida Yusuf at [phone] if you experience:  Fever (101 degrees Fahrenheit or greater)  Nausea or vomiting  Headache unrelieved by your normal pain medicine  Redness or swelling at the injection site that lasts more than 1 day  New numbness, tingling, weakness, or pain that you didnt have before the injection    Follow-up appointment:   If still having pain in 1-2 weeks, call office at [phone] for a follow up appointment. DISCHARGE SUMMARY from Nurse    The following personal items collected during your admission are returned to you:   Dental Appliance: Dental Appliances: None  Vision: Visual Aid: Glasses  Hearing Aid:    Jewelry:    Clothing:    Other Valuables:    Valuables sent to safe: If you were given prescriptions, please review the written information on prescribed medications. You will receive a Post Operative Call from one of the Recovery Room Nurses on the day after your surgery to check on you. It is very important for us to know how you are recovering after your surgery. You may receive an e-mail or letter in the mail from [employer] regarding your experience with us in the Ambulatory Surgery Unit. Your feedback is valuable to us and we appreciate your participation in the survey. If you have not had your influenza or pneumococcal vaccines, please follow up with your primary care physician. The discharge information has been reviewed with the patient. The patient verbalized understanding.

## 2022-09-13 NOTE — H&P
Procedural Case Note    9/13/2022    (11:39 AM)    Ronen Rand    1945   (78 y.o.)    [id number]    CC:  pain    ROS:   Complete ROS obtained, no CP, no SOB, no N or V    PMH:     Past Medical History:   Diagnosis Date    Adverse effect of anesthesia     combative with anesthesia    Arthritis     Cancer ([facility] [address]     tumor on nose, removed, pt not sure what type    Dry eye     Fibromyalgia     Heart failure ([facility] [address]     Hypertension     Sinus bradycardia     cardiologist in [place]    Stage 3 chronic kidney disease ([facility] [address]     sees Peña       ALLERGIES:     Allergies   Allergen Reactions    Ambien [Zolpidem] Other (comments)     Hallucinations     Verapamil Cough and Other (comments)       MEDS:     No current facility-administered medications for this encounter. Visit Vitals  Ht 5' (1.524 m)   Wt 114.8 kg (253 lb)   BMI 49.41 kg/m²     PE:  Gen: NAD  Head: normocephalic  Heart: RRR  Lungs: CTA raymond  Abd: NT, ND, soft  Neuro: awake and alert  Skin: intact    IMPRESSION:   Lumbar radiculopathy    Note:  The clinical status was discussed in detail with the patient. The procedure was again discussed and described in detail. All understand and accept the planned procedure and risks; reject other forms of treatment. All questions are answered.     Romana Peal, MD

## 2022-09-13 NOTE — PERIOP NOTES
Patient received to PACU, VSS. Patient awake and alert with no complaints of pain. Injection site intact. Patient has audible wheezing in PACU - patient states present on admission and she uses albuterol at home to manage this. Instructed patient to follow up with PCP if does not improve. O2 sat 97% on room air. Neuro:  Push/Pull assessment:     LLE Response: strong push/pull   RLE Response: strong push/pull    Discharge instructions given. Patient verbalized understanding of instructions and follow up. Patient states ready for discharge - patient discharged at this time by wheelchair with all belongings. Daughter in [facility] to provide transportation home.

## 2022-09-13 NOTE — PERIOP NOTES
Thom Robin  1945  [id number]    Situation:  Verbal report given from: Reg Pool RN  Procedure: Procedure(s):  LEFT L4 AND L5 TRANSFORAMINAL STEROID INJECTION WITH LOCAL    Background:    Preoperative diagnosis: DDD (degenerative disc disease), lumbar [M51.36]  Lumbar radiculopathy [M54.16]  Lumbar spondylosis [M47.816]  Spinal stenosis, lumbar region, with neurogenic claudication [M48.062]  Lumbar radiculitis [M54.16]  Lumbar pain [M54.50]  Spondylolisthesis of lumbar region [M43.16]  Fibromyalgia syndrome [M79.7]    Postoperative diagnosis: DDD (degenerative disc disease), lumbar [M51.36]  Lumbar radiculopathy [M54.16]  Lumbar spondylosis [M47.816]  Spinal stenosis, lumbar region, with neurogenic claudication [M48.062]  Lumbar radiculitis [M54.16]  Lumbar pain [M54.50]  Spondylolisthesis of lumbar region [M43.16]  Fibromyalgia syndrome [M79.7]    :  Dr. Guerin Cage): Circ-1: Kayli Lizarraga  Local Nurse Monitor: Luz Marina Richardson RN  Scrub Tech-1: RT Astrid  Radiology Technologist: Elia Maciel    Specimens: * No specimens in log *    Assessment:  Intra-procedure medications         Anesthesia gave intra-procedure sedation and medications, see anesthesia flow sheet     Intravenous fluids: LR@ KVO     Vital signs stable

## 2022-12-01 ENCOUNTER — HOSPITAL ENCOUNTER (OUTPATIENT)
Dept: GENERAL RADIOLOGY | Age: 77
Discharge: HOME | End: 2022-12-01
Payer: MEDICARE

## 2022-12-01 ENCOUNTER — TRANSCRIBE ORDER (OUTPATIENT)
Dept: REGISTRATION | Age: 77
End: 2022-12-01

## 2022-12-01 DIAGNOSIS — J45.909 ASTHMA: Primary | ICD-10-CM

## 2022-12-01 DIAGNOSIS — J45.909 ASTHMA: ICD-10-CM

## 2022-12-01 PROCEDURE — 71046 X-RAY EXAM CHEST 2 VIEWS: CPT

## 2022-12-12 RX ORDER — LIDOCAINE HYDROCHLORIDE 20 MG/ML
5 INJECTION, SOLUTION INFILTRATION; PERINEURAL ONCE
Status: CANCELLED | OUTPATIENT
Start: 2022-12-13 | End: 2022-12-13

## 2022-12-12 RX ORDER — POLYETHYLENE GLYCOL 400 AND PROPYLENE GLYCOL 4; 3 MG/ML; MG/ML
SOLUTION/ DROPS OPHTHALMIC AS NEEDED
COMMUNITY

## 2022-12-12 NOTE — PERIOP NOTES
[facility]  Ambulatory Surgery Unit  Pre-operative Instructions    Procedure Date  12/13/2022            Tentative Arrival Time 1230      1. On the day of your procedure, please report to the Ambulatory Surgery Unit Registration Desk and sign in at your designated time. The Ambulatory Surgery Unit is located in [facility] on the [facility] side of the [facility] across from the [address] [place] [address]. Please have all of your health insurance cards, copayment, and a photo ID.    **TWO adults may accompany you the day of the procedure. We have limited seating available. If our waiting room is at capacity, your ride may be asked to remain in their vehicle. No one under 15 is allowed in the waiting room. Masks, fully covering the mouth and nose, are required in the waiting room. 2. You must have someone with you to drive you home as directed by your surgeon. 3. You may have a light breakfast and take normal morning medications. 4. We recommend you do not drink any alcoholic beverages for 24 hours before and after your procedure. 5. Contact your surgeons office for instructions on the following medications: non-steroidal anti-inflammatory drugs (i.e. Advil, Aleve), vitamins, and supplements. (Some surgeons will want you to stop these medications prior to surgery and others may allow you to take them)   **If you are currently taking Plavix, Coumadin, Aspirin and/or other blood-thinning agents, contact your surgeon for instructions. ** Your surgeon will partner with the physician prescribing these medications to determine if it is safe to stop or if you need to continue taking. Please do not stop taking these medications without instructions from your surgeon. 6. In an effort to help prevent surgical site infection, we ask that you shower with an anti-bacterial soap (i.e. Dial or Safeguard) on the morning of your procedure.  Do not apply any lotions, powders, or deodorants after showering. 7. Wear comfortable clothes. Wear glasses instead of contacts. Do not bring any jewelry or money (other than copays or fees as instructed). Do not wear make-up, particularly mascara, the morning of your procedure. Wear your hair loose or down, no ponytails, buns, chelly pins or clips. All body piercings must be removed. 8. You should understand that if you do not follow these instructions your procedure may be cancelled. If your physical condition changes (i.e. fever, cold or flu) please contact your surgeon as soon as possible. 9. It is important that you be on time. If a situation occurs where you may be late, or if you have any questions or problems, please call [phone].    10. Your procedure time may be subject to change. You will receive a phone call the day prior to confirm your arrival time. I understand a pre-operative phone call will be made to verify my procedure time. In the event that I am not available, I give permission for a message to be left on my answering service and/or with another person?       Yes      Reviewed instructions with patient, able to verbalized understanding.         ___________________      ___________________      ___________________  (Signature of Patient)          (Witness)                   (Date and Time)

## 2022-12-13 ENCOUNTER — APPOINTMENT (OUTPATIENT)
Dept: GENERAL RADIOLOGY | Age: 77
End: 2022-12-13
Attending: PHYSICAL MEDICINE & REHABILITATION
Payer: MEDICARE

## 2022-12-13 ENCOUNTER — HOSPITAL ENCOUNTER (OUTPATIENT)
Age: 77
Setting detail: OUTPATIENT SURGERY
Discharge: HOME | End: 2022-12-13
Attending: PHYSICAL MEDICINE & REHABILITATION | Admitting: PHYSICAL MEDICINE & REHABILITATION
Payer: MEDICARE

## 2022-12-13 VITALS
OXYGEN SATURATION: 100 % | HEART RATE: 101 BPM | DIASTOLIC BLOOD PRESSURE: 80 MMHG | TEMPERATURE: 97.5 F | SYSTOLIC BLOOD PRESSURE: 150 MMHG | BODY MASS INDEX: 49.48 KG/M2 | WEIGHT: 252 LBS | HEIGHT: 60 IN | RESPIRATION RATE: 17 BRPM

## 2022-12-13 PROCEDURE — 77030013367: Performed by: PHYSICAL MEDICINE & REHABILITATION

## 2022-12-13 PROCEDURE — 72020 X-RAY EXAM OF SPINE 1 VIEW: CPT

## 2022-12-13 PROCEDURE — 74011250636 HC RX REV CODE- 250/636: Performed by: PHYSICAL MEDICINE & REHABILITATION

## 2022-12-13 PROCEDURE — 76000 FLUOROSCOPY <1 HR PHYS/QHP: CPT

## 2022-12-13 PROCEDURE — 76210000046 HC AMBSU PH II REC FIRST 0.5 HR: Performed by: PHYSICAL MEDICINE & REHABILITATION

## 2022-12-13 PROCEDURE — 76030000002 HC AMB SURG OR TIME FIRST 0.: Performed by: PHYSICAL MEDICINE & REHABILITATION

## 2022-12-13 PROCEDURE — 2709999900 HC NON-CHARGEABLE SUPPLY: Performed by: PHYSICAL MEDICINE & REHABILITATION

## 2022-12-13 PROCEDURE — 74011000250 HC RX REV CODE- 250: Performed by: PHYSICAL MEDICINE & REHABILITATION

## 2022-12-13 PROCEDURE — 77030003665 HC NDL SPN BBMI -A: Performed by: PHYSICAL MEDICINE & REHABILITATION

## 2022-12-13 RX ORDER — LIDOCAINE HYDROCHLORIDE 10 MG/ML
10 INJECTION, SOLUTION EPIDURAL; INFILTRATION; INTRACAUDAL; PERINEURAL ONCE
Status: COMPLETED | OUTPATIENT
Start: 2022-12-13 | End: 2022-12-13

## 2022-12-13 RX ORDER — DEXAMETHASONE SODIUM PHOSPHATE 4 MG/ML
10 INJECTION, SOLUTION INTRA-ARTICULAR; INTRALESIONAL; INTRAMUSCULAR; INTRAVENOUS; SOFT TISSUE ONCE
Status: COMPLETED | OUTPATIENT
Start: 2022-12-13 | End: 2022-12-13

## 2022-12-13 RX ORDER — BUPIVACAINE HYDROCHLORIDE 5 MG/ML
5 INJECTION, SOLUTION EPIDURAL; INTRACAUDAL ONCE
Status: COMPLETED | OUTPATIENT
Start: 2022-12-13 | End: 2022-12-13

## 2022-12-13 NOTE — H&P
Procedural Case Note    12/13/2022    (12:33 PM)    Al Can    1945   (78 y.o.)    [id number]    CC:  pain    ROS:   Complete ROS obtained, no CP, no SOB, no N or V    PMH:     Past Medical History:   Diagnosis Date    Adverse effect of anesthesia     combative with anesthesia    Arthritis     Asthma     Cancer ([facility] [address]     tumor on nose, removed, pt not sure what type    Dry eye     Fibromyalgia     Heart failure ([facility] [address]     Hypertension     Sinus bradycardia     cardiologist in [place]    Stage 3 chronic kidney disease ([facility] [address]     sees [place]       ALLERGIES:     Allergies   Allergen Reactions    Ambien [Zolpidem] Other (comments)     Hallucinations     Verapamil Cough and Other (comments)       MEDS:     Current Facility-Administered Medications   Medication Dose Route Frequency    bupivacaine (PF) (MARCAINE) 0.5 % (5 mg/mL) injection 25 mg  5 mL Epidural ONCE    dexamethasone (DECADRON) 4 mg/mL injection 10 mg  10 mg Intra artICUlar ONCE    iohexoL (OMNIPAQUE) 180 mg iodine/mL injection 10 mL  10 mL Intra artICUlar ONCE    lidocaine (PF) (XYLOCAINE) 10 mg/mL (1 %) injection 10 mL  10 mL Intra artICUlar ONCE        Visit Vitals  Ht 5' (1.524 m)   Wt 113.4 kg (250 lb)   BMI 48.82 kg/m²     PE:  Gen: NAD  Head: normocephalic  Heart: RRR  Lungs: CTA raymond  Abd: NT, ND, soft  Neuro: awake and alert  Skin: intact    IMPRESSION:   Lumbar spondylosis    Note:  The clinical status was discussed in detail with the patient. The procedure was again discussed and described in detail. All understand and accept the planned procedure and risks; reject other forms of treatment. All questions are answered.     Humble Maier MD

## 2022-12-13 NOTE — PERIOP NOTES
Pt has strong and equal plantar and dorsi flexion. Pt able to stand and hold weight. No swelling or bleeding at injection sites. D/c instructions reviewed, all questions answered. Reviewed when to call the doctor,diet,and activity. Reviewed to use cane today. Pt given recommendation for lung doctor with Dr. Tanya Rowe. 1331-Transported via w/c to awaiting transportation.

## 2022-12-13 NOTE — PERIOP NOTES
Mikejimbo Funez  1945  [id number]    Situation:  Verbal report given from: Z. Willy Boeck RN  Procedure: Procedure(s):  RIGHT L4-5, L5-S1 RADIO FREQUENCY ABLATION    Background:    Preoperative diagnosis: Lumbar spondylosis [M47.816]  DDD (degenerative disc disease), lumbar [M51.36]  Lumbar radiculopathy [M54.16]    Postoperative diagnosis: Lumbar spondylosis [M47.816]  DDD (degenerative disc disease), lumbar [M51.36]  Lumbar radiculopathy [M54.16]    :  Dr. Xuan Contrerasks:  Intra-procedure medications, procedure, and allergies reviewed        Recommendation:    Discharge patient home after discharge instructions reviewed with patient. Rest until local has worn off.

## 2022-12-13 NOTE — OP NOTES
Operative Note    Patient: Winsome Mckeon  YOB: 1945  MRN: [id number]    Date of Procedure: 12/13/2022   PROCEDURES PERFORMED:   Radiofrequency neuroablation of the medial branches R L3, L4 and L5    PREPROCEDURE DIAGNOSIS: lumbar spondylosis with chronic back pain. POST PROCEDURE DIAGNOSIS: lumbar spondylosis with chronic back pain. SURGEON: Luisa Posey M.D. HISTORY OF PRESENT ILLNESS AND INDICATIONS FOR THE PROCEDURE: This patient was previously given 2 diagnostic successful blocks of the facet joints innervated by the aforementioned medial branches (with over 80% relief and improved function), and is here today for ablation and neurolysis of those branches. She has previously failed conservative management which has proceeded to injection therapy. FINDINGS AND PROCEDURES:  Adequate informed consent was obtained and the patient was taken to the procedure room and placed in the prone position on the procedure table. A time out was held for patient verification. The patient had monitoring throughout the procedure at cycled one minute blood pressure, pulse, and pulse oximetry. The skin was prepped and draped in the normal sterile fashion. Using fluoroscopic guidance in anteroposterior, oblique, and lateral views, the appropriate superior articular processes and pedicles were identified. After identification 1 percent lidocaine skin anesthesia was administered at each site. Using a radiofrequency ablation needle, a 22 gauge 10 millimeter active tip was placed at the medial branch nerves, right L3, L4 and L5. The initial impedance in Ohms was within acceptable limits. Each level was subsequently tested on sensory at 50 megahertz with no referred pattern of pain noted at any level. Each level was also tested at 2 Hertz with no distal motor movement or referred pain pattern.  The lesion site was injected with 0.5 milliliters of 0.5 percent bupivacaine anesthesia of the nerve branch. Next a lesion was applied for 90 seconds at 80 degrees. Permanent fluoroscopic images were saved in the patient's record. The patient tolerated the procedure well with no apparent complications. The patient was taken to the recovery area for further monitoring. Post procedure neurologic examination was consistent with preprocedure examination. The patient was discharged home ambulatory with family and was given post procedure instructions. FOLLOW-UP: The patient will have a follow-up appointment scheduled by the clinic in the coming weeks.             Electronically Signed by Adan Lovett MD on 12/13/2022 at 1:15 PM

## 2022-12-13 NOTE — DISCHARGE INSTRUCTIONS
Radiofrequency Ablation  Discharge Instructions    You had a radiofrequency ablation today. You will probably have some numbness in your lower back area for the next 6-8 hours. You should begin feeling better after a few days, but it may take up to 2 weeks to notice the difference. The benefit you get from your injection will last a variable amount of time, depending on the severity of your spine problem. Pain:  Most people do not have any increase in pain after this injection. However, you might experience some soreness a few days at the site of the injection. If this happens, putting an ice pack over the sore area will help. Bandage: You have a small bandage covering the site of the injection. You may remove it when you get home. Restrictions:  Some one should drive you home after the injection. After that, you have no restrictions. You may resume your normal level of activity. You may take a shower or bath, and you may eat normally. You should continue your current exercise and/or therapy routine. Medications:  Continue your current medications as prescribed. If you pain decreases, you may reduce the amount of your pain medications. If you stopped taking anticoagulants or blood-thinners before the injection, start them tomorrow. Call Dr. Mildred Lee at [phone] if you experience:    Fever (101 degrees Fahrenheit or greater)  Nausea or vomiting  Headache unrelieved by your normal pain medication  Redness or swelling at the injection site that lasts more than 1 day  New numbness, tingling, weakness, or pain that you didn't have before the injection      If still having pain in 1-2 weeks, call office at [phone] for a follow up appointment.         DISCHARGE SUMMARY from Nurse    The following personal items collected during your admission are returned to you:   Dental Appliance: Dental Appliances: None  Vision: Visual Aid: Glasses  Hearing Aid:    Jewelry: Jewelry: None  Clothing: Clothing: With patient  Other Valuables: Other Valuables: None  Valuables sent to safe: If you were given prescriptions, please review the written information on prescribed medications. You will receive a Post Operative Call from one of the Recovery Room Nurses on the day after your surgery to check on you. It is very important for us to know how you are recovering after your surgery. You may receive an e-mail or letter in the mail from [employer] regarding your experience with us in the Ambulatory Surgery Unit. Your feedback is valuable to us and we appreciate your participation in the survey. If you have not had your influenza or pneumococcal vaccines, please follow up with your primary care physician. The discharge information has been reviewed with the patient. The patient verbalized understanding.

## 2023-01-03 NOTE — PERIOP NOTES
[facility]  Ambulatory Surgery Unit  Pre-operative Instructions    Procedure Date  1/10            Tentative Arrival Time 10:30am      1. On the day of your procedure, please report to the Ambulatory Surgery Unit Registration Desk and sign in at your designated time. The Ambulatory Surgery Unit is located in [facility] on the [facility] side of the [facility] across from the [address] [place] [address]. Please have all of your health insurance cards, copayment, and a photo ID.    **TWO adults may accompany you the day of the procedure. We have limited seating available. If our waiting room is at capacity, your ride may be asked to remain in their vehicle. No one under 15 is allowed in the waiting room. 2. You must have someone with you to drive you home as directed by your surgeon. 3. You may have a light breakfast and take normal morning medications. 4. We recommend you do not drink any alcoholic beverages for 24 hours before and after your procedure. 5. Contact your surgeons office for instructions on the following medications: non-steroidal anti-inflammatory drugs (i.e. Advil, Aleve), vitamins, and supplements. (Some surgeons will want you to stop these medications prior to surgery and others may allow you to take them)   **If you are currently taking Plavix, Coumadin, Aspirin and/or other blood-thinning agents, contact your surgeon for instructions. ** Your surgeon will partner with the physician prescribing these medications to determine if it is safe to stop or if you need to continue taking. Please do not stop taking these medications without instructions from your surgeon. 6. In an effort to help prevent surgical site infection, we ask that you shower with an anti-bacterial soap (i.e. Dial or Safeguard) on the morning of your procedure. Do not apply any lotions, powders, or deodorants after showering. 7. Wear comfortable clothes. Wear glasses instead of contacts.  Do not bring any jewelry or money (other than copays or fees as instructed). Do not wear make-up, particularly mascara, the morning of your procedure. Wear your hair loose or down, no ponytails, buns, chelly pins or clips. All body piercings must be removed. 8. You should understand that if you do not follow these instructions your procedure may be cancelled. If your physical condition changes (i.e. fever, cold or flu) please contact your surgeon as soon as possible. 9. It is important that you be on time. If a situation occurs where you may be late, or if you have any questions or problems, please call [phone].    10. Your procedure time may be subject to change. You will receive a phone call the day prior to confirm your arrival time. I understand a pre-operative phone call will be made to verify my procedure time. In the event that I am not available, I give permission for a message to be left on my answering service and/or with another person?       yes    Reviewed by phone with pt, verbalized understanding.     ___________________      ___________________      ___________________  (Signature of Patient)          (Witness)                   (Date and Time)

## 2023-01-10 ENCOUNTER — APPOINTMENT (OUTPATIENT)
Dept: GENERAL RADIOLOGY | Age: 78
End: 2023-01-10
Attending: PHYSICAL MEDICINE & REHABILITATION
Payer: MEDICARE

## 2023-01-10 ENCOUNTER — HOSPITAL ENCOUNTER (OUTPATIENT)
Age: 78
Setting detail: OUTPATIENT SURGERY
Discharge: HOME | End: 2023-01-10
Attending: PHYSICAL MEDICINE & REHABILITATION | Admitting: PHYSICAL MEDICINE & REHABILITATION
Payer: MEDICARE

## 2023-01-10 VITALS
DIASTOLIC BLOOD PRESSURE: 75 MMHG | BODY MASS INDEX: 49.48 KG/M2 | TEMPERATURE: 96.8 F | OXYGEN SATURATION: 95 % | SYSTOLIC BLOOD PRESSURE: 166 MMHG | HEIGHT: 60 IN | HEART RATE: 74 BPM | RESPIRATION RATE: 18 BRPM | WEIGHT: 252 LBS

## 2023-01-10 PROCEDURE — 76210000050 HC AMBSU PH II REC 0.5 TO 1 HR: Performed by: PHYSICAL MEDICINE & REHABILITATION

## 2023-01-10 PROCEDURE — 74011000250 HC RX REV CODE- 250: Performed by: PHYSICAL MEDICINE & REHABILITATION

## 2023-01-10 PROCEDURE — 76000 FLUOROSCOPY <1 HR PHYS/QHP: CPT

## 2023-01-10 PROCEDURE — 74011250636 HC RX REV CODE- 250/636: Performed by: PHYSICAL MEDICINE & REHABILITATION

## 2023-01-10 PROCEDURE — 77030003665 HC NDL SPN BBMI -A: Performed by: PHYSICAL MEDICINE & REHABILITATION

## 2023-01-10 PROCEDURE — 2709999900 HC NON-CHARGEABLE SUPPLY: Performed by: PHYSICAL MEDICINE & REHABILITATION

## 2023-01-10 PROCEDURE — 72020 X-RAY EXAM OF SPINE 1 VIEW: CPT

## 2023-01-10 PROCEDURE — 76030000002 HC AMB SURG OR TIME FIRST 0.: Performed by: PHYSICAL MEDICINE & REHABILITATION

## 2023-01-10 RX ORDER — BUPIVACAINE HYDROCHLORIDE 5 MG/ML
10 INJECTION, SOLUTION EPIDURAL; INTRACAUDAL ONCE
Status: COMPLETED | OUTPATIENT
Start: 2023-01-10 | End: 2023-01-10

## 2023-01-10 RX ORDER — LIDOCAINE HYDROCHLORIDE 20 MG/ML
10 INJECTION, SOLUTION INFILTRATION; PERINEURAL ONCE
Status: COMPLETED | OUTPATIENT
Start: 2023-01-10 | End: 2023-01-10

## 2023-01-10 RX ORDER — DEXAMETHASONE SODIUM PHOSPHATE 4 MG/ML
6 INJECTION, SOLUTION INTRA-ARTICULAR; INTRALESIONAL; INTRAMUSCULAR; INTRAVENOUS; SOFT TISSUE ONCE
Status: COMPLETED | OUTPATIENT
Start: 2023-01-10 | End: 2023-01-10

## 2023-01-10 NOTE — PERIOP NOTES
Earnstine Flaming  1945  [id number]    Situation:  Verbal report given from: JAMAAL Buenrostro RN  Procedure: Procedure(s):  LEFT L4-5, L5-S1 RADIO FREQUENCY ABLATION    Background:    Preoperative diagnosis: LUMBAR SPONDYLOSIS/RADICULOPATHY/DEGENERATIVE DISC DISEASE    Postoperative diagnosis: LUMBAR SPONDYLOSIS/RADICULOPATHY/DEGENERATIVE DISC DISEASE    :  Dr. Sherie Fernández:  Intra-procedure medications, procedure, and allergies reviewed        Recommendation:    Discharge patient home after discharge instructions reviewed with patient. Rest until local has worn off.

## 2023-01-10 NOTE — DISCHARGE INSTRUCTIONS
Radiofrequency Ablation  Discharge Instructions    You had a radiofrequency ablation today. You will probably have some numbness in your lower back area for the next 6-8 hours. You should begin feeling better after a few days, but it may take up to 2 weeks to notice the difference. The benefit you get from your injection will last a variable amount of time, depending on the severity of your spine problem. Pain:  Most people do not have any increase in pain after this injection. However, you might experience some soreness a few days at the site of the injection. If this happens, putting an ice pack over the sore area will help. Bandage: You have a small bandage covering the site of the injection. You may remove it when you get home. Restrictions:  Some one should drive you home after the injection. After that, you have no restrictions. You may resume your normal level of activity. You may take a shower or bath, and you may eat normally. You should continue your current exercise and/or therapy routine. Medications:  Continue your current medications as prescribed. If you pain decreases, you may reduce the amount of your pain medications. If you stopped taking anticoagulants or blood-thinners before the injection, start them tomorrow. Call Dr. Brett Zavala at [phone] if you experience:    Fever (101 degrees Fahrenheit or greater)  Nausea or vomiting  Headache unrelieved by your normal pain medication  Redness or swelling at the injection site that lasts more than 1 day  New numbness, tingling, weakness, or pain that you didn't have before the injection      If still having pain in 1-2 weeks, call office at [phone] for a follow up appointment.         DISCHARGE SUMMARY from Nurse    The following personal items collected during your admission are returned to you:   Dental Appliance: Dental Appliances: None  Vision: Visual Aid: Glasses  Hearing Aid:    Jewelry: Jewelry: None  Clothing: Clothing: With patient  Other Valuables: Other Valuables: Eyeglasses, Cell Phone, [employer], With patient  Valuables sent to safe: If you were given prescriptions, please review the written information on prescribed medications. You will receive a Post Operative Call from one of the Recovery Room Nurses on the day after your surgery to check on you. It is very important for us to know how you are recovering after your surgery. You may receive an e-mail or letter in the mail from [employer] regarding your experience with us in the Ambulatory Surgery Unit. Your feedback is valuable to us and we appreciate your participation in the survey. If you have not had your influenza or pneumococcal vaccines, please follow up with your primary care physician. The discharge information has been reviewed with the patient. The patient verbalized understanding.

## 2023-01-10 NOTE — OP NOTES
Operative Note    Patient: Lesly Montero  YOB: 1945  MRN: [id number]    Date of Procedure: 1/10/2023   PROCEDURES PERFORMED:   Radiofrequency neuroablation of the medial branches Left L3, L4 and L5    PREPROCEDURE DIAGNOSIS: lumbar spondylosis with chronic back pain. POST PROCEDURE DIAGNOSIS: lumbar spondylosis with chronic back pain. SURGEON: Vargas Shirley M.D. HISTORY OF PRESENT ILLNESS AND INDICATIONS FOR THE PROCEDURE: This patient was previously given diagnostic successful blocks of the facet joints innervated by the aforementioned medial branches and is here today for ablation and neurolysis of those branches. She has previously failed conservative management which has proceeded to injection therapy. FINDINGS AND PROCEDURES:  Adequate informed consent was obtained and the patient was taken to the procedure room and placed in the prone position on the procedure table. A time out was held for patient verification. The patient had monitoring throughout the procedure at cycled one minute blood pressure, pulse, and pulse oximetry. The skin was prepped and draped in the normal sterile fashion. Using fluoroscopic guidance in anteroposterior, oblique, and lateral views, the appropriate superior articular processes and pedicles were identified. After identification 1 percent lidocaine skin anesthesia was administered at each site. Using a radiofrequency ablation needle, a 22 gauge 10 millimeter active tip was placed at the medial branch nerves, left L3, L4 and L5. The initial impedance in Ohms was within acceptable limits. Each level was subsequently tested on sensory at 50 megahertz with no referred pattern of pain noted at any level. Each level was also tested at 2 Hertz with no distal motor movement or referred pain pattern. The lesion site was injected with 0.5 milliliters of 0.5 percent bupivacaine anesthesia of the nerve branch.  Next a lesion was applied for 90 seconds at 80 degrees. Permanent fluoroscopic images were saved in the patient's record. The patient tolerated the procedure well with no apparent complications. The patient was taken to the recovery area for further monitoring. Post procedure neurologic examination was consistent with preprocedure examination. The patient was discharged home ambulatory with family and was given post procedure instructions. FOLLOW-UP: The patient will have a follow-up appointment scheduled by the clinic in the coming weeks.           Electronically Signed by Dorys Perkins MD on 1/10/2023 at 1:50 PM

## 2023-01-10 NOTE — H&P
Procedural Case Note    1/10/2023    (11:18 AM)    Shira Alamo    1945   (78 y.o.)    [id number]    CC:  pain    ROS:   Complete ROS obtained, no CP, no SOB, no N or V    PMH:     Past Medical History:   Diagnosis Date    Adverse effect of anesthesia     combative with anesthesia    Arthritis     Asthma     1/3/23 pt denies    Cancer ([facility]     tumor on nose, removed, pt not sure what type    Dry eye     Fibromyalgia     Heart failure ([facility]     Hypertension     Sinus bradycardia     cardiologist in [facility]    Stage 3 chronic kidney disease ([facility]     sees Peña       ALLERGIES:     Allergies   Allergen Reactions    Ambien [Zolpidem] Other (comments)     Hallucinations     Verapamil Cough and Other (comments)       MEDS:     No current facility-administered medications for this encounter. Visit Vitals  Ht 5' (1.524 m)   Wt 114.3 kg (252 lb)   BMI 49.22 kg/m²     PE:  Gen: NAD  Head: normocephalic  Heart: RRR  Lungs: CTA raymond  Abd: NT, ND, soft  Neuro: awake and alert  Skin: intact    IMPRESSION:   Lumbar spondylosis    Note:  The clinical status was discussed in detail with the patient. The procedure was again discussed and described in detail. All understand and accept the planned procedure and risks; reject other forms of treatment. All questions are answered.     Jesus Blue MD

## 2023-01-10 NOTE — PERIOP NOTES
Pt has strong and equal plantar and dorsi flexion. Pt able to stand and hold weight. No swelling or bleeding at injection sites. D/c instructions reviewed, all questions answered. Reviewed when to call the doctor and activity. Reviewed about a follow up appt. 2804-Transported via w/c to awaiting transportation.   No complaints noted at this time

## 2023-03-07 RX ORDER — LANOLIN ALCOHOL/MO/W.PET/CERES
500 CREAM (GRAM) TOPICAL DAILY
COMMUNITY

## 2023-03-07 RX ORDER — FUROSEMIDE 40 MG/1
40 TABLET ORAL DAILY
COMMUNITY

## 2023-03-07 NOTE — PERIOP NOTES
[facility]  Ambulatory Surgery Unit  Pre-operative Instructions    Procedure Date  Tuesday, March 14, 2023            Tentative Arrival Time 1415      1. On the day of your procedure, please report to the Ambulatory Surgery Unit Registration Desk and sign in at your designated time. The Ambulatory Surgery Unit is located in [facility] on the [facility] side of the [facility] across from the [address] [place] [address]. Please have all of your health insurance cards, copayment, and a photo ID.    **TWO adults may accompany you the day of the procedure. We have limited seating available. If our waiting room is at capacity, your ride may be asked to remain in their vehicle. No one under 15 is allowed in the waiting room. 2. You must have someone with you to drive you home as directed by your surgeon. 3. You may have a light breakfast and take normal morning medications. 4. We recommend you do not drink any alcoholic beverages for 24 hours before and after your procedure. 5. Contact your surgeons office for instructions on the following medications: non-steroidal anti-inflammatory drugs (i.e. Advil, Aleve), vitamins, and supplements. (Some surgeons will want you to stop these medications prior to surgery and others may allow you to take them)   **If you are currently taking Plavix, Coumadin, Aspirin and/or other blood-thinning agents, contact your surgeon for instructions. ** Your surgeon will partner with the physician prescribing these medications to determine if it is safe to stop or if you need to continue taking. Please do not stop taking these medications without instructions from your surgeon. 6. In an effort to help prevent surgical site infection, we ask that you shower with an anti-bacterial soap (i.e. Dial or Safeguard) on the morning of your procedure. Do not apply any lotions, powders, or deodorants after showering. 7. Wear comfortable clothes.  Wear glasses instead of contacts. Do not bring any jewelry or money (other than copays or fees as instructed). Do not wear make-up, particularly mascara, the morning of your procedure. Wear your hair loose or down, no ponytails, buns, chelly pins or clips. All body piercings must be removed. 8. You should understand that if you do not follow these instructions your procedure may be cancelled. If your physical condition changes (i.e. fever, cold or flu) please contact your surgeon as soon as possible. 9. It is important that you be on time. If a situation occurs where you may be late, or if you have any questions or problems, please call [phone].    10. Your procedure time may be subject to change. You will receive a phone call the day prior to confirm your arrival time. I understand a pre-operative phone call will be made to verify my procedure time. In the event that I am not available, I give permission for a message to be left on my answering service and/or with another person?       yes    Preop instructions reviewed  Pt verbalized understanding.       ___________________      ___________________      ___________________  (Signature of Patient)          (Witness)                   (Date and Time)

## 2023-03-14 ENCOUNTER — APPOINTMENT (OUTPATIENT)
Dept: GENERAL RADIOLOGY | Age: 78
End: 2023-03-14
Attending: PHYSICAL MEDICINE & REHABILITATION
Payer: MEDICARE

## 2023-03-14 ENCOUNTER — HOSPITAL ENCOUNTER (OUTPATIENT)
Age: 78
Setting detail: OUTPATIENT SURGERY
Discharge: HOME | End: 2023-03-14
Attending: PHYSICAL MEDICINE & REHABILITATION | Admitting: PHYSICAL MEDICINE & REHABILITATION
Payer: MEDICARE

## 2023-03-14 VITALS
DIASTOLIC BLOOD PRESSURE: 68 MMHG | HEART RATE: 83 BPM | TEMPERATURE: 97.8 F | HEIGHT: 60 IN | RESPIRATION RATE: 19 BRPM | WEIGHT: 247 LBS | BODY MASS INDEX: 48.49 KG/M2 | OXYGEN SATURATION: 98 % | SYSTOLIC BLOOD PRESSURE: 128 MMHG

## 2023-03-14 PROCEDURE — 2709999900 HC NON-CHARGEABLE SUPPLY: Performed by: PHYSICAL MEDICINE & REHABILITATION

## 2023-03-14 PROCEDURE — 74011250636 HC RX REV CODE- 250/636: Performed by: PHYSICAL MEDICINE & REHABILITATION

## 2023-03-14 PROCEDURE — 76030000002 HC AMB SURG OR TIME FIRST 0.: Performed by: PHYSICAL MEDICINE & REHABILITATION

## 2023-03-14 PROCEDURE — 76000 FLUOROSCOPY <1 HR PHYS/QHP: CPT

## 2023-03-14 PROCEDURE — 77030003665 HC NDL SPN BBMI -A: Performed by: PHYSICAL MEDICINE & REHABILITATION

## 2023-03-14 PROCEDURE — 74011000250 HC RX REV CODE- 250: Performed by: PHYSICAL MEDICINE & REHABILITATION

## 2023-03-14 PROCEDURE — 76210000046 HC AMBSU PH II REC FIRST 0.5 HR: Performed by: PHYSICAL MEDICINE & REHABILITATION

## 2023-03-14 PROCEDURE — 74011000636 HC RX REV CODE- 636: Performed by: PHYSICAL MEDICINE & REHABILITATION

## 2023-03-14 PROCEDURE — 72020 X-RAY EXAM OF SPINE 1 VIEW: CPT

## 2023-03-14 RX ORDER — DEXAMETHASONE SODIUM PHOSPHATE 4 MG/ML
6 INJECTION, SOLUTION INTRA-ARTICULAR; INTRALESIONAL; INTRAMUSCULAR; INTRAVENOUS; SOFT TISSUE ONCE
Status: COMPLETED | OUTPATIENT
Start: 2023-03-14 | End: 2023-03-14

## 2023-03-14 RX ORDER — BUPIVACAINE HYDROCHLORIDE 5 MG/ML
10 INJECTION, SOLUTION EPIDURAL; INTRACAUDAL ONCE
Status: DISCONTINUED | OUTPATIENT
Start: 2023-03-14 | End: 2023-03-14 | Stop reason: HOSPADM

## 2023-03-14 RX ORDER — LIDOCAINE HYDROCHLORIDE 20 MG/ML
10 INJECTION, SOLUTION INFILTRATION; PERINEURAL ONCE
Status: COMPLETED | OUTPATIENT
Start: 2023-03-14 | End: 2023-03-14

## 2023-03-14 NOTE — PERIOP NOTES
Harley Currie  1945  [id number]    Situation:  Verbal report given from: Stacey Fischer RN   Procedure: Procedure(s):  LEFT L4 AND L5 TRANSFORAMINAL EPIDURAL STEROID INJECTION    Background:    Preoperative diagnosis: LUMBAR DISC DISEASE W/ RADICULOPATHY    Postoperative diagnosis: LUMBAR DISC DISEASE W/ RADICULOPATHY    :  Dr. Karyle Racer    Assistant(s): Circ-1: Sandy Bunn RN  Circ-2: Ivelisse Barajas RN  Local Nurse Monitor: Hernán Willoughby RN  Surg Asst-1: Stacy Norris    Specimens: * No specimens in log *    Assessment:  Intra-procedure medications         Anesthesia gave intra-procedure sedation and medications, see anesthesia flow sheet     Intravenous fluids: LR@ KVO     Vital signs stable

## 2023-03-14 NOTE — OP NOTES
Operative Note    Patient: Olivia Jimenez  YOB: 1945  MRN: [id number]    Date of Procedure: 3/14/2023     Epidural Steroid Injection Operative Report    Indications: This is a 78 y.o. female who presents with low back pain and radiculopathy. She was positive for LS DDD with radiculopathy. The patient was admitted for spinal intervention as conservative measures have failed. Date of Surgery: 3/14/2023    Preoperative Diagnosis: LS DDD with Radiculopathy    Postoperative Diagnosis: LS DDD with Radiculopathy    Surgeon(s) and Role:     * Saeed Bee MD - Primary     Procedure:  Procedure(s):  LEFT L4 AND L5 TRANSFORAMINAL EPIDURAL STEROID INJECTION    Procedure in Detail:  After appropriate informed consent was obtained, the patient was taken to the operating suite and placed in the prone position on the operating table on appropriate padding. The LS region was prepped and draped in the usual sterile fashion. Intraoperative fluoroscopy was used to localize the LS spine. The skin was infiltrated with 2% lidocaine. A 25-g needle was advanced into the Left L4 and L5 neuroforamen under fluoroscopic guidance. A small amount of contrast was injected into the epidural space, confirming appropriate needle placement on fluoroscopy. Permanent fluoroscopic images were saved in the patient's record. Next, 2 ml of 2% lidocaine and 10 mg of Dexamethasone were injected. The needle was removed from the patient. The patient was then turned back into the supine position on the stretcher and was taken to the Recovery Room in stable condition.     Estimated Blood Loss:  none     Specimens: None       Drains: None          Complications:  None    Signed By: Christa Rivera MD                        March 14, 2023        Electronically Signed by Christa Rivera MD on 3/14/2023 at 3:28 PM

## 2023-03-14 NOTE — H&P
Procedural Case Note    3/14/2023    (2:30 PM)    Jasbir Jang    1945   (78 y.o.)    [id number]    CC:  pain    ROS:   Complete ROS obtained, no CP, no SOB, no N or V    PMH:     Past Medical History:   Diagnosis Date    Adverse effect of anesthesia     combative with anesthesia    Arthritis     Asthma     1/3/23 pt denies    Cancer ([facility]     tumor on nose, removed, pt not sure what type    Dry eye     Fibromyalgia     [facility]     Hypertension     Sinus bradycardia     cardiologist in [place]    Stage 3 chronic kidney disease ([facility]     sees Peña       ALLERGIES:     Allergies   Allergen Reactions    Ambien [Zolpidem] Other (comments)     Hallucinations     Amlodipine Swelling     Edema     Verapamil Cough and Other (comments)       MEDS:     No current facility-administered medications for this encounter. Visit Vitals  BP (!) 161/93 (BP 1 Location: Right arm, BP Patient Position: At rest)   Pulse 77   Temp 97.6 °F (36.4 °C)   Resp 16   Ht 5' (1.524 m)   Wt 112 kg (247 lb)   SpO2 98%   BMI 48.24 kg/m²     PE:  Gen: NAD  Head: normocephalic  Heart: RRR  Lungs: CTA raymond  Abd: NT, ND, soft  Neuro: awake and alert  Skin: intact    IMPRESSION:   Lumbar radiculopathy    Note:  The clinical status was discussed in detail with the patient. The procedure was again discussed and described in detail. All understand and accept the planned procedure and risks; reject other forms of treatment. All questions are answered.     Michelle Young MD

## 2023-03-14 NOTE — DISCHARGE INSTRUCTIONS
Dr. Kaleigh Sands Discharge Instructions  Transforaminal Epidural Steroid Injection/ Selective Nerve Block    You had a transforaminal epidural steroid injection/ selective nerve block today. You will probably have some numbness, and possibly weakness, in your leg for the next 6 to 8 hours. The steroids will slowly become effective, reducing your pain, over the next 2 weeks. You should begin feeling better after a few days, but it may take up to 2 weeks to notice the difference. The benefit you get from your injection will last a variable amount of time, depending on the severity of your lumbar spine problem. Pain: Most people do not have any increase in pain after this injection. However, you might experience some soreness in your low back. If this happens, putting an ice pack over the sore area will help. Bandage: You will have a small bandage covering the site of the injection. You may remove it once you get home. Restrictions: Someone should drive you home after the injection. After that, you have no restrictions. You need to be careful while walking, as you may still have some numbness or weakness in your leg. You may resume your normal level of activity. You may take a shower or bath, and you may eat normally. You should continue your current exercises and/or therapy routine. Medications: Continue your current medications as prescribed. If your pain decreases, you may reduce the amount of your pain medicines. If you stopped taking anticoagulants or blood-thinners before the injection, start them tomorrow. If you have diabetes, your blood sugar may be elevated for a few days. Call your primary doctor with any questions.   Call Dr. Kaleigh Sands at [phone] if you experience:  Fever (101 degrees Fahrenheit or greater)  Nausea or vomiting  Headache unrelieved by your normal pain medicine  Redness or swelling at the injection site that lasts more than 1 day  New numbness, tingling, weakness, or pain that you didnt have before the injection    Follow-up appointment:   If still having pain in 1-2 weeks, call office at [phone] for a follow up appointment. DISCHARGE SUMMARY from Nurse    The following personal items collected during your admission are returned to you:   Dental Appliance: Dental Appliances: None  Vision: Visual Aid: Glasses, With patient  Hearing Aid:    Jewelry: Jewelry: None  Clothing: Clothing: With patient  Other Valuables: Other Valuables: None  Valuables sent to safe: If you were given prescriptions, please review the written information on prescribed medications. You will receive a Post Operative Call from one of the Recovery Room Nurses on the day after your surgery to check on you. It is very important for us to know how you are recovering after your surgery. You may receive an e-mail or letter in the mail from [employer] regarding your experience with us in the Ambulatory Surgery Unit. Your feedback is valuable to us and we appreciate your participation in the survey. If you have not had your influenza or pneumococcal vaccines, please follow up with your primary care physician. The discharge information has been reviewed with the patient. The patient verbalized understanding.

## 2023-03-14 NOTE — PERIOP NOTES
Patient received to PACU, VSS. Patient awake and alert with no complaints of pain. Injection site intact. Neuro:  Push/Pull assessment:     LLE Response: strong push/pull   RLE Response: strong push/pull    Discharge instructions given. Patient verbalized understanding of instructions and follow up. Patient states ready for discharge - patient discharged at this time by wheelchair with all belongings. Family to provide transportation home.

## 2023-04-29 RX ORDER — SENNOSIDES 8.6 MG
650 CAPSULE ORAL EVERY 4 HOURS PRN
COMMUNITY

## 2023-04-29 RX ORDER — PREGABALIN 25 MG/1
25 CAPSULE ORAL DAILY
COMMUNITY

## 2023-04-29 RX ORDER — LOSARTAN POTASSIUM 25 MG/1
25 TABLET ORAL DAILY
COMMUNITY

## 2023-04-29 RX ORDER — FUROSEMIDE 40 MG/1
40 TABLET ORAL DAILY
COMMUNITY

## 2023-04-29 RX ORDER — ALBUTEROL SULFATE 90 UG/1
AEROSOL, METERED RESPIRATORY (INHALATION)
COMMUNITY

## 2023-04-29 RX ORDER — METOPROLOL SUCCINATE 25 MG/1
25 TABLET, EXTENDED RELEASE ORAL EVERY OTHER DAY
COMMUNITY

## 2023-04-29 RX ORDER — TRAMADOL HYDROCHLORIDE 50 MG/1
TABLET ORAL
COMMUNITY
Start: 2020-10-19

## 2023-06-22 RX ORDER — MAGNESIUM 200 MG
200 TABLET ORAL DAILY
COMMUNITY

## 2023-06-25 NOTE — PROGRESS NOTES
ED 7/28/2021: Hyperkalemia/Hyponatremia/Stage 3a chronic kidney disease     Educated patient about risk for severe COVID-19 due to risk factors according to CDC guidelines. ACM reviewed discharge instructions, medical action plan and red flag symptoms with the patient who verbalized understanding. Discussed COVID vaccination status: yes. Education provided on COVID-19 vaccination as appropriate. Discussed exposure protocols and quarantine with CDC Guidelines. Patient was given an opportunity to verbalize any questions and concerns and agrees to contact ACM or health care provider for questions related to their healthcare. Discussed Na+ food intake but advised to request PCP for dietician referral and to monitor BP daily.   EW Allergy;

## 2023-06-27 ENCOUNTER — HOSPITAL ENCOUNTER (OUTPATIENT)
Facility: HOSPITAL | Age: 78
Discharge: HOME | End: 2023-06-30

## 2023-06-27 ENCOUNTER — HOSPITAL ENCOUNTER (OUTPATIENT)
Facility: HOSPITAL | Age: 78
Discharge: HOME | End: 2023-06-27
Attending: PHYSICAL MEDICINE & REHABILITATION | Admitting: PHYSICAL MEDICINE & REHABILITATION
Payer: MEDICARE

## 2023-06-27 VITALS
RESPIRATION RATE: 20 BRPM | WEIGHT: 241 LBS | DIASTOLIC BLOOD PRESSURE: 75 MMHG | HEART RATE: 83 BPM | OXYGEN SATURATION: 100 % | TEMPERATURE: 97.8 F | HEIGHT: 60 IN | SYSTOLIC BLOOD PRESSURE: 156 MMHG | BODY MASS INDEX: 47.32 KG/M2

## 2023-06-27 PROCEDURE — 3600000012 HC SURGERY LEVEL 2 ADDTL 15MIN: Performed by: PHYSICAL MEDICINE & REHABILITATION

## 2023-06-27 PROCEDURE — 6360000004 HC RX CONTRAST MEDICATION: Performed by: PHYSICAL MEDICINE & REHABILITATION

## 2023-06-27 PROCEDURE — 7100000010 HC PHASE II RECOVERY - FIRST 15 MIN: Performed by: PHYSICAL MEDICINE & REHABILITATION

## 2023-06-27 PROCEDURE — 3600000002 HC SURGERY LEVEL 2 BASE: Performed by: PHYSICAL MEDICINE & REHABILITATION

## 2023-06-27 PROCEDURE — 2500000003 HC RX 250 WO HCPCS: Performed by: PHYSICAL MEDICINE & REHABILITATION

## 2023-06-27 PROCEDURE — 7100000011 HC PHASE II RECOVERY - ADDTL 15 MIN: Performed by: PHYSICAL MEDICINE & REHABILITATION

## 2023-06-27 PROCEDURE — 2709999900 HC NON-CHARGEABLE SUPPLY: Performed by: PHYSICAL MEDICINE & REHABILITATION

## 2023-06-27 RX ORDER — BUPIVACAINE HYDROCHLORIDE 5 MG/ML
10 INJECTION, SOLUTION EPIDURAL; INTRACAUDAL ONCE
Status: DISCONTINUED | OUTPATIENT
Start: 2023-06-27 | End: 2023-06-27 | Stop reason: HOSPADM

## 2023-06-27 RX ORDER — DEXAMETHASONE SODIUM PHOSPHATE 4 MG/ML
10 INJECTION, SOLUTION INTRA-ARTICULAR; INTRALESIONAL; INTRAMUSCULAR; INTRAVENOUS; SOFT TISSUE ONCE
Status: DISCONTINUED | OUTPATIENT
Start: 2023-06-27 | End: 2023-06-27 | Stop reason: HOSPADM

## 2023-06-27 RX ORDER — LIDOCAINE HYDROCHLORIDE 20 MG/ML
10 INJECTION, SOLUTION EPIDURAL; INFILTRATION; INTRACAUDAL; PERINEURAL ONCE
Status: COMPLETED | OUTPATIENT
Start: 2023-06-27 | End: 2023-06-27

## 2023-06-27 ASSESSMENT — PAIN - FUNCTIONAL ASSESSMENT: PAIN_FUNCTIONAL_ASSESSMENT: 0-10

## 2023-06-27 ASSESSMENT — PAIN DESCRIPTION - DESCRIPTORS: DESCRIPTORS: ACHING

## 2023-09-21 NOTE — DISCHARGE INSTRUCTIONS
Dr. Syeda Prieto Discharge Instructions  Transforaminal Epidural Steroid Injection/ Selective Nerve Block    You had a transforaminal epidural steroid injection/ selective nerve block today. You will probably have some numbness, and possibly weakness, in your leg for the next 6 to 8 hours. The steroids will slowly become effective, reducing your pain, over the next 2 weeks. You should begin feeling better after a few days, but it may take up to 2 weeks to notice the difference. The benefit you get from your injection will last a variable amount of time, depending on the severity of your lumbar spine problem.  Pain: Most people do not have any increase in pain after this injection. However, you might experience some soreness in your low back. If this happens, putting an ice pack over the sore area will help.  Bandage: You will have a small bandage covering the site of the injection. You may remove it once you get home.  Restrictions: Someone should drive you home after the injection. After that, you have no restrictions. You need to be careful while walking, as you may still have some numbness or weakness in your leg. You may resume your normal level of activity. You may take a shower or bath, and you may eat normally. You should continue your current exercises and/or therapy routine.   Medications: Continue your current medications as prescribed. If your pain decreases, you may reduce the amount of your pain medicines. If you stopped taking anticoagulants or blood-thinners before the injection, start them tomorrow. If you have diabetes, your blood sugar may be elevated for a few days. Call your primary doctor with any questions.   Call Dr. Syeda Prieto at [phone] if you experience:   Fever (101 degrees Fahrenheit or greater)   Nausea or vomiting   Headache unrelieved by your normal pain medicine   Redness or swelling at the injection site that lasts more than 1 day   New numbness, tingling, weakness, or pain that you didnt have before the injection    Follow-up appointment:   If still having pain in 1-2 weeks, call office at [phone] for a follow up appointment. DISCHARGE SUMMARY from Nurse    The following personal items collected during your admission are returned to you:   Dental Appliance: Dental Appliances: None  Vision:    Hearing Aid:    Jewelry: Jewelry: Necklace, With patient  Clothing: Clothing: With patient, Shirt, Undergarments, Pants, At bedside, Footwear, Socks  Other Valuables: Other Valuables: Purse(given to family)  Valuables sent to safe: If you were given prescriptions, please review the written information on prescribed medications. · You will receive a Post Operative Call from one of the Recovery Room Nurses on the day after your surgery to check on you. It is very important for us to know how you are recovering after your surgery. · You may receive an e-mail or letter in the mail from [employer] regarding your experience with us in the Ambulatory Surgery Unit. Your feedback is valuable to us and we appreciate your participation in the survey. If you have not had your influenza or pneumococcal vaccines, please follow up with your primary care physician. The discharge information has been reviewed with the patient. The patient verbalized understanding. DISCHARGE SUMMARY from Nurse    The following personal items collected during your admission are returned to you:   Dental Appliance: Dental Appliances: None  Vision:    Hearing Aid:    Jewelry: Jewelry: Necklace, With patient  Clothing: Clothing: With patient, Shirt, Undergarments, Pants, At bedside, Footwear, Socks  Other Valuables: Other Valuables: Purse(given to family)  Valuables sent to safe:        PATIENT INSTRUCTIONS:    After General Anesthesia or Intravenous Sedation, for 24 hours or while taking prescription Narcotics:        Someone should be with you for the next 24 hours.         For your own safety, a responsible adult must drive you home. · Limit your activities  · Recommended activity: Rest today, up with assistance today. Do not climb stairs or shower unattended for the next 24 hours. · Please start with a soft bland diet and advance as tolerated (no nausea) to regular diet. · If you have a sore throat you should try the following: fluids, warm salt water gargles, or throat lozenges. If it does not improve after several days please follow up with your primary physician. · Do not drive and operate hazardous machinery  · Do not make important personal or business decisions  · Do  not drink alcoholic beverages  · If you have not urinated within 8 hours after discharge, please contact your surgeon on call. Report the following to your surgeon:  · Excessive pain, swelling, redness or odor of or around the surgical area  · Temperature over 100.5  · Nausea and vomiting lasting longer than 4 hours or if unable to take medications    · You will receive a Post Operative Call from one of the Recovery Room Nurses on the day after your surgery to check on you. It is very important for us to know how you are recovering after your surgery. If you have an issue or need to speak with someone, please call your surgeon, do not wait for the post operative call. · You may receive an e-mail or letter in the mail from [employer] regarding your experience with us in the Ambulatory Surgery Unit. Your feedback is valuable to us and we appreciate your participation in the survey. · If the above instructions are not adequate, please contact HATTIE Bhardwaj, RN Perianesthesia Manager at [phone]. If you are having problems after your surgery, call the physician at his office number. · We wish you a speedy recovery ? What to do at Home:      *  Please give a list of your current medications to your Primary Care Provider.     *  Please update this list whenever your medications are discontinued, doses are      changed, or new medications (including over-the-counter products) are added. *  Please carry medication information at all times in case of emergency situations. If you have not received your influenza and/or pneumococcal vaccine, please follow up with your primary care physician. The discharge information has been reviewed with the patient and caregiver. The patient and caregiver verbalized understanding. Less than or equal to 5 Contractions in 30 minutes

## 2024-02-06 NOTE — TELEPHONE ENCOUNTER
----- Message from Jami Lynn DO sent at 2/6/2024  2:02 PM EST -----  Please let patient know stress test and echocardiogram looked okay.  Will clear for surgery, thank you        ----- Message -----  From: Paula Albarado MD  Sent: 1/31/2024   2:45 PM EST  To: Jami Lynn DO       Requested Prescriptions     Pending Prescriptions Disp Refills    traMADol (ULTRAM) 50 mg tablet 30 Tab 0     Sig: Take 1 Tab by mouth every six (6) hours as needed for Pain. Max Daily Amount: 200 mg.

## 2024-03-06 RX ORDER — ALLOPURINOL 100 MG/1
100 TABLET ORAL DAILY
COMMUNITY

## 2024-03-06 RX ORDER — PREDNISOLONE 5 MG/1
5 TABLET ORAL DAILY
COMMUNITY

## 2024-03-12 ENCOUNTER — HOSPITAL ENCOUNTER (OUTPATIENT)
Facility: HOSPITAL | Age: 79
Setting detail: OUTPATIENT SURGERY
Discharge: HOME | End: 2024-03-12
Attending: PHYSICAL MEDICINE & REHABILITATION | Admitting: PHYSICAL MEDICINE & REHABILITATION
Payer: MEDICARE

## 2024-03-12 ENCOUNTER — APPOINTMENT (OUTPATIENT)
Facility: HOSPITAL | Age: 79
End: 2024-03-12
Attending: PHYSICAL MEDICINE & REHABILITATION
Payer: MEDICARE

## 2024-03-12 VITALS
TEMPERATURE: 97.4 F | OXYGEN SATURATION: 96 % | WEIGHT: 227 LBS | SYSTOLIC BLOOD PRESSURE: 178 MMHG | DIASTOLIC BLOOD PRESSURE: 99 MMHG | BODY MASS INDEX: 44.57 KG/M2 | RESPIRATION RATE: 20 BRPM | HEIGHT: 60 IN | HEART RATE: 94 BPM

## 2024-03-12 PROCEDURE — 2500000003 HC RX 250 WO HCPCS: Performed by: PHYSICAL MEDICINE & REHABILITATION

## 2024-03-12 PROCEDURE — 7100000010 HC PHASE II RECOVERY - FIRST 15 MIN: Performed by: PHYSICAL MEDICINE & REHABILITATION

## 2024-03-12 PROCEDURE — 3600000012 HC SURGERY LEVEL 2 ADDTL 15MIN: Performed by: PHYSICAL MEDICINE & REHABILITATION

## 2024-03-12 PROCEDURE — 6360000004 HC RX CONTRAST MEDICATION: Performed by: PHYSICAL MEDICINE & REHABILITATION

## 2024-03-12 PROCEDURE — 2709999900 HC NON-CHARGEABLE SUPPLY: Performed by: PHYSICAL MEDICINE & REHABILITATION

## 2024-03-12 PROCEDURE — 6360000002 HC RX W HCPCS: Performed by: PHYSICAL MEDICINE & REHABILITATION

## 2024-03-12 PROCEDURE — 3600000002 HC SURGERY LEVEL 2 BASE: Performed by: PHYSICAL MEDICINE & REHABILITATION

## 2024-03-12 RX ORDER — LIDOCAINE HYDROCHLORIDE 20 MG/ML
10 INJECTION, SOLUTION EPIDURAL; INFILTRATION; INTRACAUDAL; PERINEURAL ONCE
Status: COMPLETED | OUTPATIENT
Start: 2024-03-12 | End: 2024-03-12

## 2024-03-12 RX ORDER — BUPIVACAINE HYDROCHLORIDE 5 MG/ML
10 INJECTION, SOLUTION EPIDURAL; INTRACAUDAL ONCE
Status: DISCONTINUED | OUTPATIENT
Start: 2024-03-12 | End: 2024-03-12 | Stop reason: HOSPADM

## 2024-03-12 RX ORDER — DEXAMETHASONE SODIUM PHOSPHATE 10 MG/ML
20 INJECTION, SOLUTION INTRAMUSCULAR; INTRAVENOUS ONCE
Status: COMPLETED | OUTPATIENT
Start: 2024-03-12 | End: 2024-03-12

## 2024-03-12 ASSESSMENT — PAIN DESCRIPTION - LOCATION: LOCATION: BACK

## 2024-03-12 ASSESSMENT — PAIN DESCRIPTION - DESCRIPTORS
DESCRIPTORS: SORE;ACHING
DESCRIPTORS: ACHING
DESCRIPTORS: ACHING

## 2024-03-12 ASSESSMENT — PAIN - FUNCTIONAL ASSESSMENT
PAIN_FUNCTIONAL_ASSESSMENT: 0-10
PAIN_FUNCTIONAL_ASSESSMENT: 0-10

## 2024-03-12 ASSESSMENT — PAIN DESCRIPTION - ORIENTATION: ORIENTATION: LEFT;LOWER

## 2024-03-12 ASSESSMENT — PAIN SCALES - GENERAL: PAINLEVEL_OUTOF10: 3

## 2024-03-12 NOTE — PERIOP NOTE
Carline Zaldivar  1945  [id number]    Situation:  Verbal report given from: JYOTI Aguilera  Procedure: Procedure(s):  LEFT L4 AND L5 TRANSFORAMINAL EPIDURAL STERIOD INJECTION    Background:    Preoperative diagnosis: Lumbar disc disease with radiculopathy [M51.16]    Postoperative diagnosis: * No post-op diagnosis entered *    :  Dr. Onofre    Assessment:  Intra-procedure medications       Vital signs stable      Recommendation:    Discharged to home after instructions reviewed with patient. Recommend rest until local anesthetic has worn off.    
Moose Garza at Dr. Onofre's office re: patient is taking Prednisone 5 mg daily.    1552- Call from Kinjal (covering for Dr. Onofre) , as per same, she will inform Dr. Onofre re: Patient on Prednisone medication.     1606 Call from Kinjal, patient is okay taking Prednisone per Dr. Onofre.  
Patient received to PACU, VSS. Patient awake and alert with no complaints of pain. Injection site intact.     Neuro:  Push/Pull assessment:      LLE Response: equal      RLE Response: equal    Discharge instructions given. Patient verbalized understanding of instructions and follow up.     Patient states ready for discharge - patient discharged at this time by wheelchair with all belongings. Son, Sai, to provide transportation home.    
Patient requested not to change arrival time of 12:15 because of her ride which she already scheduled.    2:52 lvm to Kayla at Dr. Onofre's office re: Patient is on Prednisione 5 mg daily, to inform Dr. Onofre.  
Do not bring any jewelry or money (other than copays or fees as instructed). Do not wear make-up, particularly mascara, the morning of your procedure. Wear your hair loose or down, no ponytails, buns, luisa pins or clips. All body piercings must be removed.      8. You should understand that if you do not follow these instructions your procedure may be cancelled. If your physical condition changes (i.e. fever, cold or flu) please contact your surgeon as soon as possible.    9. It is important that you be on time. If a situation occurs where you may be late, or if you have any questions or problems, please call [phone].    10. Your procedure time may be subject to change. You will receive a phone call the day prior to confirm your arrival time.    I understand a pre-operative phone call will be made to verify my procedure time. In the event that I am not available, I give permission for a message to be left on my answering service and/or with another person?      Yes      Reviewed instructions with patient, able to verbalized understanding.         ___________________      ___________________      ___________________  (Signature of Patient)          (Witness)                   (Date and Time)

## 2024-03-12 NOTE — DISCHARGE INSTRUCTIONS
Dr. Onofre Discharge Instructions  Transforaminal Epidural Steroid Injection/ Selective Nerve Block    You had a transforaminal epidural steroid injection/ selective nerve block today. You will probably have some numbness, and possibly weakness, in your leg for the next 6 to 8 hours. The steroids will slowly become effective, reducing your pain, over the next 2 weeks. You should begin feeling better after a few days, but it may take up to 2 weeks to notice the difference. The benefit you get from your injection will last a variable amount of time, depending on the severity of your lumbar spine problem.  Pain: Most people do not have any increase in pain after this injection. However, you might experience some soreness in your low back. If this happens, putting an ice pack over the sore area will help.  Bandage:  You will have a small bandage covering the site of the injection. You may remove it once you get home.  Restrictions: Someone should drive you home after the injection.  After that, you have no restrictions. You need to be careful while walking, as you may still have some numbness or weakness in your leg. You may resume your normal level of activity. You may take a shower or bath, and you may eat normally. You should continue your current exercises and/or therapy routine.   Medications: Continue your current medications as prescribed. If your pain decreases, you may reduce the amount of your pain medicines.  If you stopped taking anticoagulants or blood-thinners before the injection, start them tomorrow. If you have diabetes, your blood sugar may be elevated for a few days. Call your primary doctor with any questions.  Call Dr. Onofre at [phone] if you experience:  Fever (101 degrees Fahrenheit or greater)  Nausea or vomiting  Headache unrelieved by your normal pain medicine  Redness or swelling at the injection site that lasts more than 1 day  New numbness, tingling, weakness, or pain that you

## 2024-03-12 NOTE — H&P
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        There were no vitals filed for this visit.  PE:  Gen: NAD  Head: normocephalic  Heart: RRR  Lungs: CTA sandra  Abd: NT, ND, soft  Neuro: awake and alert  Skin: intact    IMPRESSION:   Lumbar radiculopathy    Note:  The clinical status was discussed in detail with the patient.  The procedure was again discussed and described in detail.  All understand and accept the planned procedure and risks; reject other forms of treatment.  All questions are answered.    Jd Onofre MD

## 2024-03-12 NOTE — PROGRESS NOTES
Permission received to review discharge instructions and discuss private health information with sonSai.     /strengths, push, pull equal in all extremities.

## 2024-03-12 NOTE — OP NOTE
Operative Note      Patient: Carline Zaldivar  YOB: 1945  MRN: [id number]    Date of Procedure: 3/12/2024    Pre-Op Diagnosis Codes:     * Lumbar disc disease with radiculopathy [M51.16]    Post-Op Diagnosis: Same       Procedure(s):  LEFT L4 AND L5 TRANSFORAMINAL EPIDURAL STERIOD INJECTION    Surgeon(s):  Jd Onofre MD    Assistant:   Surgical Assistant: Natasha Arroyo    Epidural Steroid Injection Operative Report    Indications: This is a 78 y.o. female who presents with low back pain and radiculopathy. She was positive for LS DDD with radiculopathy.  The patient was admitted for spinal intervention as conservative measures have failed.      Preoperative Diagnosis: LS DDD with Radiculopathy    Postoperative Diagnosis: LS DDD with Radiculopathy    Surgeon(s) and Role:     * Jd Onofre MD - Primary     Procedure:  Procedure(s):  LEFT L4 AND L5 TRANSFORAMINAL EPIDURAL STERIOD INJECTION    Procedure in Detail:  After appropriate informed consent was obtained, the patient was taken to the operating suite and placed in the prone position on the operating table on appropriate padding.  The LS region was prepped and draped in the usual sterile fashion. Intraoperative fluoroscopy was used to localize the LS spine. The skin was infiltrated with 2% lidocaine. A 25-g needle was advanced into the Left L4 and L5 neuroforamen under fluoroscopic guidance. A small amount of contrast was injected into the epidural space, confirming appropriate needle placement on fluoroscopy. Permanent fluoroscopic images were saved in the patient's record.    Next, 2 ml of 2% lidocaine and 20 mg of Dexamethasone were injected. The needle was removed from the patient. The patient was then turned back into the supine position on the stretcher and was taken to the Recovery Room in stable condition.    Estimated Blood Loss:  none     Specimens: None       Drains: None          Complications:  None    Signed

## 2024-08-29 RX ORDER — LANOLIN ALCOHOL/MO/W.PET/CERES
3 CREAM (GRAM) TOPICAL DAILY
COMMUNITY

## 2024-09-03 RX ORDER — ALBUTEROL SULFATE 90 UG/1
2 AEROSOL, METERED RESPIRATORY (INHALATION) EVERY 4 HOURS PRN
COMMUNITY

## 2024-09-03 RX ORDER — PANTOPRAZOLE SODIUM 40 MG/1
40 TABLET, DELAYED RELEASE ORAL 2 TIMES DAILY
COMMUNITY

## 2024-09-03 RX ORDER — OXYCODONE HYDROCHLORIDE 5 MG/1
5 CAPSULE ORAL 3 TIMES DAILY
COMMUNITY

## 2024-09-03 RX ORDER — FEBUXOSTAT 40 MG/1
80 TABLET, FILM COATED ORAL DAILY
COMMUNITY

## 2024-09-03 RX ORDER — MIDODRINE HYDROCHLORIDE 2.5 MG/1
2.5 TABLET ORAL 3 TIMES DAILY PRN
COMMUNITY

## 2024-09-10 ENCOUNTER — HOSPITAL ENCOUNTER (OUTPATIENT)
Facility: HOSPITAL | Age: 79
Setting detail: OUTPATIENT SURGERY
Discharge: HOME | End: 2024-09-10
Attending: PHYSICAL MEDICINE & REHABILITATION | Admitting: PHYSICAL MEDICINE & REHABILITATION
Payer: MEDICARE

## 2024-09-10 ENCOUNTER — HOSPITAL ENCOUNTER (OUTPATIENT)
Facility: HOSPITAL | Age: 79
Discharge: HOME | End: 2024-09-13

## 2024-09-10 VITALS
SYSTOLIC BLOOD PRESSURE: 139 MMHG | HEART RATE: 70 BPM | BODY MASS INDEX: 38.28 KG/M2 | RESPIRATION RATE: 16 BRPM | TEMPERATURE: 99.3 F | OXYGEN SATURATION: 100 % | WEIGHT: 195 LBS | HEIGHT: 60 IN | DIASTOLIC BLOOD PRESSURE: 66 MMHG

## 2024-09-10 PROCEDURE — 3600000012 HC SURGERY LEVEL 2 ADDTL 15MIN: Performed by: PHYSICAL MEDICINE & REHABILITATION

## 2024-09-10 PROCEDURE — 2500000003 HC RX 250 WO HCPCS: Performed by: PHYSICAL MEDICINE & REHABILITATION

## 2024-09-10 PROCEDURE — 3600000002 HC SURGERY LEVEL 2 BASE: Performed by: PHYSICAL MEDICINE & REHABILITATION

## 2024-09-10 PROCEDURE — 6360000002 HC RX W HCPCS: Performed by: PHYSICAL MEDICINE & REHABILITATION

## 2024-09-10 PROCEDURE — 7100000010 HC PHASE II RECOVERY - FIRST 15 MIN: Performed by: PHYSICAL MEDICINE & REHABILITATION

## 2024-09-10 PROCEDURE — 2709999900 HC NON-CHARGEABLE SUPPLY: Performed by: PHYSICAL MEDICINE & REHABILITATION

## 2024-09-10 PROCEDURE — 6360000004 HC RX CONTRAST MEDICATION: Performed by: PHYSICAL MEDICINE & REHABILITATION

## 2024-09-10 RX ORDER — DEXAMETHASONE SODIUM PHOSPHATE 4 MG/ML
10 INJECTION, SOLUTION INTRA-ARTICULAR; INTRALESIONAL; INTRAMUSCULAR; INTRAVENOUS; SOFT TISSUE ONCE
Status: COMPLETED | OUTPATIENT
Start: 2024-09-10 | End: 2024-09-10

## 2024-09-10 RX ORDER — BUPIVACAINE HYDROCHLORIDE 5 MG/ML
10 INJECTION, SOLUTION EPIDURAL; INTRACAUDAL ONCE
Status: COMPLETED | OUTPATIENT
Start: 2024-09-10 | End: 2024-09-10

## 2024-09-10 RX ORDER — LIDOCAINE HYDROCHLORIDE 20 MG/ML
10 INJECTION, SOLUTION EPIDURAL; INFILTRATION; INTRACAUDAL; PERINEURAL ONCE
Status: COMPLETED | OUTPATIENT
Start: 2024-09-10 | End: 2024-09-10

## 2024-09-10 ASSESSMENT — PAIN - FUNCTIONAL ASSESSMENT: PAIN_FUNCTIONAL_ASSESSMENT: 0-10

## 2024-09-10 ASSESSMENT — PAIN DESCRIPTION - DESCRIPTORS: DESCRIPTORS: ACHING

## (undated) DEVICE — Device: Brand: JELCO

## (undated) DEVICE — NEEDLE HYPO 25GA L1.5IN BVL ORIENTED ECLIPSE

## (undated) DEVICE — GLOVE ORANGE PI 8   MSG9080

## (undated) DEVICE — SYR 10ML LUER LOK 1/5ML GRAD --

## (undated) DEVICE — TOWEL SURG W17XL27IN STD BLU COT NONFENESTRATED PREWASHED

## (undated) DEVICE — SYR 5ML 1/5 GRAD LL NSAF LF --

## (undated) DEVICE — PREP SKN CHLRAPRP APL 26ML STR --

## (undated) DEVICE — MARKER,SKIN,WI/RULER AND LABELS: Brand: MEDLINE

## (undated) DEVICE — POLYLINED TOWEL: Brand: CONVERTORS

## (undated) DEVICE — HYPODERMIC SAFETY NEEDLE: Brand: MONOJECT

## (undated) DEVICE — SET EXTN PRIMING 0.59ML 8.5IN 1.55LB PRSS RATE MINIBOR PUR

## (undated) DEVICE — SYRINGE MED 10ML LUERLOCK TIP W/O SFTY DISP

## (undated) DEVICE — STERILE POLYISOPRENE POWDER-FREE SURGICAL GLOVES: Brand: PROTEXIS

## (undated) DEVICE — NEEDLE SPNL L4.75IN OD25GA QNCKE TYP SPINOCAN

## (undated) DEVICE — ADULT SPO2 SENSOR: Brand: NELLCOR

## (undated) DEVICE — NEEDLE HYPO 18GA L1.5IN PNK S STL HUB POLYPR SHLD REG BVL

## (undated) DEVICE — PAD GROUNDING ADULT UNCORDED  5-PACK

## (undated) DEVICE — APPLICATOR MEDICATED 26 CC SOLUTION HI LT ORNG CHLORAPREP

## (undated) DEVICE — SYRINGE MED 5ML STD CLR PLAS LUERLOCK TIP N CTRL DISP

## (undated) DEVICE — (D)PREP SKN CHLRAPRP APPL 26ML -- CONVERT TO ITEM 371833

## (undated) DEVICE — CVD CANNULA

## (undated) DEVICE — TOWEL OR BL STR 4/PK -- MEDICHOICE - MEDLINE

## (undated) DEVICE — TOWEL,OR,DSP,ST,BLUE,STD,4/PK,20PK/CS: Brand: MEDLINE

## (undated) DEVICE — STRAP,POSITIONING,KNEE/BODY,FOAM,4X60": Brand: MEDLINE